# Patient Record
Sex: MALE | Race: BLACK OR AFRICAN AMERICAN | NOT HISPANIC OR LATINO | Employment: FULL TIME | URBAN - METROPOLITAN AREA
[De-identification: names, ages, dates, MRNs, and addresses within clinical notes are randomized per-mention and may not be internally consistent; named-entity substitution may affect disease eponyms.]

---

## 2017-01-12 ENCOUNTER — LAB VISIT (OUTPATIENT)
Dept: LAB | Facility: HOSPITAL | Age: 27
End: 2017-01-12
Attending: INTERNAL MEDICINE
Payer: MEDICAID

## 2017-01-12 DIAGNOSIS — Z87.74 S/P TOF (TETRALOGY OF FALLOT) REPAIR: Chronic | ICD-10-CM

## 2017-01-12 DIAGNOSIS — I50.20 NYHA CLASS 2 HEART FAILURE WITH REDUCED EJECTION FRACTION: ICD-10-CM

## 2017-01-12 LAB
ALBUMIN SERPL BCP-MCNC: 4.4 G/DL
ALBUMIN SERPL BCP-MCNC: 4.4 G/DL
ALP SERPL-CCNC: 80 U/L
ALP SERPL-CCNC: 80 U/L
ALT SERPL W/O P-5'-P-CCNC: 11 U/L
ALT SERPL W/O P-5'-P-CCNC: 11 U/L
ANION GAP SERPL CALC-SCNC: 10 MMOL/L
ANION GAP SERPL CALC-SCNC: 10 MMOL/L
AST SERPL-CCNC: 19 U/L
AST SERPL-CCNC: 19 U/L
BASOPHILS # BLD AUTO: 0.04 K/UL
BASOPHILS NFR BLD: 0.5 %
BILIRUB SERPL-MCNC: 0.7 MG/DL
BILIRUB SERPL-MCNC: 0.7 MG/DL
BNP SERPL-MCNC: 52 PG/ML
BNP SERPL-MCNC: 52 PG/ML
BUN SERPL-MCNC: 13 MG/DL
BUN SERPL-MCNC: 13 MG/DL
CALCIUM SERPL-MCNC: 10.1 MG/DL
CALCIUM SERPL-MCNC: 10.1 MG/DL
CHLORIDE SERPL-SCNC: 106 MMOL/L
CHLORIDE SERPL-SCNC: 106 MMOL/L
CO2 SERPL-SCNC: 26 MMOL/L
CO2 SERPL-SCNC: 26 MMOL/L
CREAT SERPL-MCNC: 1.2 MG/DL
CREAT SERPL-MCNC: 1.2 MG/DL
DIFFERENTIAL METHOD: ABNORMAL
EOSINOPHIL # BLD AUTO: 0.6 K/UL
EOSINOPHIL NFR BLD: 8.3 %
ERYTHROCYTE [DISTWIDTH] IN BLOOD BY AUTOMATED COUNT: 14.2 %
EST. GFR  (AFRICAN AMERICAN): >60 ML/MIN/1.73 M^2
EST. GFR  (AFRICAN AMERICAN): >60 ML/MIN/1.73 M^2
EST. GFR  (NON AFRICAN AMERICAN): >60 ML/MIN/1.73 M^2
EST. GFR  (NON AFRICAN AMERICAN): >60 ML/MIN/1.73 M^2
GLUCOSE SERPL-MCNC: 71 MG/DL
GLUCOSE SERPL-MCNC: 71 MG/DL
HCT VFR BLD AUTO: 47.7 %
HGB BLD-MCNC: 15.9 G/DL
LYMPHOCYTES # BLD AUTO: 1.6 K/UL
LYMPHOCYTES NFR BLD: 20.8 %
MCH RBC QN AUTO: 28.9 PG
MCHC RBC AUTO-ENTMCNC: 33.3 %
MCV RBC AUTO: 87 FL
MONOCYTES # BLD AUTO: 0.5 K/UL
MONOCYTES NFR BLD: 6.1 %
NEUTROPHILS # BLD AUTO: 5 K/UL
NEUTROPHILS NFR BLD: 64.2 %
PLATELET # BLD AUTO: 179 K/UL
PMV BLD AUTO: 11.3 FL
POTASSIUM SERPL-SCNC: 4 MMOL/L
POTASSIUM SERPL-SCNC: 4 MMOL/L
PROT SERPL-MCNC: 8 G/DL
PROT SERPL-MCNC: 8 G/DL
RBC # BLD AUTO: 5.5 M/UL
SODIUM SERPL-SCNC: 142 MMOL/L
SODIUM SERPL-SCNC: 142 MMOL/L
TSH SERPL DL<=0.005 MIU/L-ACNC: 0.9 UIU/ML
URATE SERPL-MCNC: 5.9 MG/DL
WBC # BLD AUTO: 7.75 K/UL

## 2017-01-12 PROCEDURE — 84550 ASSAY OF BLOOD/URIC ACID: CPT

## 2017-01-12 PROCEDURE — 85025 COMPLETE CBC W/AUTO DIFF WBC: CPT

## 2017-01-12 PROCEDURE — 83880 ASSAY OF NATRIURETIC PEPTIDE: CPT

## 2017-01-12 PROCEDURE — 84443 ASSAY THYROID STIM HORMONE: CPT

## 2017-01-12 PROCEDURE — 36415 COLL VENOUS BLD VENIPUNCTURE: CPT

## 2017-01-12 PROCEDURE — 80053 COMPREHEN METABOLIC PANEL: CPT

## 2017-06-22 ENCOUNTER — HOSPITAL ENCOUNTER (OUTPATIENT)
Dept: CARDIOLOGY | Facility: CLINIC | Age: 27
Discharge: HOME | End: 2017-06-22
Payer: MEDICAID

## 2017-06-22 ENCOUNTER — LAB VISIT (OUTPATIENT)
Dept: LAB | Facility: HOSPITAL | Age: 27
End: 2017-06-22
Payer: MEDICAID

## 2017-06-22 ENCOUNTER — OFFICE VISIT (OUTPATIENT)
Dept: CARDIOLOGY | Facility: CLINIC | Age: 27
End: 2017-06-22
Payer: MEDICAID

## 2017-06-22 VITALS
HEART RATE: 60 BPM | DIASTOLIC BLOOD PRESSURE: 125 MMHG | BODY MASS INDEX: 27.92 KG/M2 | OXYGEN SATURATION: 100 % | SYSTOLIC BLOOD PRESSURE: 194 MMHG | HEIGHT: 66 IN | WEIGHT: 173.75 LBS

## 2017-06-22 DIAGNOSIS — Z95.4 S/P TRANSCATHETER REPLACEMENT OF PULMONARY VALVE: ICD-10-CM

## 2017-06-22 DIAGNOSIS — I42.0 DILATED CARDIOMYOPATHY: ICD-10-CM

## 2017-06-22 DIAGNOSIS — Z98.890 S/P RIGHT VENTRICLE TO PULMONARY ARTERY (RV-PA) CONDUIT: ICD-10-CM

## 2017-06-22 DIAGNOSIS — Z87.74 S/P TOF (TETRALOGY OF FALLOT) REPAIR: Chronic | ICD-10-CM

## 2017-06-22 DIAGNOSIS — I35.1 NONRHEUMATIC AORTIC VALVE INSUFFICIENCY: ICD-10-CM

## 2017-06-22 DIAGNOSIS — I51.7 CARDIOMEGALY: ICD-10-CM

## 2017-06-22 DIAGNOSIS — Q21.3 TOF (TETRALOGY OF FALLOT): Primary | ICD-10-CM

## 2017-06-22 DIAGNOSIS — Q21.3 TOF (TETRALOGY OF FALLOT): ICD-10-CM

## 2017-06-22 DIAGNOSIS — I10 ESSENTIAL HYPERTENSION: ICD-10-CM

## 2017-06-22 LAB
ALBUMIN SERPL BCP-MCNC: 4.1 G/DL
ALP SERPL-CCNC: 109 U/L
ALT SERPL W/O P-5'-P-CCNC: 12 U/L
ANION GAP SERPL CALC-SCNC: 7 MMOL/L
AST SERPL-CCNC: 18 U/L
BASOPHILS # BLD AUTO: 0.04 K/UL
BASOPHILS NFR BLD: 0.4 %
BILIRUB SERPL-MCNC: 0.5 MG/DL
BNP SERPL-MCNC: 46 PG/ML
BUN SERPL-MCNC: 9 MG/DL
CALCIUM SERPL-MCNC: 10 MG/DL
CHLORIDE SERPL-SCNC: 105 MMOL/L
CO2 SERPL-SCNC: 29 MMOL/L
CREAT SERPL-MCNC: 1.1 MG/DL
DIFFERENTIAL METHOD: ABNORMAL
EOSINOPHIL # BLD AUTO: 0.8 K/UL
EOSINOPHIL NFR BLD: 8.8 %
ERYTHROCYTE [DISTWIDTH] IN BLOOD BY AUTOMATED COUNT: 14.9 %
EST. GFR  (AFRICAN AMERICAN): >60 ML/MIN/1.73 M^2
EST. GFR  (NON AFRICAN AMERICAN): >60 ML/MIN/1.73 M^2
GLUCOSE SERPL-MCNC: 72 MG/DL
HCT VFR BLD AUTO: 46 %
HGB BLD-MCNC: 15.2 G/DL
LYMPHOCYTES # BLD AUTO: 1.4 K/UL
LYMPHOCYTES NFR BLD: 15.3 %
MCH RBC QN AUTO: 29 PG
MCHC RBC AUTO-ENTMCNC: 33 %
MCV RBC AUTO: 88 FL
MONOCYTES # BLD AUTO: 0.6 K/UL
MONOCYTES NFR BLD: 6.6 %
NEUTROPHILS # BLD AUTO: 6.2 K/UL
NEUTROPHILS NFR BLD: 68.6 %
PLATELET # BLD AUTO: 198 K/UL
PMV BLD AUTO: 11.1 FL
POTASSIUM SERPL-SCNC: 4.4 MMOL/L
PROT SERPL-MCNC: 7.7 G/DL
RBC # BLD AUTO: 5.25 M/UL
SODIUM SERPL-SCNC: 141 MMOL/L
WBC # BLD AUTO: 9.09 K/UL

## 2017-06-22 PROCEDURE — 83880 ASSAY OF NATRIURETIC PEPTIDE: CPT | Mod: TXP

## 2017-06-22 PROCEDURE — 93010 ELECTROCARDIOGRAM REPORT: CPT | Mod: NTX,,, | Performed by: PEDIATRICS

## 2017-06-22 PROCEDURE — 36415 COLL VENOUS BLD VENIPUNCTURE: CPT | Mod: TXP

## 2017-06-22 PROCEDURE — 80053 COMPREHEN METABOLIC PANEL: CPT | Mod: TXP

## 2017-06-22 PROCEDURE — 93325 DOPPLER ECHO COLOR FLOW MAPG: CPT | Mod: PBBFAC,NTX | Performed by: INTERNAL MEDICINE

## 2017-06-22 PROCEDURE — 99215 OFFICE O/P EST HI 40 MIN: CPT | Mod: S$PBB,NTX,, | Performed by: PEDIATRICS

## 2017-06-22 PROCEDURE — 93005 ELECTROCARDIOGRAM TRACING: CPT | Mod: PBBFAC,NTX | Performed by: PEDIATRICS

## 2017-06-22 PROCEDURE — 99999 PR PBB SHADOW E&M-EST. PATIENT-LVL III: CPT | Mod: PBBFAC,TXP,, | Performed by: PEDIATRICS

## 2017-06-22 PROCEDURE — 93320 DOPPLER ECHO COMPLETE: CPT | Mod: 26,S$PBB,NTX, | Performed by: INTERNAL MEDICINE

## 2017-06-22 PROCEDURE — 85025 COMPLETE CBC W/AUTO DIFF WBC: CPT | Mod: TXP

## 2017-06-22 PROCEDURE — 93303 ECHO TRANSTHORACIC: CPT | Mod: 26,S$PBB,NTX, | Performed by: INTERNAL MEDICINE

## 2017-06-22 RX ORDER — AMOXICILLIN 500 MG/1
500 CAPSULE ORAL 3 TIMES DAILY
COMMUNITY
Start: 2017-06-14 | End: 2017-06-23

## 2017-06-22 NOTE — PROGRESS NOTES
"2017    Re:Thomas Horvath  :1990     Sissy Lacy MD  [address] [place] [address] [facility] Note    Thomas Horvath is a 26 y.o. male with the following diagnoses:  1. 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   3. Status post placement of epicardial left ventricular pacing wires.   4. Medical noncompliance, lack of insurance and marijuana abuse  5. 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 Mild pulmonary artery hypertension.   7. Trace aortic insufficiency and mild aortic root enlargement  8. Decreased LV function - possibly exacerbated by high afterload.  Unchanged compared to last echo.   9. Hypertension.   10. likely depression  11. inconsistent compliance       In 2015 he was admitted with worsening dyspnea on exertion (NYHA heart III), chest pain, severe fatigue.  An echocardiogram revealed severe obstruction in his right ventricle to pulmonary artery conduit.  Left ventricular function was also noted.  A new pulmonary valve was placed in the Cath Lab as noted above.  That procedure went extremely well.  He continued to have significant systemic hypertension and headaches after the procedure that required several days to get under control.    Interval history:  I last saw him nearly 2 years ago.  He is "trying to get my life together".  He started back on some of his medications a few weeks ago.  I had to call his pharmacy to confirm that he is now taking the following:  Carvedilol 12.5mg bid  lisinopril 10mg daily  aldactone 12.5mg daily    He has lots of complaints:  1. Continued unchanged dyspnea on exertion.  He can walk long distances without any shortness of breath, but very brief jogging causes extreme shortness of breath.  2. Frequent chest " "pain.  It is predominantly left sided and is worsened with stress.    3. Anxiety and depression.  No suicidal/homicidal thoughts, but lots of worry.  Self medicates with marijuana ("2 blunts/day") and occasional xanax.  Anxiety has gotten in the way of his care.  He admits that some noncompliance with follow up is due to worry.  4. Concerns about future job opportunities.  5. Occasional constipation.  6. Lots of abdominal pain, lower back pain, flank pain.    The review of systems is as noted above. It is otherwise negative for other symptoms related to the general, neurological, psychiatric, endocrine, gastrointestinal, genitourinary, respiratory, dermatologic, musculoskeletal, hematologic, and immunologic systems.    Past Medical History:   Diagnosis Date    Aortic insufficiency     trace    Homograft cardiac valve stenosis     mild    Hypertension     Left ventricular dysfunction     Pulmonary artery hypertension     RV hypertension    Right ventricular dilation     S/P TOF (tetralogy of Fallot) repair 12/18/2014    TOF (tetralogy of Fallot)      Past Surgical History:   Procedure Laterality Date    balloon dilation of pulmonary atrery homograft  2008    CARDIAC VALVE SURGERY      7/23/15 [facility]    epicardial pacing wires      LV    pulmonary valve replacement  July 7, 2006    TETRALOGY OF FALLOT REPAIR  1990     Family History   Problem Relation Age of Onset    Hypertension Mother     No Known Problems Father     No Known Problems Sister     No Known Problems Brother     No Known Problems Maternal Grandmother     No Known Problems Maternal Grandfather     No Known Problems Paternal Grandmother     No Known Problems Paternal Grandfather     No Known Problems Maternal Aunt     No Known Problems Maternal Uncle     No Known Problems Paternal Aunt     No Known Problems Paternal Uncle     Anemia Neg Hx     Arrhythmia Neg Hx     Asthma Neg Hx     Clotting disorder Neg Hx     Fainting Neg Hx " "    Heart attack Neg Hx     Heart disease Neg Hx     Heart failure Neg Hx     Hyperlipidemia Neg Hx     Stroke Neg Hx     Atrial Septal Defect Neg Hx      Social History     Social History    Marital status: Single     Spouse name: N/A    Number of children: N/A    Years of education: 10     Social History Main Topics    Smoking status: Former Smoker     Start date: 6/9/2006     Quit date: 6/9/2015    Smokeless tobacco: Never Used      Comment: marijuana    Alcohol use No    Drug use:      Frequency: 7.0 times per week     Types: Marijuana    Sexual activity: Yes     Partners: Female     Other Topics Concern    None     Social History Narrative    Lives at home with mother at this time.  He is uninsured and not working.     Current Outpatient Prescriptions on File Prior to Visit   Medication Sig Dispense Refill    aspirin 81 MG Chew Take 1 tablet (81 mg total) by mouth once daily.  0    carvedilol (COREG) 25 MG tablet Take 1 tablet (25 mg total) by mouth 2 (two) times daily. 180 tablet 3    lisinopril (PRINIVIL,ZESTRIL) 20 MG tablet Take 1 tablet (20 mg total) by mouth every evening. 90 tablet 5    alprazolam (XANAX) 1 MG tablet TK 1 T PO  TID PRN FOR ANXIETY  0    digoxin (LANOXIN) 125 mcg tablet Take 1 tablet (125 mcg total) by mouth once daily. 90 tablet 3    furosemide (LASIX) 20 MG tablet Take 1 tablet (20 mg total) by mouth daily as needed (weight gain > 3lbs in 24hrs). 1 up to 4 a day as needed only for swelling, wt gain 90 tablet 11    hydrOXYzine pamoate (VISTARIL) 25 MG Cap Take 1 capsule (25 mg total) by mouth 4 (four) times daily. 15 capsule 0    spironolactone (ALDACTONE) 25 MG tablet Take 1 tablet (25 mg total) by mouth once daily. 90 tablet 4     No current facility-administered medications on file prior to visit.      No Known Allergies    BP (!) 194/125 (BP Location: Left arm, Patient Position: Sitting, BP Method: Automatic)   Pulse 60   Ht 5' 6" (1.676 m)   Wt 78.8 kg (173 " lb 11.6 oz)   SpO2 100%   BMI 28.04 kg/m²    Recheck BP in right arm - 186/116.  In general, he is a healthy appearing, nondysmorphic-appearing, black male in no apparent distress. The eyes, nares, and oropharynx are clear.  The neck is supple without thyroid enlargement.  There is very mild jugular venous distention.  The lungs are clear to auscultation bilaterally. There is no wheezing.  There are no rales.  A well-healed median sternotomy incision is noted.  The first heart sound is normal.  A grade 1/6 systolic ejection murmur is best auscultated at the upper left sternal border.  There are no diastolic murmurs or gallops.  The second heart sound is fixed and split.  The abdominal exam is benign without hepatosplenomegaly, tenderness, or distention.  Pulses are normal in all extremities except the right hand where it is mildly decreased.  There is no clubbing, cyanosis, or edema.  No rashes are noted.  He does have a tattoo on the right arm.    I personally reviewed the following tests performed today and my interpretation follows:  Echo: (my interpretation, full report pending)  Mild TR with peak velocity as high as 3 m/s suggesting rv pressure around 45mmHg  Mild to moderately dilated right ventricle with mild to moderate dysfunction  Images consistent with Anne implant in pulmonary homograft with trivial insufficiency and no significant stenosis  Trivial mitral valve insufficiency.  Normal left ventricle structure and size.  Moderately reduced LV function - I estimate an EF around 35-40%, but report is pending.  Trivial aortic valve insufficiency.  No pericardial effusion.    CMP  Sodium   Date Value Ref Range Status   06/22/2017 141 136 - 145 mmol/L Final     Potassium   Date Value Ref Range Status   06/22/2017 4.4 3.5 - 5.1 mmol/L Final     Chloride   Date Value Ref Range Status   06/22/2017 105 95 - 110 mmol/L Final     CO2   Date Value Ref Range Status   06/22/2017 29 23 - 29 mmol/L Final     Glucose    Date Value Ref Range Status   06/22/2017 72 70 - 110 mg/dL Final     BUN, Bld   Date Value Ref Range Status   06/22/2017 9 6 - 20 mg/dL Final     Creatinine   Date Value Ref Range Status   06/22/2017 1.1 0.5 - 1.4 mg/dL Final     Calcium   Date Value Ref Range Status   06/22/2017 10.0 8.7 - 10.5 mg/dL Final     Total Protein   Date Value Ref Range Status   06/22/2017 7.7 6.0 - 8.4 g/dL Final     Albumin   Date Value Ref Range Status   06/22/2017 4.1 3.5 - 5.2 g/dL Final     Total Bilirubin   Date Value Ref Range Status   06/22/2017 0.5 0.1 - 1.0 mg/dL Final     Comment:     For infants and newborns, interpretation of results should be based  on gestational age, weight and in agreement with clinical  observations.  Premature Infant recommended reference ranges:  Up to 24 hours.............<8.0 mg/dL  Up to 48 hours............<12.0 mg/dL  3-5 days..................<15.0 mg/dL  6-29 days.................<15.0 mg/dL       Alkaline Phosphatase   Date Value Ref Range Status   06/22/2017 109 55 - 135 U/L Final     AST   Date Value Ref Range Status   06/22/2017 18 10 - 40 U/L Final     ALT   Date Value Ref Range Status   06/22/2017 12 10 - 44 U/L Final     Anion Gap   Date Value Ref Range Status   06/22/2017 7 (L) 8 - 16 mmol/L Final     eGFR if    Date Value Ref Range Status   06/22/2017 >60.0 >60 mL/min/1.73 m^2 Final     eGFR if non    Date Value Ref Range Status   06/22/2017 >60.0 >60 mL/min/1.73 m^2 Final     Comment:     Calculation used to obtain the estimated glomerular filtration  rate (eGFR) is the CKD-EPI equation. Since race is unknown   in our information system, the eGFR values for   -American and Non--American patients are given   for each creatinine result.       Lab Results   Component Value Date    WBC 9.09 06/22/2017    HGB 15.2 06/22/2017    HCT 46.0 06/22/2017    MCV 88 06/22/2017     06/22/2017     Results for DON PALOMO (MRN [id number])  as of 6/23/2017 13:31   Ref. Range 6/22/2017 14:53   BNP Latest Ref Range: 0 - 99 pg/mL 46     Cath 7/23/15:  IMPRESSION:  1. Repaired tetralogy of Fallot with severe homograft stenosis, peak gradient 40 mmHg.  2. Right and left ventricular dysfunction and low cardiac output, normal pulmonary vascular resistance calculations.  3. Right aortic arch.  4. Right coronary artery from left sinus of Valsalva.  5. RV to PA conduit dilation with Reina 16, 18, 20 and 22 mm balloons.  6. RV to pulmonary artery conduit stent with Palmaz [id number] on 22 x 4 BIB (x3), postdilated with [place] 22 mm balloon (16 atmospheres).  7. Anne valve implantation on 22-Tristanian Ensemble, residual gradient 9 mmHg, trivial insufficiency.    Discussion:  The pulmonary valve placed percutaneously looks great.  His right ventricular outflow tract obstruction is completely resolved, and there is no significant insufficiency.  He has significant right ventricular dysfunction.  This may not recover.  My bigger concern is his left ventricle.  The function is significantly decreased.  I think this is at least in part related to his significant hypertension which is worsened by his medical noncompliance.  We have seen much better ejection fraction is when his blood pressure was better controlled, including an EF of about 45% on the day after his most recent cardiac catheterization while he was in the hospital.  I had a long talk with the patient.  I think depression is a significant issue and likely contributes to a lot of his symptoms and his noncompliance.  My plan is as follows:  1.  He has to take a baby aspirin once a day.  Generic baby aspirin from any pharmacy is adequate.  This is to help prevent thrombus formation on his new pulmonary valve.  2.  Endocarditis prophylaxis before procedures is indicated.  3.  We need to get his blood pressure under better control to help his ventricular function.  Increase back to original doses of medications - I called  these into his pharmacy - lisinopril 20mg daily, carvedilol 25mg bid.    4.  He has an appointment on 6/28 with his PCP.  I will call her:   - recheck BP.  If systolic is greater than 140, would add HCTZ daily   - evaluation of anxiety and depression, consider medications  5.  Follow up with me with repeat echo on 8/9/17.  Hopefully we will see better LV function with better BP control.  6.  He should go to the emergency room immediately with worsening shortness of breath, significant chest pain, syncope, or palpitations.  7.  Recommended he back off on the marijuana use.    Further recommendations regarding activity restrictions will be based on LV recovery.  If his LV function remains significantly decreased, he will likely need disability long term.  He may need an ICD in the future.  He has resisted a transplant evaluation.    Ino Corona MD  Pediatric Cardiology  Adult Congenital Heart Disease  Pediatric Heart Failure and Transplantation  [facility] [address] [place]  [address]  [phone]

## 2017-06-23 RX ORDER — SPIRONOLACTONE 25 MG/1
12.5 TABLET ORAL DAILY
Qty: 30 TABLET | Refills: 6 | Status: ON HOLD
Start: 2017-06-23 | End: 2019-06-28

## 2017-06-23 RX ORDER — CARVEDILOL 25 MG/1
25 TABLET ORAL 2 TIMES DAILY
Qty: 60 TABLET | Refills: 6 | Status: ON HOLD
Start: 2017-06-23 | End: 2019-06-28

## 2017-06-23 RX ORDER — LISINOPRIL 20 MG/1
20 TABLET ORAL NIGHTLY
Qty: 30 TABLET | Refills: 6 | Status: ON HOLD
Start: 2017-06-23 | End: 2019-06-28

## 2017-06-24 LAB
DIASTOLIC DYSFUNCTION: YES
ESTIMATED PA SYSTOLIC PRESSURE: 38.94
RETIRED EF AND QEF - SEE NOTES: 38 (ref 55–65)
TRICUSPID VALVE REGURGITATION: ABNORMAL

## 2018-08-07 ENCOUNTER — DOCUMENTATION ONLY (OUTPATIENT)
Dept: TRANSPLANT | Facility: CLINIC | Age: 28
End: 2018-08-07

## 2018-08-07 NOTE — PROGRESS NOTES
Pt's Preht file being closed d/t lack of f/u on pt's part. All appts scheduled with the clinic were either cancelled or no show. Last note on 11/14/2016 informed Transplant Coordinator that he will call back when he is able to return.

## 2019-02-25 DIAGNOSIS — Z87.74 S/P TOF (TETRALOGY OF FALLOT) REPAIR: Primary | Chronic | ICD-10-CM

## 2019-02-27 ENCOUNTER — TELEPHONE (OUTPATIENT)
Dept: PEDIATRIC CARDIOLOGY | Facility: CLINIC | Age: 29
End: 2019-02-27

## 2019-02-27 NOTE — TELEPHONE ENCOUNTER
Made appointment for Thomas on March 14th, start time 1:30. Will mail out appointment slip today. Thomas verbalized understanding all information provided.   ----- Message from Mariana Orellana sent at 2/27/2019  2:33 PM CST -----  Contact: pt  Pt would like to be called back regarding his appt    Pt can be reached at [phone]

## 2019-06-27 ENCOUNTER — HOSPITAL ENCOUNTER (OUTPATIENT)
Facility: HOSPITAL | Age: 29
Discharge: HOME | End: 2019-06-28
Attending: EMERGENCY MEDICINE | Admitting: INTERNAL MEDICINE
Payer: MEDICAID

## 2019-06-27 DIAGNOSIS — E83.41 HYPERMAGNESEMIA: ICD-10-CM

## 2019-06-27 DIAGNOSIS — Z87.74 S/P TOF (TETRALOGY OF FALLOT) REPAIR: Chronic | ICD-10-CM

## 2019-06-27 DIAGNOSIS — I50.42 CHRONIC COMBINED SYSTOLIC AND DIASTOLIC HEART FAILURE: ICD-10-CM

## 2019-06-27 DIAGNOSIS — E87.5 HYPERKALEMIA: ICD-10-CM

## 2019-06-27 DIAGNOSIS — N17.9 AKI (ACUTE KIDNEY INJURY): Primary | ICD-10-CM

## 2019-06-27 DIAGNOSIS — R07.9 CHEST PAIN: ICD-10-CM

## 2019-06-27 DIAGNOSIS — E83.52 HYPERCALCEMIA: ICD-10-CM

## 2019-06-27 DIAGNOSIS — T67.5XXA HEAT EXHAUSTION, INITIAL ENCOUNTER: ICD-10-CM

## 2019-06-27 DIAGNOSIS — Q21.3 TOF (TETRALOGY OF FALLOT): ICD-10-CM

## 2019-06-27 DIAGNOSIS — E86.9 VOLUME DEPLETION: ICD-10-CM

## 2019-06-27 LAB
ALBUMIN SERPL BCP-MCNC: 5.4 G/DL (ref 3.5–5.2)
ALP SERPL-CCNC: 101 U/L (ref 55–135)
ALT SERPL W/O P-5'-P-CCNC: 27 U/L (ref 10–44)
ANION GAP SERPL CALC-SCNC: 12 MMOL/L (ref 8–16)
ANION GAP SERPL CALC-SCNC: 14 MMOL/L (ref 8–16)
ANION GAP SERPL CALC-SCNC: 16 MMOL/L (ref 8–16)
AST SERPL-CCNC: 38 U/L (ref 10–40)
BASOPHILS # BLD AUTO: 0.02 K/UL (ref 0–0.2)
BASOPHILS NFR BLD: 0.2 % (ref 0–1.9)
BILIRUB SERPL-MCNC: 0.9 MG/DL (ref 0.1–1)
BNP SERPL-MCNC: 21 PG/ML (ref 0–99)
BUN SERPL-MCNC: 32 MG/DL (ref 6–20)
BUN SERPL-MCNC: 34 MG/DL (ref 6–20)
BUN SERPL-MCNC: 35 MG/DL (ref 6–20)
CALCIUM SERPL-MCNC: 10.2 MG/DL (ref 8.7–10.5)
CALCIUM SERPL-MCNC: 10.2 MG/DL (ref 8.7–10.5)
CALCIUM SERPL-MCNC: 12.2 MG/DL (ref 8.7–10.5)
CHLORIDE SERPL-SCNC: 102 MMOL/L (ref 95–110)
CHLORIDE SERPL-SCNC: 104 MMOL/L (ref 95–110)
CHLORIDE SERPL-SCNC: 96 MMOL/L (ref 95–110)
CK SERPL-CCNC: 686 U/L (ref 20–200)
CO2 SERPL-SCNC: 19 MMOL/L (ref 23–29)
CO2 SERPL-SCNC: 22 MMOL/L (ref 23–29)
CO2 SERPL-SCNC: 26 MMOL/L (ref 23–29)
CREAT SERPL-MCNC: 1.4 MG/DL (ref 0.5–1.4)
CREAT SERPL-MCNC: 2 MG/DL (ref 0.5–1.4)
CREAT SERPL-MCNC: 3.2 MG/DL (ref 0.5–1.4)
DIFFERENTIAL METHOD: ABNORMAL
DIGOXIN SERPL-MCNC: 0.4 NG/ML (ref 0.8–2)
EOSINOPHIL # BLD AUTO: 0.2 K/UL (ref 0–0.5)
EOSINOPHIL NFR BLD: 1.7 % (ref 0–8)
ERYTHROCYTE [DISTWIDTH] IN BLOOD BY AUTOMATED COUNT: 13.4 % (ref 11.5–14.5)
EST. GFR  (AFRICAN AMERICAN): 29 ML/MIN/1.73 M^2
EST. GFR  (AFRICAN AMERICAN): 51 ML/MIN/1.73 M^2
EST. GFR  (AFRICAN AMERICAN): >60 ML/MIN/1.73 M^2
EST. GFR  (NON AFRICAN AMERICAN): 25 ML/MIN/1.73 M^2
EST. GFR  (NON AFRICAN AMERICAN): 44 ML/MIN/1.73 M^2
EST. GFR  (NON AFRICAN AMERICAN): >60 ML/MIN/1.73 M^2
GLUCOSE SERPL-MCNC: 68 MG/DL (ref 70–110)
GLUCOSE SERPL-MCNC: 95 MG/DL (ref 70–110)
GLUCOSE SERPL-MCNC: 99 MG/DL (ref 70–110)
HCT VFR BLD AUTO: 56.6 % (ref 40–54)
HGB BLD-MCNC: 18.5 G/DL (ref 14–18)
LACTATE SERPL-SCNC: 1.4 MMOL/L (ref 0.5–2.2)
LYMPHOCYTES # BLD AUTO: 1.2 K/UL (ref 1–4.8)
LYMPHOCYTES NFR BLD: 9.8 % (ref 18–48)
MAGNESIUM SERPL-MCNC: 2.9 MG/DL (ref 1.6–2.6)
MCH RBC QN AUTO: 28.1 PG (ref 27–31)
MCHC RBC AUTO-ENTMCNC: 32.7 G/DL (ref 32–36)
MCV RBC AUTO: 86 FL (ref 82–98)
MONOCYTES # BLD AUTO: 1.1 K/UL (ref 0.3–1)
MONOCYTES NFR BLD: 8.9 % (ref 4–15)
NEUTROPHILS # BLD AUTO: 10 K/UL (ref 1.8–7.7)
NEUTROPHILS NFR BLD: 79.4 % (ref 38–73)
PLATELET # BLD AUTO: 178 K/UL (ref 150–350)
PMV BLD AUTO: 10.6 FL (ref 9.2–12.9)
POCT GLUCOSE: 100 MG/DL (ref 70–110)
POCT GLUCOSE: 114 MG/DL (ref 70–110)
POCT GLUCOSE: 163 MG/DL (ref 70–110)
POTASSIUM SERPL-SCNC: 5 MMOL/L (ref 3.5–5.1)
POTASSIUM SERPL-SCNC: 5.1 MMOL/L (ref 3.5–5.1)
POTASSIUM SERPL-SCNC: 7.1 MMOL/L (ref 3.5–5.1)
PROT SERPL-MCNC: 10 G/DL (ref 6–8.4)
RBC # BLD AUTO: 6.59 M/UL (ref 4.6–6.2)
SODIUM SERPL-SCNC: 136 MMOL/L (ref 136–145)
SODIUM SERPL-SCNC: 136 MMOL/L (ref 136–145)
SODIUM SERPL-SCNC: 139 MMOL/L (ref 136–145)
TROPONIN I SERPL DL<=0.01 NG/ML-MCNC: <0.006 NG/ML (ref 0–0.03)
TROPONIN I SERPL DL<=0.01 NG/ML-MCNC: <0.006 NG/ML (ref 0–0.03)
WBC # BLD AUTO: 12.59 K/UL (ref 3.9–12.7)

## 2019-06-27 PROCEDURE — 25000003 PHARM REV CODE 250: Performed by: EMERGENCY MEDICINE

## 2019-06-27 PROCEDURE — 99291 CRITICAL CARE FIRST HOUR: CPT | Mod: 25

## 2019-06-27 PROCEDURE — 83605 ASSAY OF LACTIC ACID: CPT

## 2019-06-27 PROCEDURE — 94640 AIRWAY INHALATION TREATMENT: CPT

## 2019-06-27 PROCEDURE — 25000242 PHARM REV CODE 250 ALT 637 W/ HCPCS: Performed by: EMERGENCY MEDICINE

## 2019-06-27 PROCEDURE — 96374 THER/PROPH/DIAG INJ IV PUSH: CPT

## 2019-06-27 PROCEDURE — 83735 ASSAY OF MAGNESIUM: CPT

## 2019-06-27 PROCEDURE — 25000003 PHARM REV CODE 250: Performed by: STUDENT IN AN ORGANIZED HEALTH CARE EDUCATION/TRAINING PROGRAM

## 2019-06-27 PROCEDURE — 36415 COLL VENOUS BLD VENIPUNCTURE: CPT

## 2019-06-27 PROCEDURE — G0378 HOSPITAL OBSERVATION PER HR: HCPCS

## 2019-06-27 PROCEDURE — 94644 CONT INHLJ TX 1ST HOUR: CPT

## 2019-06-27 PROCEDURE — 82962 GLUCOSE BLOOD TEST: CPT

## 2019-06-27 PROCEDURE — 82550 ASSAY OF CK (CPK): CPT

## 2019-06-27 PROCEDURE — 80048 BASIC METABOLIC PNL TOTAL CA: CPT

## 2019-06-27 PROCEDURE — 84484 ASSAY OF TROPONIN QUANT: CPT | Mod: 91

## 2019-06-27 PROCEDURE — 80053 COMPREHEN METABOLIC PANEL: CPT

## 2019-06-27 PROCEDURE — 63600175 PHARM REV CODE 636 W HCPCS: Performed by: EMERGENCY MEDICINE

## 2019-06-27 PROCEDURE — 83880 ASSAY OF NATRIURETIC PEPTIDE: CPT

## 2019-06-27 PROCEDURE — 80162 ASSAY OF DIGOXIN TOTAL: CPT

## 2019-06-27 PROCEDURE — 93005 ELECTROCARDIOGRAM TRACING: CPT

## 2019-06-27 PROCEDURE — 85025 COMPLETE CBC W/AUTO DIFF WBC: CPT

## 2019-06-27 PROCEDURE — 96361 HYDRATE IV INFUSION ADD-ON: CPT

## 2019-06-27 PROCEDURE — 84484 ASSAY OF TROPONIN QUANT: CPT

## 2019-06-27 RX ORDER — NAPROXEN SODIUM 220 MG/1
81 TABLET, FILM COATED ORAL DAILY
Status: DISCONTINUED | OUTPATIENT
Start: 2019-06-28 | End: 2019-06-28 | Stop reason: HOSPADM

## 2019-06-27 RX ORDER — SODIUM CHLORIDE 9 MG/ML
1000 INJECTION, SOLUTION INTRAVENOUS
Status: COMPLETED | OUTPATIENT
Start: 2019-06-27 | End: 2019-06-27

## 2019-06-27 RX ORDER — DEXTROSE 50 % IN WATER (D50W) INTRAVENOUS SYRINGE
25
Status: COMPLETED | OUTPATIENT
Start: 2019-06-27 | End: 2019-06-27

## 2019-06-27 RX ORDER — HEPARIN SODIUM 5000 [USP'U]/ML
5000 INJECTION, SOLUTION INTRAVENOUS; SUBCUTANEOUS EVERY 8 HOURS
Status: DISCONTINUED | OUTPATIENT
Start: 2019-06-27 | End: 2019-06-28 | Stop reason: HOSPADM

## 2019-06-27 RX ORDER — ALBUTEROL SULFATE 2.5 MG/.5ML
10 SOLUTION RESPIRATORY (INHALATION)
Status: COMPLETED | OUTPATIENT
Start: 2019-06-27 | End: 2019-06-27

## 2019-06-27 RX ORDER — CARVEDILOL 25 MG/1
25 TABLET ORAL 2 TIMES DAILY
Status: DISCONTINUED | OUTPATIENT
Start: 2019-06-27 | End: 2019-06-28 | Stop reason: HOSPADM

## 2019-06-27 RX ORDER — SODIUM CHLORIDE 0.9 % (FLUSH) 0.9 %
10 SYRINGE (ML) INJECTION
Status: DISCONTINUED | OUTPATIENT
Start: 2019-06-27 | End: 2019-06-28 | Stop reason: HOSPADM

## 2019-06-27 RX ADMIN — DEXTROSE MONOHYDRATE 25 G: 500 INJECTION PARENTERAL at 12:06

## 2019-06-27 RX ADMIN — SODIUM CHLORIDE, SODIUM LACTATE, POTASSIUM CHLORIDE, AND CALCIUM CHLORIDE 1000 ML: .6; .31; .03; .02 INJECTION, SOLUTION INTRAVENOUS at 06:06

## 2019-06-27 RX ADMIN — SODIUM CHLORIDE 1000 ML: 0.9 INJECTION, SOLUTION INTRAVENOUS at 12:06

## 2019-06-27 RX ADMIN — CARVEDILOL 25 MG: 25 TABLET, FILM COATED ORAL at 09:06

## 2019-06-27 RX ADMIN — INSULIN HUMAN 10 UNITS: 100 INJECTION, SOLUTION PARENTERAL at 12:06

## 2019-06-27 RX ADMIN — ALBUTEROL SULFATE 10 MG: 2.5 SOLUTION RESPIRATORY (INHALATION) at 12:06

## 2019-06-27 RX ADMIN — SODIUM CHLORIDE 1000 ML: 0.9 INJECTION, SOLUTION INTRAVENOUS at 11:06

## 2019-06-27 NOTE — H&P
\[facility]\"" Internal Medicine History and Physical - Resident Note    Admitting Team: \[facility]\"" Internal Medicine Team A  Attending Physician: Dr. Kat  Resident: Rob  Interns: Sangeetha    Date of Admit: 6/27/2019    Chief Complaint     Chest Pain (with weakness and cramping, +n/v  pain to chest and sheree arms, Pt had valve replacement surgery.)   for 1 day.    Subjective:      History of Present Illness:  Thomas Horvath is a 28 y.o. male who  has a past medical history of Aortic insufficiency, Homograft cardiac valve stenosis, Hypertension, Left ventricular dysfunction, Pulmonary artery hypertension, Right ventricular dilation, S/P TOF (tetralogy of Fallot) repair (12/18/2014), and TOF (tetralogy of Fallot).. The patient presented to [facility] on 6/27/2019 with a primary complaint of Chest Pain (with weakness and cramping, +n/v  pain to chest and sheree arms, Pt had valve replacement surgery.)    Patient reports that he was feeling fatigued starting last night before he went to bed. Woke up in the middle of the night with some cramping to his R leg. 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 This morning was at work, had just finished delivering water/ice (heavy lifting) when he began to feel weak with muscle cramps, beginning in his legs and arms, also to his abdomen, chest and back. Associated diaphoresis and felt very dry, had emesis x 2 NBNB. He sat down with some colleagues, tried to have some gatorade and water but cramping persisted. Colleague contacted EMS.     Patient received 2L IVF in ED. Found to be hyperkalemic with K 7, given insulin/D50. Labs also very hemoconcentrated, VINOD present. On my interview, feeling better with no associated cramping. Denies chest pain, nausea, diarrhea, fever/chills, sensory changes or weakness, vision changes.     Previously followed with cardiology for genetic heart defect and surgeries; was supposed to see them  in January but started working so was unable. Last appointment per chart review 6/22/2017.  Reports compliance with home medicines: lisinopril, spironolactone, coreg, digoxin, ASA; reports he took medicines this morning.     Past Medical History:  Past Medical History:   Diagnosis Date    Aortic insufficiency     trace    Homograft cardiac valve stenosis     mild    Hypertension     Left ventricular dysfunction     Pulmonary artery hypertension     RV hypertension    Right ventricular dilation     S/P TOF (tetralogy of Fallot) repair 12/18/2014    TOF (tetralogy of Fallot)        Past Surgical History:  Past Surgical History:   Procedure Laterality Date    balloon dilation of pulmonary atrery homograft  2008    CARDIAC VALVE SURGERY      7/23/15 @ [facility] [place]    epicardial pacing wires      LV    pulmonary valve replacement  July 7, 2006    REPLACEMENT-VALVE-PULMONARY N/A 7/23/2015    Performed by J[facility]z Jr., [facility] at [facility] CATH LAB    C WITH [facility]RO [facility] CONGEITAL CARD ABN N/A 7/23/2015    Performed by J[facility] Jr., MD at [facility] CATH LAB    TETRALOGY OF FALLOT REPAIR  1990       Allergies:  Review of patient's allergies indicates:  No Known Allergies    Home Medications:  Prior to Admission medications    Medication Sig Start Date End Date Taking? Authorizing Provider   aspirin 81 MG Chew Take 1 tablet (81 mg total) by mouth once daily. 10/14/16 10/14/17  Kelvin Harmon MD   carvedilol (COREG) 25 MG tablet Take 1 tablet (25 mg total) by mouth 2 (two) times daily. 6/23/17 6/23/18  Ino Corona MD   digoxin (LANOXIN) 125 mcg tablet Take 125 mcg by mouth once daily.    Historical Provider, MD   fluticasone (FLONASE) 50 mcg/actuation nasal spray 1 spray (50 mcg total) by Each Nare route 2 (two) times daily as needed for Rhinitis. 11/4/18   Jamal Rodriguez NP   lisinopril (PRINIVIL,ZESTRIL) 20 MG tablet Take 1 tablet (20 mg total) by mouth every evening. 6/23/17 6/23/18  Ino Corona MD  "  loratadine (CLARITIN) 10 mg tablet Take 1 tablet (10 mg total) by mouth once daily. 18  Jamal Rodriguez NP   spironolactone (ALDACTONE) 25 MG tablet Take 0.5 tablets (12.5 mg total) by mouth once daily. 17  Ino Corona MD       Family History:  Family History   Problem Relation Age of Onset    Hypertension Mother     No Known Problems Father     No Known Problems Sister     No Known Problems Brother     No Known Problems Maternal Grandmother     No Known Problems Maternal Grandfather     No Known Problems Paternal Grandmother     No Known Problems Paternal Grandfather     No Known Problems Maternal Aunt     No Known Problems Maternal Uncle     No Known Problems Paternal Aunt     No Known Problems Paternal Uncle     Anemia Neg Hx     Arrhythmia Neg Hx     Asthma Neg Hx     Clotting disorder Neg Hx     Fainting Neg Hx     Heart attack Neg Hx     Heart disease Neg Hx     Heart failure Neg Hx     Hyperlipidemia Neg Hx     Stroke Neg Hx     Atrial Septal Defect Neg Hx        Social History:  Social History     Tobacco Use    Smoking status: Former Smoker     Start date: 2006     Last attempt to quit: 2015     Years since quittin.0    Smokeless tobacco: Never Used    Tobacco comment: marijuana   Substance Use Topics    Alcohol use: No     Alcohol/week: 0.0 oz    Drug use: Yes     Frequency: 7.0 times per week     Types: Marijuana       Review of Systems:  Pertinent positives and negatives listed in HPI. All other systems are reviewed and are negative.    Health Maintaince :   Primary Care Physician: Dr. Lacy  Immunizations:   TDap unknown.  Influenza is up to date.  Pneumovax is up to date.  Cancer Screening:  None indicated for age.      Objective:   Last 24 Hour Vital Signs:  BP  Min: 86/68  Max: 135/71  Pulse  Av.6  Min: 68  Max: 76  Resp  Av  Min: 18  Max: 29  SpO2  Av.2 %  Min: 99 %  Max: 100 %  Height  Av' 6" (167.6 cm)  " "Min: 5' 6" (167.6 cm)  Max: 5' 6" (167.6 cm)  Weight  Av.5 kg (195 lb)  Min: 88.5 kg (195 lb)  Max: 88.5 kg (195 lb)  Body mass index is 31.47 kg/m².  No intake/output data recorded.    Physical Examination:  General: Alert and awake in no apparent distress, resting comfortably in bed  Head:  Normocephalic and atraumatic  Eyes:  PERRL; EOMi with anicteric sclera and clear conjunctivae  Mouth:  Oropharynx clear and without exudate; moist mucous membranes  Cardio:  Regular rate and rhythm, fixed split mechanical S2 best heard over LUSB, diastolic murmur appreciated   Resp:  CTAB; respirations unlabored; no wheezes, crackles or rhonchi  Abdom: Soft, NTND with normoactive bowel sounds  Extrem: Warm and well-perfused with no clubbing, cyanosis or edema  Skin:  No rashes, lesions, or color changes  Pulses: 2+ and symmetric distally  Neuro:  AAOx3; cooperative and pleasant with no focal deficits    Laboratory:  Most Recent Data:  CBC:   Lab Results   Component Value Date    WBC 12.59 2019    HGB 18.5 (H) 2019    HCT 56.6 (H) 2019     2019    MCV 86 2019    RDW 13.4 2019     BMP:   Lab Results   Component Value Date     2019    K 7.1 (HH) 2019    CL 96 2019    CO2 26 2019    BUN 35 (H) 2019    CREATININE 3.2 (H) 2019    GLU 99 2019    CALCIUM 12.2 (HH) 2019    MG 2.9 (H) 2019    PHOS 3.7 2015     Estimated Creatinine Clearance: 35.5 mL/min (A) (based on SCr of 3.2 mg/dL (H)).  LFTs:   Lab Results   Component Value Date    PROT 10.0 (H) 2019    ALBUMIN 5.4 (H) 2019    BILITOT 0.9 2019    AST 38 2019    ALKPHOS 101 2019    ALT 27 2019     Coags:   Lab Results   Component Value Date    INR 1.2 2015     FLP: No results found for: CHOL, HDL, LDLCALC, TRIG, CHOLHDL  DM:   Lab Results   Component Value Date    CREATININE 3.2 (H) 2019     Thyroid:   Lab Results   Component " Value Date    TSH 0.903 01/12/2017     Anemia: No results found for: IRON, TIBC, FERRITIN, UOWVILHP83, FOLATE  Cardiac:   Lab Results   Component Value Date    TROPONINI <0.006 06/27/2019    BNP 46 06/22/2017     Urinalysis:   Lab Results   Component Value Date    LABURIN NO GROWTH AFTER 48 HOURS. 06/09/2006    COLORU Nancy 04/28/2015    SPECGRAV 1.025 04/28/2015    NITRITE Negative 04/28/2015    KETONESU 3+ (A) 04/28/2015    UROBILINOGEN 4.0-6.0 (A) 04/28/2015       Trended Cardiac Data:  Recent Labs   Lab 06/27/19  1121   TROPONINI <0.006       Microbiology Data:  N/A    Other Laboratory Data:  N/A    Other Results:  EKG (my interpretation): sinus rhythm, tachycardia, bifasicular block, some ST changes does not appear to be dynamic ischemic changes    Radiology:  Imaging Results          X-Ray Chest AP Portable (Final result)  Result time 06/27/19 11:44:52    Final result by Noreen Clifton MD (06/27/19 11:44:52)                 Impression:      Mild increased pulmonary vascularity, new from the prior study      Electronically signed by: Noreen Clifton MD  Date:    06/27/2019  Time:    11:44             Narrative:    EXAMINATION:  XR CHEST AP PORTABLE    CLINICAL HISTORY:  Chest Pain;    TECHNIQUE:  Single frontal view of the chest was performed.    COMPARISON:  07/24/2015    FINDINGS:  There are sternotomy wire.  There is an abandoned lead  left upper thoracic region.  There is a small vascular stent in the upper thoracic region, unchanged.  Mild elevation of the left hemidiaphragm is unchanged from the prior study.  There is mild increased pulmonary vascularity which is new from the prior study.  No focal airspace disease.  No large pleural effusion.  No pneumothorax.                                   Assessment:     Thomas Horvath is a 28 y.o. male with:  Patient Active Problem List    Diagnosis Date Noted    VINOD (acute kidney injury) 06/27/2019    NYHA class 2 heart failure with reduced ejection  fraction 10/14/2016    Noncompliance 10/14/2016    HFrEF (heart failure with reduced ejection fraction) 09/09/2015    Aortic insufficiency 09/09/2015    Right aortic arch 09/09/2015    S/P right ventricle to pulmonary artery (RV-PA) conduit 09/09/2015    Conduit failure in patient with reconstruction for congenital heart disease 09/09/2015    S/P transcatheter replacement of pulmonary valve 09/09/2015    LVH (left ventricular hypertrophy) 09/09/2015    Drug use 07/23/2015    TOF (tetralogy of Fallot) 07/20/2015    Shingles rash 07/03/2015    Dilated cardiomyopathy 06/09/2015    Pulmonic stenosis 06/09/2015    Essential hypertension 04/28/2015    Pulmonary artery hypertension 02/10/2015    Right ventricular dilation 02/10/2015    S/P TOF (tetralogy of Fallot) repair 12/18/2014        Plan:     Weakness 2/2 heat exhaustion  - patient reporting working outside in heat, manual labor, muscle cramps, fatigue and weakness starting last night and progressing to this morning with emesis x 2  - labs c/w hemoconcentrated, initially hypotensive  - good response BP and Sx with IVF in ED  - continue gentle fluid resuscitation, improving per repeat labs, careful with cardiac history    VINOD with hyperkalemia likely 2/2 pre-renal   - Cr on admit 3.2, K 7.1  - no known renal dysfunction at baseline  - likely 2/2 volume depletion as above  - shifted, IVF, ordered digoxin level, hold spironolcatone  - improving after hydration, continue to monitor     Chronic Biventricular Combined Heart Failure  - Patient with congenital and extensive cardiac history and multiple surgeries  - last TTE 6/22/2017 with EF 38%, mild to moderate TR, SARAH with RVH, mildly depressed RV function, LVEF 35-40%, grade III diastolic dysfunction   - does not appear clinically in acute exacerbation on exam, in fact fluid down  - gentle IVF for VINOD as above  - cardiology consulted, appreciate recs: do not rec ACS workup at this time or repeat TTE,  will follow     Hx of TOF with repair, R sided aortic arch, multiple cardiac surgeries  - previously followed with cardiology, has not seen since 6/2017 2/2 missed follow up with work schedule  - cardiology consulted here, appreciate recs as above  - follow up with his cardiologist on discharge      Diet: Cardiac  Ppx: SQH  Dispo: obs, pending resolution of gross lab abnormalities and muscle cramping, on tele     Code Status:     Full    Isis Rivas  [facility] Internal Medicine HO-II  [facility] Internal Medicine Service    [facility] Medicine Hospitalist Pager numbers:   [facility] Hospitalist Medicine Team A (Dedra/Shey): [phone]  [facility] Hospitalist Medicine Team B (Terrie/Ana Maria):  [phone]

## 2019-06-27 NOTE — NURSING
Pt arrived to the unit via stretcher with pt escort. Pt AAOx3, respirations even and unlabored. NAD. Pt oriented to room and unit. Bed in lowest position and locked, siderails upx2, call bell in reach. Telemetry monitor placed. Full assessment to  be documented per EPIC flowsheet.

## 2019-06-27 NOTE — ED PROVIDER NOTES
Encounter Date: 6/27/2019    SCRIBE #1 NOTE: I, Ran Shelley, am scribing for, and in the presence of,  Dr. Lefort. I have scribed the entire note.       History     Chief Complaint   Patient presents with    Chest Pain     with weakness and cramping, +n/v  pain to chest and sheree arms, Pt had valve replacement surgery.     Thomas Horvath is a 28 y.o. male who  has a past medical history of Aortic insufficiency, Homograft cardiac valve stenosis, Hypertension, Left ventricular dysfunction, Pulmonary artery hypertension, Right ventricular dilation, S/P TOF (tetralogy of Fallot) repair (12/18/2014), and TOF (tetralogy of Fallot).    The patient presents to the ED due to chest pain. The patient describes the pain as cramping and states it radiates across his chest to both of his arms. He reports neck pain, back pain, and nausea with two episodes of vomiting. He denies hematemesis, shortness of breath, or abdominal pain. Per EMS, the patient was given Nitro and Aspirin when in route to this facility. The patient notes he had open heart surgery for a valve replacement. The patient reports no other symptoms at this time.    The history is provided by the patient and the EMS personnel.     Review of patient's allergies indicates:  No Known Allergies  Past Medical History:   Diagnosis Date    Aortic insufficiency     trace    Homograft cardiac valve stenosis     mild    Hypertension     Left ventricular dysfunction     Pulmonary artery hypertension     RV hypertension    Right ventricular dilation     S/P TOF (tetralogy of Fallot) repair 12/18/2014    TOF (tetralogy of Fallot)      Past Surgical History:   Procedure Laterality Date    balloon dilation of pulmonary atrery homograft  2008    CARDIAC VALVE SURGERY      7/23/15 @ [facility] [place]    epicardial pacing wires      LV    pulmonary valve replacement  July 7, 2006    REPLACEMENT-VALVE-PULMONARY N/A 7/23/2015    [facility] CATH LAB     [facility] WITH RETRO [facility] CONGEITAL CARD ABN N/A 2015    [facility] at [facility] CATH LAB    TETRALOGY OF FALLOT REPAIR       Family History   Problem Relation Age of Onset    Hypertension Mother     No Known Problems Father     No Known Problems Sister     No Known Problems Brother     No Known Problems Maternal Grandmother     No Known Problems Maternal Grandfather     No Known Problems Paternal Grandmother     No Known Problems Paternal Grandfather     No Known Problems Maternal Aunt     No Known Problems Maternal Uncle     No Known Problems Paternal Aunt     No Known Problems Paternal Uncle     Anemia Neg Hx     Arrhythmia Neg Hx     Asthma Neg Hx     Clotting disorder Neg Hx     Fainting Neg Hx     Heart attack Neg Hx     Heart disease Neg Hx     Heart failure Neg Hx     Hyperlipidemia Neg Hx     Stroke Neg Hx     Atrial Septal Defect Neg Hx      Social History     Tobacco Use    Smoking status: Former Smoker     Start date: 2006     Last attempt to quit: 2015     Years since quittin.0    Smokeless tobacco: Never Used    Tobacco comment: marijuana   Substance Use Topics    Alcohol use: No     Alcohol/week: 0.0 oz    Drug use: Yes     Frequency: 7.0 times per week     Types: Marijuana     Review of Systems   Constitutional: Negative for chills and fever.   HENT: Negative for congestion, ear pain, rhinorrhea and sore throat.    Respiratory: Negative for cough, shortness of breath and wheezing.    Cardiovascular: Positive for chest pain. Negative for palpitations.   Gastrointestinal: Positive for nausea and vomiting. Negative for abdominal pain and diarrhea.   Genitourinary: Negative for dysuria and hematuria.   Musculoskeletal: Positive for back pain and neck pain. Negative for myalgias.   Skin: Negative for rash.   Neurological: Negative for dizziness, weakness, light-headedness and headaches.   Psychiatric/Behavioral: Negative for confusion.        Physical Exam     Initial Vitals [06/27/19 1049]   BP Pulse Resp Temp SpO2   (!) 86/68 76 18 -- 99 %      MAP       --         Physical Exam    Nursing note and vitals reviewed.  Constitutional: He appears well-developed and well-nourished. He is diaphoretic.   HENT:   Head: Normocephalic and atraumatic.   Dry mucous membranes.   Eyes: Conjunctivae and EOM are normal. Pupils are equal, round, and reactive to light.   Neck: Normal range of motion. Neck supple.   Cardiovascular: Normal rate and regular rhythm. Exam reveals gallop and S3.    No murmur heard.  Pulmonary/Chest: Breath sounds normal. He has no wheezes. He has no rhonchi. He has no rales.   Sternotomy scar     Abdominal: Soft. There is no tenderness. There is no rebound and no guarding.   Musculoskeletal: Normal range of motion. He exhibits no edema or tenderness.   Soft muscular compartments.    Neurological: He is alert and oriented to person, place, and time. He has normal strength.   Skin: Capillary refill takes less than 2 seconds.   Skin is cool.          ED Course   Critical Care  Date/Time: 6/27/2019 12:31 PM  Performed by: Guy J. Lefort, MD  Authorized by: Guy J. Lefort, MD   Direct patient critical care time: 5 minutes  Additional history critical care time: 5 minutes  Ordering / reviewing critical care time: 5 minutes  Documentation critical care time: 5 minutes  Consulting other physicians critical care time: 5 minutes  Consult with family critical care time: 5 minutes  Other critical care time: 5 minutes  Total critical care time (exclusive of procedural time) : 35 minutes  Critical care was necessary to treat or prevent imminent or life-threatening deterioration of the following conditions: cardiac failure.  Critical care was time spent personally by me on the following activities: blood draw for specimens, development of treatment plan with patient or surrogate, discussions with primary provider, discussions with consultants,  evaluation of patient's response to treatment, examination of patient, ordering and performing treatments and interventions, ordering and review of laboratory studies, ordering and review of radiographic studies, re-evaluation of patient's condition and review of old charts.        Labs Reviewed   CBC W/ AUTO DIFFERENTIAL - Abnormal; Notable for the following components:       Result Value    RBC 6.59 (*)     Hemoglobin 18.5 (*)     Hematocrit 56.6 (*)     Gran # (ANC) 10.0 (*)     Mono # 1.1 (*)     Gran% 79.4 (*)     Lymph% 9.8 (*)     All other components within normal limits   POCT GLUCOSE - Abnormal; Notable for the following components:    POCT Glucose 114 (*)     All other components within normal limits   TROPONIN I   COMPREHENSIVE METABOLIC PANEL   URINALYSIS, REFLEX TO URINE CULTURE   MAGNESIUM   CK          Imaging Results          X-Ray Chest AP Portable (Final result)  Result time 06/27/19 11:44:52    Final result by Noreen Clifton MD (06/27/19 11:44:52)                 Impression:      Mild increased pulmonary vascularity, new from the prior study      Electronically signed by: Noreen Clifton MD  Date:    06/27/2019  Time:    11:44             Narrative:    EXAMINATION:  XR CHEST AP PORTABLE    CLINICAL HISTORY:  Chest Pain;    TECHNIQUE:  Single frontal view of the chest was performed.    COMPARISON:  07/24/2015    FINDINGS:  There are sternotomy wire.  There is an abandoned lead  left upper thoracic region.  There is a small vascular stent in the upper thoracic region, unchanged.  Mild elevation of the left hemidiaphragm is unchanged from the prior study.  There is mild increased pulmonary vascularity which is new from the prior study.  No focal airspace disease.  No large pleural effusion.  No pneumothorax.                                 Medical Decision Making:   Initial Assessment:   28 y.o male presents to the ED due to chest pain.       Differential Diagnosis:   Myocardial ischemia,  pericarditis, pulmonary embolus, chest wall pain, pleural inflammation and pulmonary infectious causes.    Independently Interpreted Test(s):   I have ordered and independently interpreted X-rays - see prior notes.  I have ordered and independently interpreted EKG Reading(s) - see prior notes  Clinical Tests:   Lab Tests: Ordered and Reviewed  Radiological Study: Ordered and Reviewed  Medical Tests: Ordered and Reviewed  ED Management:  Initial pre-hospital complaint of chest pain appears to be secondary to diffuse musculoskeletal pain brought on by heat exposure and severe dehydration.  Discussed with Cardiology and agree EKGs appear stable to patient's previous likely abnormal due to history of tetralogy of Fallot with surgical repair.  Fluid resuscitation given noted to have electrolyte derangements of potassium calcium and magnesium.  Treated for hyperkalemia in the department.  Significant acute kidney injury present. Place on observation for further management.                      Clinical Impression:       ICD-10-CM ICD-9-CM   1. VINOD (acute kidney injury) N17.9 584.9   2. Chest pain R07.9 786.50   3. Heat exhaustion, initial encounter T67.5XXA 992.5   4. Hyperkalemia E87.5 276.7   5. Hypercalcemia E83.52 275.42   6. Hypermagnesemia E83.41 275.2   7. Volume depletion E86.9 276.50   8. Chronic combined systolic and diastolic heart failure I50.42 428.42   9. S/P TOF (tetralogy of Fallot) repair Z87.74 V13.65   10. TOF (tetralogy of Fallot) Q21.3 745.2         Disposition:   Disposition: Discharged  Condition: Stable    Scribe Attestation I, Dr. Guy Lefort, personally performed the services described in this documentation. All medical record entries made by the scribe were at my direction and in my presence. I have reviewed the chart and agree that the record reflects my personal performance and is accurate and complete. Guy Lefort, MD.  2:19 AM 06/29/2019                      Guy J. Lefort, MD  06/29/19  1497

## 2019-06-27 NOTE — CONSULTS
U Cardiology Consult - Fellow Note    Consultant Attending: Dr. Sheets  Consultant Resident: Gordo    Reason for Consult:     Chest Pain    Subjective:      History of Present Illness:  Thomas Horvath is a 28 y.o.  male who  has a past medical history of Aortic insufficiency, Homograft cardiac valve stenosis, Hypertension, Left ventricular dysfunction, Pulmonary artery hypertension, Right ventricular dilation, S/P TOF (tetralogy of Fallot) repair (12/18/2014), and TOF (tetralogy of Fallot).. The patient presented to the [facility] on 6/27/2019 with a primary complaint of Chest Pain (with weakness and cramping, +n/v  pain to chest and sheree arms, Pt had valve replacement surgery.)    Complex cardiac history in the past with follow up with congential specialist. 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 He states that he woke up with some cramping in his left thigh and went to work. He states that he was then significant diaphoretic and this was followed by cramping that started in his stomach and then went to his chest, arms and neck. The pains are not pressure-like but a cramping. He states the pain is similar throughout his entire body. He has never had such a pain before. The patient then states that he went to occupational health where a heart rate in the 30s was noted along with diaphoresis which then came up to the 50s and EMS was called. On the way to the hospital the heart rate was noted to be in the 70s on ECG. He continues to have cramping on exam.    Past Medical History:  Past Medical History:   Diagnosis Date    Aortic insufficiency     trace    Homograft cardiac valve stenosis     mild    Hypertension     Left ventricular dysfunction     Pulmonary artery hypertension     RV hypertension    Right ventricular dilation     S/P TOF (tetralogy of Fallot) repair 12/18/2014    TOF (tetralogy of Fallot)        Past Surgical History:  Past  Surgical History:   Procedure Laterality Date    balloon dilation of pulmonary atrery homograft  2008    CARDIAC VALVE SURGERY      7/23/15 [place] [facility]    epicardial pacing wires      LV    pulmonary valve replacement  July 7, 2006    REPLACEMENT-VALVE-PULMONARY N/A 7/23/2015    Performed by Jayson Diez Jr., MD at [facility] C[facility] LAB    RHC WITH RETRO C CONGEITAL CARD ABN N/A 7/23/2015    Performed by Jayson Diez Jr., MD at [facility] CATH LAB    TETRALOGY OF FALLOT REPAIR  1990       Allergies:  Review of patient's allergies indicates:  No Known Allergies    Medications:   In-Hospital Scheduled Medications:   sodium chloride 0.9%  1,000 mL Intravenous Once      In-Hospital PRN Medications:     In-Hospital IV Infusion Medications:     Home Medications:  Prior to Admission medications    Medication Sig Start Date End Date Taking? Authorizing Provider   aspirin 81 MG Chew Take 1 tablet (81 mg total) by mouth once daily. 10/14/16 10/14/17  Kelvin Harmon MD   carvedilol (COREG) 25 MG tablet Take 1 tablet (25 mg total) by mouth 2 (two) times daily. 6/23/17 6/23/18  Ino Corona MD   digoxin (LANOXIN) 125 mcg tablet Take 125 mcg by mouth once daily.    Historical Provider, MD   fluticasone (FLONASE) 50 mcg/actuation nasal spray 1 spray (50 mcg total) by Each Nare route 2 (two) times daily as needed for Rhinitis. 11/4/18   Jamal Rodriguez NP   lisinopril (PRINIVIL,ZESTRIL) 20 MG tablet Take 1 tablet (20 mg total) by mouth every evening. 6/23/17 6/23/18  Ino Corona MD   loratadine (CLARITIN) 10 mg tablet Take 1 tablet (10 mg total) by mouth once daily. 11/4/18 11/4/19  Jamal Rodriguez NP   spironolactone (ALDACTONE) 25 MG tablet Take 0.5 tablets (12.5 mg total) by mouth once daily. 6/23/17 6/23/18  Ino Corona MD       Family History:  Family History   Problem Relation Age of Onset    Hypertension Mother     No Known Problems Father     No Known Problems Sister     No Known Problems Brother     No Known  "Problems Maternal Grandmother     No Known Problems Maternal Grandfather     No Known Problems Paternal Grandmother     No Known Problems Paternal Grandfather     No Known Problems Maternal Aunt     No Known Problems Maternal Uncle     No Known Problems Paternal Aunt     No Known Problems Paternal Uncle     Anemia Neg Hx     Arrhythmia Neg Hx     Asthma Neg Hx     Clotting disorder Neg Hx     Fainting Neg Hx     Heart attack Neg Hx     Heart disease Neg Hx     Heart failure Neg Hx     Hyperlipidemia Neg Hx     Stroke Neg Hx     Atrial Septal Defect Neg Hx        Social History:  Social History     Tobacco Use    Smoking status: Former Smoker     Start date: 2006     Last attempt to quit: 2015     Years since quittin.0    Smokeless tobacco: Never Used    Tobacco comment: marijuana   Substance Use Topics    Alcohol use: No     Alcohol/week: 0.0 oz    Drug use: Yes     Frequency: 7.0 times per week     Types: Marijuana       Review of Systems:  Pertinent items are noted in HPI. All other systems are reviewed and are negative.    Currently on File:   Most Recent Immunizations   Administered Date(s) Administered    DTP 1994    HIB 1994    Hepatitis B, Pediatric/Adolescent 1994    Influenza 12/15/2009    Influenza - Trivalent - PF (PED) 2014    Influenza A (H1N1) 2009 Monovalent - IM 12/15/2009    MMR 1994    OPV 1994    Td (ADULT) 2002        Objective:   Last 24 Hour Vital Signs:  BP  Min: 86/68  Max: 86/68  Pulse  Av  Min: 76  Max: 76  Resp  Av  Min: 18  Max: 18  SpO2  Av %  Min: 99 %  Max: 99 %  Height  Av' 6" (167.6 cm)  Min: 5' 6" (167.6 cm)  Max: 5' 6" (167.6 cm)  Weight  Av.5 kg (195 lb)  Min: 88.5 kg (195 lb)  Max: 88.5 kg (195 lb)  No intake/output data recorded.    Physical Examination:  General: Alert, in no acute distress, sitting in bed, mild diaphoresis   Head:NCAT, no abnormalities  Eyes: PERRL, " EOMI, anicteric  Neck: No obvious masses, deformity  Lungs: CTAB, no wheezing, symmetric expansion  Chest wall: no deformity  Heart: RRR,  s1/s2 normal, systolic murmur III/VI best heard at LSB  Abdomen: soft, nt/nd, no obvious masses   Extremities: No obvious trauma, no cyanosis or edema  Pulses: 1+ Pulses in extremities  Neurologic: Cranial Nerves grossly intact, no obvious deficits, AAOx3        Laboratory Results:    Trended Lab Data:  No results for input(s): WBC, HGB, HCT, PLT, MCV, RDW, NA, K, CL, CO2, BUN, LABCREA, GLU, PROT, ALBUMIN, BILITOT, AST, ALKPHOS, ALT in the last 168 hours.    Trended Cardiac Data:  No results for input(s): TROPONINI, CKTOTAL, CKMB, BNP in the last 168 hours.    Other Laboratory Data:  Unavailable at time of note    Other Results:  EKG (my interpretation): sinus with rbbb and significant QRS fractionation    Bedside echo difficult to interpret but appeared like a dilated cardiomyopathy. Difficult to visualize IVC    Radiology:  No results found.       Assessment:     Thomas Horvath is a 28 y.o. male with chest pain and cramping. Cardiology consulted for chest pain.      Recommendations:   Acute  # Rhabdomyolysis- possibly related to significant heat exposure  # Acute Kidney Injury - elevated Cr on admit likely heat exposure  # Hyperkalmia - elevated on admit  # Hypotension - systolic pressures in the 80s, asx, likely hypovolemic, Hgb is significantly elevated suggesting he is concentrated and Cr elevated with VINOD     Chronic  # Tetrology of Fallot - severe homograft stenosis, peak gradient 40 mmHg. Seen Dr. 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  # RV to pulmonary artery conduit stent with  Ambrocio [id number] on 22 x 4 BIB (x3), postdilated with Reina 22 mm balloon (16 atmospheres).  # Anne valve - implantation on 22-Maltese Ensemble, residual gradient 9 mmHg, trivial insufficiency on past assesment    Recommendations  - Treat hyperkalemia aggressively  - Avoid his home lisinopril at this time  - Would not proceed with ACS evaluation or treatment at this time  - Will need hydration for his hypotension  - Would NOT order an echocardiogram at this time as this admission does not appear related to his heart  - Monitor electrolytes closely  - Closely monitor volume status given his cardiomyopathy he may not tolerate too much volume  - Monitor I/Os  - Continue on telemetry  - Rest per primary    Shashi Caro MD  [facility] Fellow  [facility] Service

## 2019-06-27 NOTE — PROGRESS NOTES
VN cued into room to complete admit assessment. Plan of care reviewed with patient. Patient verbalized understanding. Pt informed of fall risk and fall precautions, call light within reach, 2x bed rails, pt wearing slip resistant socks. No complaints if pain at this time. Patient notified to ask staff for assistance and pt verbalized complete understanding. Will continue to monitor as needed.     06/27/19 1503   Type of Frequent Check   Type Patient Rounds   Safety/Activity   Patient Rounds bed in low position;clutter free environment maintained;visualized patient   Safety Promotion/Fall Prevention commode/urinal/bedpan at bedside;Fall Risk reviewed with patient/family;side rails raised x 2;instructed to call staff for mobility   Safety Precautions emergency equipment at bedside   Positioning   Body Position sitting up in bed   Head of Bed (HOB) HOB at 30-45 degrees   Pain/Comfort/Sleep   Preferred Pain Scale number (Numeric Rating Pain Scale)   Comfort/Acceptable Pain Level 7   Pain Rating (0-10): Rest 0   Pain Rating (0-10): Activity 0        Cardiac/Telemetry Details / Alarms   Cardiac/Telemetry Monitor On Yes   Cardiac/Telemetry Audible Yes   Cardiac/Telemetry Alarms Set Yes   Assessments (Pre/Post)   Level of Consciousness (AVPU) alert

## 2019-06-28 VITALS
DIASTOLIC BLOOD PRESSURE: 99 MMHG | TEMPERATURE: 97 F | BODY MASS INDEX: 30.48 KG/M2 | HEIGHT: 67 IN | OXYGEN SATURATION: 98 % | HEART RATE: 76 BPM | WEIGHT: 194.19 LBS | SYSTOLIC BLOOD PRESSURE: 173 MMHG | RESPIRATION RATE: 20 BRPM

## 2019-06-28 LAB
ALBUMIN SERPL BCP-MCNC: 3.4 G/DL (ref 3.5–5.2)
ALP SERPL-CCNC: 75 U/L (ref 55–135)
ALT SERPL W/O P-5'-P-CCNC: 16 U/L (ref 10–44)
ANION GAP SERPL CALC-SCNC: 11 MMOL/L (ref 8–16)
ANION GAP SERPL CALC-SCNC: 7 MMOL/L (ref 8–16)
AST SERPL-CCNC: 31 U/L (ref 10–40)
BASOPHILS # BLD AUTO: 0.02 K/UL (ref 0–0.2)
BASOPHILS NFR BLD: 0.2 % (ref 0–1.9)
BILIRUB SERPL-MCNC: 0.4 MG/DL (ref 0.1–1)
BUN SERPL-MCNC: 26 MG/DL (ref 6–20)
BUN SERPL-MCNC: 30 MG/DL (ref 6–20)
CALCIUM SERPL-MCNC: 9.4 MG/DL (ref 8.7–10.5)
CALCIUM SERPL-MCNC: 9.7 MG/DL (ref 8.7–10.5)
CHLORIDE SERPL-SCNC: 103 MMOL/L (ref 95–110)
CHLORIDE SERPL-SCNC: 106 MMOL/L (ref 95–110)
CHLORIDE UR-SCNC: 80 MMOL/L (ref 25–200)
CO2 SERPL-SCNC: 24 MMOL/L (ref 23–29)
CO2 SERPL-SCNC: 26 MMOL/L (ref 23–29)
CREAT SERPL-MCNC: 1.3 MG/DL (ref 0.5–1.4)
CREAT SERPL-MCNC: 1.4 MG/DL (ref 0.5–1.4)
CREAT UR-MCNC: 154.9 MG/DL (ref 23–375)
DIFFERENTIAL METHOD: ABNORMAL
EOSINOPHIL # BLD AUTO: 0.5 K/UL (ref 0–0.5)
EOSINOPHIL NFR BLD: 5 % (ref 0–8)
ERYTHROCYTE [DISTWIDTH] IN BLOOD BY AUTOMATED COUNT: 13.6 % (ref 11.5–14.5)
EST. GFR  (AFRICAN AMERICAN): >60 ML/MIN/1.73 M^2
EST. GFR  (AFRICAN AMERICAN): >60 ML/MIN/1.73 M^2
EST. GFR  (NON AFRICAN AMERICAN): >60 ML/MIN/1.73 M^2
EST. GFR  (NON AFRICAN AMERICAN): >60 ML/MIN/1.73 M^2
GLUCOSE SERPL-MCNC: 104 MG/DL (ref 70–110)
GLUCOSE SERPL-MCNC: 96 MG/DL (ref 70–110)
HCT VFR BLD AUTO: 41.9 % (ref 40–54)
HGB BLD-MCNC: 13.5 G/DL (ref 14–18)
LYMPHOCYTES # BLD AUTO: 1.3 K/UL (ref 1–4.8)
LYMPHOCYTES NFR BLD: 14.3 % (ref 18–48)
MCH RBC QN AUTO: 27.5 PG (ref 27–31)
MCHC RBC AUTO-ENTMCNC: 32.2 G/DL (ref 32–36)
MCV RBC AUTO: 85 FL (ref 82–98)
MONOCYTES # BLD AUTO: 1.2 K/UL (ref 0.3–1)
MONOCYTES NFR BLD: 12.5 % (ref 4–15)
NEUTROPHILS # BLD AUTO: 6.2 K/UL (ref 1.8–7.7)
NEUTROPHILS NFR BLD: 68 % (ref 38–73)
PLATELET # BLD AUTO: 148 K/UL (ref 150–350)
PMV BLD AUTO: 10.7 FL (ref 9.2–12.9)
POTASSIUM SERPL-SCNC: 4.3 MMOL/L (ref 3.5–5.1)
POTASSIUM SERPL-SCNC: 4.3 MMOL/L (ref 3.5–5.1)
POTASSIUM UR-SCNC: 17 MMOL/L (ref 15–95)
PROT SERPL-MCNC: 6.2 G/DL (ref 6–8.4)
RBC # BLD AUTO: 4.91 M/UL (ref 4.6–6.2)
SODIUM SERPL-SCNC: 138 MMOL/L (ref 136–145)
SODIUM SERPL-SCNC: 139 MMOL/L (ref 136–145)
SODIUM UR-SCNC: 88 MMOL/L (ref 20–250)
UUN UR-MCNC: >1200 MG/DL (ref 140–1050)
WBC # BLD AUTO: 9.19 K/UL (ref 3.9–12.7)

## 2019-06-28 PROCEDURE — 80053 COMPREHEN METABOLIC PANEL: CPT

## 2019-06-28 PROCEDURE — 25000003 PHARM REV CODE 250: Performed by: STUDENT IN AN ORGANIZED HEALTH CARE EDUCATION/TRAINING PROGRAM

## 2019-06-28 PROCEDURE — 36415 COLL VENOUS BLD VENIPUNCTURE: CPT

## 2019-06-28 PROCEDURE — G0378 HOSPITAL OBSERVATION PER HR: HCPCS

## 2019-06-28 PROCEDURE — 94761 N-INVAS EAR/PLS OXIMETRY MLT: CPT

## 2019-06-28 PROCEDURE — 84540 ASSAY OF URINE/UREA-N: CPT

## 2019-06-28 PROCEDURE — 82436 ASSAY OF URINE CHLORIDE: CPT

## 2019-06-28 PROCEDURE — 84300 ASSAY OF URINE SODIUM: CPT

## 2019-06-28 PROCEDURE — 82570 ASSAY OF URINE CREATININE: CPT

## 2019-06-28 PROCEDURE — 84133 ASSAY OF URINE POTASSIUM: CPT

## 2019-06-28 PROCEDURE — 85025 COMPLETE CBC W/AUTO DIFF WBC: CPT

## 2019-06-28 RX ORDER — DIGOXIN 125 MCG
125 TABLET ORAL DAILY
Qty: 30 TABLET | Refills: 11 | Status: SHIPPED | OUTPATIENT
Start: 2019-06-28 | End: 2021-05-20 | Stop reason: SDUPTHER

## 2019-06-28 RX ORDER — NAPROXEN SODIUM 220 MG/1
81 TABLET, FILM COATED ORAL DAILY
Qty: 30 TABLET | Refills: 11 | Status: SHIPPED | OUTPATIENT
Start: 2019-06-28 | End: 2023-03-23 | Stop reason: SDUPTHER

## 2019-06-28 RX ORDER — LISINOPRIL 20 MG/1
20 TABLET ORAL NIGHTLY
Qty: 30 TABLET | Refills: 11 | Status: SHIPPED | OUTPATIENT
Start: 2019-06-28 | End: 2021-05-20

## 2019-06-28 RX ORDER — SPIRONOLACTONE 25 MG/1
12.5 TABLET ORAL DAILY
Qty: 30 TABLET | Refills: 6 | Status: SHIPPED | OUTPATIENT
Start: 2019-06-28 | End: 2021-05-20

## 2019-06-28 RX ORDER — CARVEDILOL 25 MG/1
25 TABLET ORAL 2 TIMES DAILY
Qty: 60 TABLET | Refills: 6 | Status: SHIPPED | OUTPATIENT
Start: 2019-06-28 | End: 2021-05-20 | Stop reason: SDUPTHER

## 2019-06-28 RX ADMIN — ASPIRIN 81 MG 81 MG: 81 TABLET ORAL at 09:06

## 2019-06-28 RX ADMIN — CARVEDILOL 25 MG: 25 TABLET, FILM COATED ORAL at 09:06

## 2019-06-28 RX ADMIN — SODIUM CHLORIDE, SODIUM LACTATE, POTASSIUM CHLORIDE, AND CALCIUM CHLORIDE 1000 ML: .6; .31; .03; .02 INJECTION, SOLUTION INTRAVENOUS at 01:06

## 2019-06-28 NOTE — PLAN OF CARE
Future Appointments   Date Time Provider Department Center   7/10/2019  2:30 PM Ino Corona MD [facility]         Discharge rounds on patient. Discussed followup appointments, blue discharge folder, discharge nurse will go over home medications and reasons for medications and final discharge instructions. All patient/caregiver questions answered. Patient verbalized understanding.       06/28/19 1230   Final Note   Assessment Type Final Discharge Note   Anticipated Discharge Disposition Home   Hospital Follow Up  Appt(s) scheduled? Yes   Discharge plans and expectations educations in teach back method with documentation complete? Yes   Right Care Referral Info   Post Acute Recommendation No Care     Geovanna Mckay, RN, CCM, CMSRN  RN Transition Navigator  [phone]

## 2019-06-28 NOTE — PLAN OF CARE
VN note: VN completed AVS and attachments and notified bedside nurseGayle. Waiting for family to arrive to admin discharge education. Will cont to be available and intervene prn.

## 2019-06-28 NOTE — PROGRESS NOTES
"U Internal Medicine Resident HO-II Progress Note    Subjective:      Thomas Horvath is a 28 y.o. male who is being followed by the [facility] service at [facility] for VINOD, hyperkalemia.     This morning feels back to baseline. No muscle cramps, no chest pain. Good appetite and eating breakfast.      Objective:   Last 24 Hour Vital Signs:  BP  Min: 86/68  Max: 145/86  Temp  Av.6 °F (36.4 °C)  Min: 96.7 °F (35.9 °C)  Max: 98.8 °F (37.1 °C)  Pulse  Av.1  Min: 68  Max: 88  Resp  Av.7  Min: 18  Max: 29  SpO2  Av.1 %  Min: 98 %  Max: 100 %  Height  Av' 6.5" (168.9 cm)  Min: 5' 6" (167.6 cm)  Max: 5' 7" (170.2 cm)  Weight  Av.3 kg (194 lb 9.6 oz)  Min: 88.1 kg (194 lb 3.2 oz)  Max: 88.5 kg (195 lb)  I/O last 3 completed shifts:  In: 1000 [IV Piggyback:1000]  Out: 600 [Urine:600]    Physical Examination:  General:          Alert and awake in no apparent distress, eating breakfast  Head:               Normocephalic and atraumatic  Eyes:               EOMi with anicteric sclera and clear conjunctivae  Mouth:             Oropharynx clear and without exudate; moist mucous membranes  Cardio:             Regular rate and rhythm, fixed split mechanical S2 best heard over LUSB, diastolic murmur appreciated   Resp:               CTAB; respirations unlabored; no wheezes, crackles or rhonchi  Abdom:            Soft, NTND with normoactive bowel sounds  Extrem:            Warm and well-perfused with no clubbing, cyanosis or edema  Skin:                No rashes, lesions, or color changes  Pulses:            2+ and symmetric distally  Neuro:             AAOx3; cooperative and pleasant with no focal deficits    Laboratory:  Laboratory Data Reviewed: yes  Pertinent Findings:  Cr 3.2 -> 1.3  K 7.1 -> 4.3      Microbiology Data Reviewed: yes  Pertinent Findings:  N/A    Other Results:  EKG (my interpretation): repeat studies unchaged.    Radiology Data Reviewed: yes  Pertinent " Findings:  No interval studies since 6/27    Current Medications:     Infusions:       Scheduled:   aspirin  81 mg Oral Daily    carvedilol  25 mg Oral BID    heparin (porcine)  5,000 Units Subcutaneous Q8H    sodium polystyrene  30 g Oral Once        PRN:  sodium chloride 0.9%    Antibiotics and Day Number of Therapy:  None    Lines and Day Number of Therapy:  PIV    Assessment:     Thomas Horvath is a 28 y.o.male with  Patient Active Problem List    Diagnosis Date Noted    VINOD (acute kidney injury) 06/27/2019    Hyperkalemia 06/27/2019    Volume depletion 06/27/2019    Heat exhaustion 06/27/2019    Chronic combined systolic and diastolic heart failure 06/27/2019    NYHA class 2 heart failure with reduced ejection fraction 10/14/2016    Noncompliance 10/14/2016    HFrEF (heart failure with reduced ejection fraction) 09/09/2015    Aortic insufficiency 09/09/2015    Right aortic arch 09/09/2015    S/P right ventricle to pulmonary artery (RV-PA) conduit 09/09/2015    Conduit failure in patient with reconstruction for congenital heart disease 09/09/2015    S/P transcatheter replacement of pulmonary valve 09/09/2015    LVH (left ventricular hypertrophy) 09/09/2015    Drug use 07/23/2015    TOF (tetralogy of Fallot) 07/20/2015    Shingles rash 07/03/2015    Dilated cardiomyopathy 06/09/2015    Pulmonic stenosis 06/09/2015    Essential hypertension 04/28/2015    Pulmonary artery hypertension 02/10/2015    Right ventricular dilation 02/10/2015    S/P TOF (tetralogy of Fallot) repair 12/18/2014        Plan:     Weakness 2/2 heat exhaustion  - patient reporting working outside in heat, manual labor, muscle cramps, fatigue and weakness starting last night and progressing to this morning with emesis x 2  - labs c/w hemoconcentrated, initially hypotensive  - good response BP and Sx with IVF in ED  - continue gentle fluid resuscitation,symptoms improved     VINOD with hyperkalemia likely 2/2 pre-renal,  improved  - Cr on admit 3.2, K 7.1  - no known renal dysfunction at baseline  - likely 2/2 volume depletion as above  - shifted, IVF, ordered digoxin level, hold spironolcatone  - improved VINOD and resolved hyperkalemia     Chronic Biventricular Combined Heart Failure  - Patient with congenital and extensive cardiac history and multiple surgeries  - last TTE 6/22/2017 with EF 38%, mild to moderate TR, SARAH with RVH, mildly depressed RV function, LVEF 35-40%, grade III diastolic dysfunction   - does not appear clinically in acute exacerbation on exam, in fact fluid down  - gentle IVF for VINOD as above  - cardiology consulted, appreciate recs: do not rec ACS workup at this time or repeat TTE, follow up with home cardiology   - dig level 0.04     Hx of TOF with repair, R sided aortic arch, multiple cardiac surgeries  - previously followed with cardiology, has not seen since 6/2017 2/2 missed follow up with work schedule  - cardiology consulted here, appreciate recs as above  - follow up with his cardiologist on discharge        Diet: Cardiac  Ppx: SQH  Dispo: likely home today no needs    Isis Rivas  U Internal Medicine HO-II  U Internal Medicine Service Team A    [facility] Medicine Hospitalist Pager numbers:   [facility] Hospitalist Medicine Team A (Dedra/Shey): [phone]  [facility] Hospitalist Medicine Team B (Terrie/Ana Maria):  [phone]

## 2019-06-28 NOTE — PLAN OF CARE
Problem: Adult Inpatient Plan of Care  Goal: Plan of Care Review  Outcome: Ongoing (interventions implemented as appropriate)  18 gauge IV to left A/C removed without difficulty- no redness or swelling noted to site at time of removal. 18 gauge to right A/C removed without difficulty- no redness or swelling noted to site at time of removal. Tele monitor removed, cleaned, and returned to tele bunker. D/C instructions and medications reviewed with patient per Latanya PECK- verbalizes understanding. FIDEL at time of D/C.

## 2019-06-28 NOTE — PLAN OF CARE
AVS and educational attachments shared with patient via [employer]. Discussed thoroughly. Patient verbalized clear understanding using teach back method. Notified bedside nurse of completion of education. At present no distress noted. Patient to be discharged via w/c with escort service and family with all of patient's belonings. Will cont to be available to patient and family and intervene prn.

## 2019-06-28 NOTE — DISCHARGE INSTRUCTIONS
Dehydration (Adult)  Dehydration occurs when your body loses too much fluid. This may be the result of prolonged vomiting or diarrhea, excessive sweating, or a high fever. It may also happen if you dont drink enough fluid when youre sick or out in the heat. Misuse of diuretics (water pills) can also be a cause.  Symptoms include thirst and decreased urine output. You may also feel dizzy, weak, fatigued, or very drowsy. The diet described below is usually enough to treat dehydration. In some cases, you may need medicine.  Home care  · Drink at least 12 8-ounce glasses of fluid every day to resolve the dehydration. Fluid may include water; orange juice; lemonade; apple, grape, or cranberry juice; clear fruit drinks; electrolyte replacement and sports drinks; and teas and coffee without caffeine. If you have been diagnosed with a kidney disease, ask your doctor how much and what types of fluids you should drink to prevent dehydration. If you have kidney disease, fluid can build up in the body. This can be dangerous to your health.  · If you have a fever, muscle aches, or a headache as a result of a cold or flu, you may take acetaminophen or ibuprofen, unless another medicine was prescribed. If you have chronic liver or kidney disease, or have ever had a stomach ulcer or gastrointestinal bleeding, talk with your health care provider before using these medicines. Don't take aspirin if you are younger than 18 and have a fever. Aspirin raises the chance for severe liver injury.  Follow-up care  Follow up with your health care provider, or as advised.  When to seek medical advice  Call your health care provider right away if any of these occur:  · Continued vomiting  · Frequent diarrhea (more than 5 times a day); blood (red or black color) or mucus in diarrhea  · Blood in vomit or stool  · Swollen abdomen or increasing abdominal pain  · Weakness, dizziness, or fainting  · Unusual drowsiness or confusion  · Reduced urine  output or extreme thirst  · Fever of 100.4°F (34°C) or higher  Date Last Reviewed: 5/31/2015  © 0611-1194 [employer]. [address] [place] [address], [place] [address]. All rights reserved. This information is not intended as a substitute for professional medical care. Always follow your healthcare professional's instructions.      Kidney Injury, Acute, Discharge Instructions for (English) View Edit Remove  Heat Stress: Warning Signs (English) View Edit Remove  Heat Exhaustion (English) View Edit Remove  Heat-related Illness in Your Child, Understanding and Preventing (English) View Edit Remove

## 2019-06-28 NOTE — DISCHARGE SUMMARY
[facility] Internal Medicine Discharge Summary    Primary Team: [facility] Internal Medicine Team A  Attending Physician: Neris Kat MD  Resident: Rob  Intern: Sangeetha    Date of Admit: 6/27/2019  Date of Discharge: 6/28/2019    Discharge to: Home  Condition: Stable    Discharge Diagnoses     Patient Active Problem List   Diagnosis    S/P TOF (tetralogy of Fallot) repair    Pulmonary artery hypertension    Right ventricular dilation    Essential hypertension    Dilated cardiomyopathy    Pulmonic stenosis    Shingles rash    TOF (tetralogy of Fallot)    Drug use    HFrEF (heart failure with reduced ejection fraction)    Aortic insufficiency    Right aortic arch    S/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 male who  has a past medical history of Aortic insufficiency, Homograft cardiac valve stenosis, Hypertension, Left ventricular dysfunction, Pulmonary artery hypertension, Right ventricular dilation, S/P TOF (tetralogy of Fallot) repair (12/18/2014), and TOF (tetralogy of Fallot).  The patient presented to [facility] on 6/27/2019 with a primary complaint of Chest Pain (with weakness and cramping, +n/v  pain to chest and sheree arms, Pt had valve replacement surgery.)    Patient reports muscle cramping and fatigue starting last night and progressive after heavy lifting at work in heat today. Has been working outside daily lately. Found to  be hyperkalemic with VINOD.    For the full HPI please refer to the History & Physical from this admission.    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, manual labor, muscle cramps, fatigue and weakness starting night prior to admission, associated emesis x 2  - labs c/w hemoconcentrated, initially hypotensive  - good response BP and Sx with IVF in ED  - continue gentle fluid resuscitation,symptoms improved     VINOD with hyperkalemia likely 2/2 pre-renal, improved  - Cr on admit 3.2, K 7.1  - no known renal dysfunction at baseline  - likely 2/2 volume depletion as above  - shifted, IVF, digoxin level normal/low, ACEi and spironolactone held   - improved VINOD and resolved hyperkalemia  - resumed home meds on discharge     Chronic Biventricular Combined Heart Failure  - Patient with congenital and extensive cardiac history and multiple surgeries  - last TTE 6/22/2017 with EF 38%, mild to moderate TR, SARAH with RVH, mildly depressed RV function, LVEF 35-40%, grade III diastolic dysfunction   - did not appear clinically in acute exacerbation on exam, in fact fluid down  - gentle IVF for VINOD as above  - cardiology consulted, appreciate recs: did not rec ACS workup at this time or repeat TTE, follow up with home cardiology   - resumed home medicines on discharge     Hx of TOF with repair, R sided aortic arch, multiple cardiac surgeries  - previously followed with cardiology, has not seen since 6/2017 2/2 missed follow up with work schedule  - cardiology consulted here, appreciate recs as above  - follow up with his cardiologist, Dr. Corona on discharge    Discharge Medications        Medication List      CONTINUE taking these medications    aspirin 81 MG Chew  Take 1 tablet (81 mg total) by mouth once daily.     carvedilol 25 MG tablet  Commonly known as:  COREG  Take 1 tablet (25 mg total) by mouth 2 (two) times daily.     digoxin 125 mcg  tablet  Commonly known as:  LANOXIN  Take 1 tablet (125 mcg total) by mouth once daily.     lisinopril 20 MG tablet  Commonly known as:  PRINIVIL,ZESTRIL  Take 1 tablet (20 mg total) by mouth every evening.     spironolactone 25 MG tablet  Commonly known as:  ALDACTONE  Take 0.5 tablets (12.5 mg total) by mouth once daily.        STOP taking these medications    fluticasone propionate 50 mcg/actuation nasal spray  Commonly known as:  FLONASE     loratadine 10 mg tablet  Commonly known as:  CLARITIN           Where to Get Your Medications      These medications were sent to [facility]  [address], [place] [address]    Hours:  Mon-Fri, 8a-5:30p Phone:  [phone]   · carvedilol 25 MG tablet  · digoxin 125 mcg tablet  · lisinopril 20 MG tablet  · spironolactone 25 MG tablet     You can get these medications from any pharmacy    Bring a paper prescription for each of these medications  · aspirin 81 MG Chew         Discharge Information:   Diet:  Cardiac    Physical Activity:  As tolerated    Instructions:  1. Take all medications as prescribed  2. Keep all follow-up appointments  3. Return to the hospital or call your primary care physicians if any worsening symptoms such as temp > 100.4, muscle cramping, chest pain, dizziness/weakness, or any other concerning symptoms occur.    Follow-Up Appointments:  Follow up with your PCP and Dr. Corona (congenital heart).    Isis Rivas  [facility] Internal Medicine, [facility]

## 2019-06-29 ENCOUNTER — NURSE TRIAGE (OUTPATIENT)
Dept: ADMINISTRATIVE | Facility: CLINIC | Age: 29
End: 2019-06-29

## 2019-06-29 NOTE — TELEPHONE ENCOUNTER
Pt wants a medical release so he can return to work, I spoke to Mony Beasley RN on 4B, the floor he was released from and she took his information down and states it may not be until tomorrow that she can have it ready, I called pt and let him know

## 2019-07-09 DIAGNOSIS — Z87.74 S/P TOF (TETRALOGY OF FALLOT) REPAIR: Chronic | ICD-10-CM

## 2019-07-09 DIAGNOSIS — I27.21 PULMONARY ARTERY HYPERTENSION: Primary | ICD-10-CM

## 2019-07-09 DIAGNOSIS — I42.0 DILATED CARDIOMYOPATHY: ICD-10-CM

## 2021-02-19 DIAGNOSIS — Z98.890 S/P RIGHT VENTRICLE TO PULMONARY ARTERY (RV-PA) CONDUIT: ICD-10-CM

## 2021-02-19 DIAGNOSIS — Z87.74 S/P TOF (TETRALOGY OF FALLOT) REPAIR: Primary | ICD-10-CM

## 2021-02-19 DIAGNOSIS — I35.1 NONRHEUMATIC AORTIC VALVE INSUFFICIENCY: ICD-10-CM

## 2021-02-19 DIAGNOSIS — Q21.3 TOF (TETRALOGY OF FALLOT): ICD-10-CM

## 2021-02-19 DIAGNOSIS — Q25.47 RIGHT AORTIC ARCH: ICD-10-CM

## 2021-04-08 ENCOUNTER — OFFICE VISIT (OUTPATIENT)
Dept: CARDIOLOGY | Facility: CLINIC | Age: 31
End: 2021-04-08
Payer: MEDICAID

## 2021-04-08 ENCOUNTER — HOSPITAL ENCOUNTER (OUTPATIENT)
Dept: CARDIOLOGY | Facility: CLINIC | Age: 31
Discharge: HOME | End: 2021-04-08
Payer: MEDICAID

## 2021-04-08 ENCOUNTER — HOSPITAL ENCOUNTER (OUTPATIENT)
Dept: CARDIOLOGY | Facility: HOSPITAL | Age: 31
Discharge: HOME | End: 2021-04-08
Attending: INTERNAL MEDICINE
Payer: MEDICAID

## 2021-04-08 VITALS
DIASTOLIC BLOOD PRESSURE: 112 MMHG | HEIGHT: 67 IN | WEIGHT: 194 LBS | SYSTOLIC BLOOD PRESSURE: 164 MMHG | HEART RATE: 69 BPM | HEART RATE: 78 BPM | BODY MASS INDEX: 30.45 KG/M2 | BODY MASS INDEX: 32.18 KG/M2 | OXYGEN SATURATION: 97 % | HEIGHT: 67 IN | WEIGHT: 205 LBS

## 2021-04-08 DIAGNOSIS — Q21.3 TETRALOGY OF FALLOT: Primary | ICD-10-CM

## 2021-04-08 DIAGNOSIS — Q25.47 RIGHT AORTIC ARCH: ICD-10-CM

## 2021-04-08 DIAGNOSIS — Z98.890 S/P RIGHT VENTRICLE TO PULMONARY ARTERY (RV-PA) CONDUIT: ICD-10-CM

## 2021-04-08 DIAGNOSIS — Z87.74 S/P TOF (TETRALOGY OF FALLOT) REPAIR: ICD-10-CM

## 2021-04-08 DIAGNOSIS — Z87.74 S/P TOF (TETRALOGY OF FALLOT) REPAIR: Chronic | ICD-10-CM

## 2021-04-08 DIAGNOSIS — Q21.3 TOF (TETRALOGY OF FALLOT): ICD-10-CM

## 2021-04-08 DIAGNOSIS — Z95.4 S/P TRANSCATHETER REPLACEMENT OF PULMONARY VALVE: ICD-10-CM

## 2021-04-08 DIAGNOSIS — I10 ESSENTIAL HYPERTENSION: ICD-10-CM

## 2021-04-08 DIAGNOSIS — I50.20 HFREF (HEART FAILURE WITH REDUCED EJECTION FRACTION): ICD-10-CM

## 2021-04-08 DIAGNOSIS — I42.0 DILATED CARDIOMYOPATHY: ICD-10-CM

## 2021-04-08 DIAGNOSIS — I35.1 NONRHEUMATIC AORTIC VALVE INSUFFICIENCY: ICD-10-CM

## 2021-04-08 LAB
ASCENDING AORTA: 3.93 CM
AV INDEX (PROSTH): 0.86
AV MEAN GRADIENT: 1 MMHG
AV PEAK GRADIENT: 2 MMHG
AV VALVE AREA: 4.91 CM2
AV VELOCITY RATIO: 0.68
BSA FOR ECHO PROCEDURE: 2.04 M2
CV ECHO LV RWT: 0.46 CM
DOP CALC AO PEAK VEL: 0.76 M/S
DOP CALC AO VTI: 13.2 CM
DOP CALC LVOT AREA: 5.7 CM2
DOP CALC LVOT DIAMETER: 2.7 CM
DOP CALC LVOT PEAK VEL: 0.52 M/S
DOP CALC LVOT STROKE VOLUME: 64.78 CM3
DOP CALC RVOT PEAK VEL: 1.17 M/S
DOP CALC RVOT VTI: 26.67 CM
DOP CALCLVOT PEAK VEL VTI: 11.32 CM
E WAVE DECELERATION TIME: 177.56 MSEC
E/A RATIO: 1.19
E/E' RATIO: 14.73 M/S
ECHO LV POSTERIOR WALL: 1.19 CM (ref 0.6–1.1)
EJECTION FRACTION: 30 %
FRACTIONAL SHORTENING: 26 % (ref 28–44)
INTERVENTRICULAR SEPTUM: 1.24 CM (ref 0.6–1.1)
LA MAJOR: 5.37 CM
LA MINOR: 5.26 CM
LA WIDTH: 3.23 CM
LEFT ATRIUM SIZE: 3.24 CM
LEFT ATRIUM VOLUME INDEX: 23.6 ML/M2
LEFT ATRIUM VOLUME: 47.27 CM3
LEFT INTERNAL DIMENSION IN SYSTOLE: 3.83 CM (ref 2.1–4)
LEFT VENTRICLE DIASTOLIC VOLUME INDEX: 63.89 ML/M2
LEFT VENTRICLE DIASTOLIC VOLUME: 127.78 ML
LEFT VENTRICLE MASS INDEX: 125 G/M2
LEFT VENTRICLE SYSTOLIC VOLUME INDEX: 31.5 ML/M2
LEFT VENTRICLE SYSTOLIC VOLUME: 63.07 ML
LEFT VENTRICULAR INTERNAL DIMENSION IN DIASTOLE: 5.17 CM (ref 3.5–6)
LEFT VENTRICULAR MASS: 250.86 G
LV LATERAL E/E' RATIO: 13.5 M/S
LV SEPTAL E/E' RATIO: 16.2 M/S
MV PEAK A VEL: 0.68 M/S
MV PEAK E VEL: 0.81 M/S
MV STENOSIS PRESSURE HALF TIME: 51.49 MS
MV VALVE AREA P 1/2 METHOD: 4.27 CM2
PISA TR MAX VEL: 2.66 M/S
PV MEAN GRADIENT: 3 MMHG
PV PEAK VELOCITY: 1.61 CM/S
RA MAJOR: 6.65 CM
RA WIDTH: 4.74 CM
RIGHT VENTRICULAR END-DIASTOLIC DIMENSION: 4.43 CM
RV TISSUE DOPPLER FREE WALL SYSTOLIC VELOCITY 1 (APICAL 4 CHAMBER VIEW): 6.41 CM/S
SINUS: 3.97 CM
STJ: 3.57 CM
TDI LATERAL: 0.06 M/S
TDI SEPTAL: 0.05 M/S
TDI: 0.06 M/S
TR MAX PG: 28 MMHG
TRICUSPID ANNULAR PLANE SYSTOLIC EXCURSION: 1.44 CM

## 2021-04-08 PROCEDURE — 93325 DOPPLER ECHO COLOR FLOW MAPG: CPT | Mod: 26,,, | Performed by: PEDIATRICS

## 2021-04-08 PROCEDURE — 99999 PR PBB SHADOW E&M-EST. PATIENT-LVL III: CPT | Mod: PBBFAC,,, | Performed by: PEDIATRICS

## 2021-04-08 PROCEDURE — 93010 ELECTROCARDIOGRAM REPORT: CPT | Mod: S$PBB,,, | Performed by: PEDIATRICS

## 2021-04-08 PROCEDURE — 99204 PR OFFICE/OUTPT VISIT, NEW, LEVL IV, 45-59 MIN: ICD-10-PCS | Mod: 25,S$PBB,, | Performed by: PEDIATRICS

## 2021-04-08 PROCEDURE — 93320 DOPPLER ECHO COMPLETE: CPT | Mod: 26,,, | Performed by: PEDIATRICS

## 2021-04-08 PROCEDURE — 93320 ECHO (CUPID ONLY): ICD-10-PCS | Mod: 26,,, | Performed by: PEDIATRICS

## 2021-04-08 PROCEDURE — 99204 OFFICE O/P NEW MOD 45 MIN: CPT | Mod: 25,S$PBB,, | Performed by: PEDIATRICS

## 2021-04-08 PROCEDURE — 93005 ELECTROCARDIOGRAM TRACING: CPT | Mod: PBBFAC | Performed by: PEDIATRICS

## 2021-04-08 PROCEDURE — 93303 ECHO TRANSTHORACIC: CPT | Mod: 26,,, | Performed by: PEDIATRICS

## 2021-04-08 PROCEDURE — 93303 ECHO (CUPID ONLY): ICD-10-PCS | Mod: 26,,, | Performed by: PEDIATRICS

## 2021-04-08 PROCEDURE — 93325 ECHO (CUPID ONLY): ICD-10-PCS | Mod: 26,,, | Performed by: PEDIATRICS

## 2021-04-08 PROCEDURE — 93320 DOPPLER ECHO COMPLETE: CPT

## 2021-04-08 PROCEDURE — 93010 EKG 12-LEAD: ICD-10-PCS | Mod: S$PBB,,, | Performed by: PEDIATRICS

## 2021-04-08 PROCEDURE — 99999 PR PBB SHADOW E&M-EST. PATIENT-LVL III: ICD-10-PCS | Mod: PBBFAC,,, | Performed by: PEDIATRICS

## 2021-04-08 PROCEDURE — 99213 OFFICE O/P EST LOW 20 MIN: CPT | Mod: PBBFAC,25 | Performed by: PEDIATRICS

## 2021-04-08 RX ORDER — HYDRALAZINE HYDROCHLORIDE 25 MG/1
25 TABLET, FILM COATED ORAL 2 TIMES DAILY
COMMUNITY
Start: 2021-03-22 | End: 2021-04-08 | Stop reason: SDUPTHER

## 2021-04-08 RX ORDER — HYDRALAZINE HYDROCHLORIDE 25 MG/1
25 TABLET, FILM COATED ORAL 2 TIMES DAILY
Qty: 60 TABLET | Refills: 11 | Status: SHIPPED | OUTPATIENT
Start: 2021-04-08 | End: 2022-02-10 | Stop reason: SDUPTHER

## 2021-04-22 ENCOUNTER — OFFICE VISIT (OUTPATIENT)
Dept: CARDIOLOGY | Facility: CLINIC | Age: 31
End: 2021-04-22
Payer: MEDICAID

## 2021-04-22 VITALS
HEART RATE: 74 BPM | OXYGEN SATURATION: 99 % | SYSTOLIC BLOOD PRESSURE: 164 MMHG | WEIGHT: 204.13 LBS | BODY MASS INDEX: 32.04 KG/M2 | DIASTOLIC BLOOD PRESSURE: 109 MMHG | HEIGHT: 67 IN

## 2021-04-22 DIAGNOSIS — Z98.890 S/P RIGHT VENTRICLE TO PULMONARY ARTERY (RV-PA) CONDUIT: ICD-10-CM

## 2021-04-22 DIAGNOSIS — I10 ESSENTIAL HYPERTENSION: ICD-10-CM

## 2021-04-22 DIAGNOSIS — I50.20 HFREF (HEART FAILURE WITH REDUCED EJECTION FRACTION): ICD-10-CM

## 2021-04-22 DIAGNOSIS — Z95.4 S/P TRANSCATHETER REPLACEMENT OF PULMONARY VALVE: ICD-10-CM

## 2021-04-22 DIAGNOSIS — Z87.74 S/P TOF (TETRALOGY OF FALLOT) REPAIR: Primary | Chronic | ICD-10-CM

## 2021-04-22 DIAGNOSIS — I37.0 PULMONARY VALVE STENOSIS, UNSPECIFIED ETIOLOGY: ICD-10-CM

## 2021-04-22 PROCEDURE — 99213 OFFICE O/P EST LOW 20 MIN: CPT | Mod: 25,S$PBB,, | Performed by: PEDIATRICS

## 2021-04-22 PROCEDURE — 99213 PR OFFICE/OUTPT VISIT, EST, LEVL III, 20-29 MIN: ICD-10-PCS | Mod: 25,S$PBB,, | Performed by: PEDIATRICS

## 2021-04-22 PROCEDURE — 99999 PR PBB SHADOW E&M-EST. PATIENT-LVL IV: ICD-10-PCS | Mod: PBBFAC,,, | Performed by: PEDIATRICS

## 2021-04-22 PROCEDURE — 99999 PR PBB SHADOW E&M-EST. PATIENT-LVL IV: CPT | Mod: PBBFAC,,, | Performed by: PEDIATRICS

## 2021-04-22 PROCEDURE — 99214 OFFICE O/P EST MOD 30 MIN: CPT | Mod: PBBFAC | Performed by: PEDIATRICS

## 2021-04-22 RX ORDER — ROSUVASTATIN CALCIUM 5 MG/1
5 TABLET, COATED ORAL DAILY
COMMUNITY
Start: 2021-03-22 | End: 2023-03-23 | Stop reason: SDUPTHER

## 2021-04-22 RX ORDER — AMLODIPINE BESYLATE 10 MG/1
10 TABLET ORAL DAILY
Qty: 30 TABLET | Refills: 11 | Status: SHIPPED | OUTPATIENT
Start: 2021-04-22 | End: 2022-05-11 | Stop reason: SDUPTHER

## 2021-04-22 RX ORDER — AMLODIPINE BESYLATE 10 MG/1
10 TABLET ORAL DAILY
COMMUNITY
Start: 2021-03-22 | End: 2021-04-22

## 2021-04-28 ENCOUNTER — PATIENT MESSAGE (OUTPATIENT)
Dept: RESEARCH | Facility: HOSPITAL | Age: 31
End: 2021-04-28

## 2021-05-20 ENCOUNTER — LAB VISIT (OUTPATIENT)
Dept: LAB | Facility: HOSPITAL | Age: 31
End: 2021-05-20
Payer: MEDICAID

## 2021-05-20 ENCOUNTER — OFFICE VISIT (OUTPATIENT)
Dept: CARDIOLOGY | Facility: CLINIC | Age: 31
End: 2021-05-20
Payer: MEDICAID

## 2021-05-20 VITALS
BODY MASS INDEX: 31.94 KG/M2 | HEIGHT: 67 IN | HEART RATE: 74 BPM | WEIGHT: 203.5 LBS | DIASTOLIC BLOOD PRESSURE: 100 MMHG | OXYGEN SATURATION: 98 % | SYSTOLIC BLOOD PRESSURE: 165 MMHG

## 2021-05-20 DIAGNOSIS — I50.20 HFREF (HEART FAILURE WITH REDUCED EJECTION FRACTION): ICD-10-CM

## 2021-05-20 DIAGNOSIS — Z87.74 S/P TOF (TETRALOGY OF FALLOT) REPAIR: Chronic | ICD-10-CM

## 2021-05-20 DIAGNOSIS — I10 ESSENTIAL HYPERTENSION: ICD-10-CM

## 2021-05-20 DIAGNOSIS — I10 ESSENTIAL HYPERTENSION: Primary | ICD-10-CM

## 2021-05-20 DIAGNOSIS — Z95.4 S/P TRANSCATHETER REPLACEMENT OF PULMONARY VALVE: ICD-10-CM

## 2021-05-20 LAB
ANION GAP SERPL CALC-SCNC: 7 MMOL/L (ref 8–16)
BNP SERPL-MCNC: 48 PG/ML (ref 0–99)
BUN SERPL-MCNC: 11 MG/DL (ref 6–20)
CALCIUM SERPL-MCNC: 10.1 MG/DL (ref 8.7–10.5)
CHLORIDE SERPL-SCNC: 103 MMOL/L (ref 95–110)
CO2 SERPL-SCNC: 29 MMOL/L (ref 23–29)
CREAT SERPL-MCNC: 1 MG/DL (ref 0.5–1.4)
EST. GFR  (AFRICAN AMERICAN): >60 ML/MIN/1.73 M^2
EST. GFR  (NON AFRICAN AMERICAN): >60 ML/MIN/1.73 M^2
GLUCOSE SERPL-MCNC: 80 MG/DL (ref 70–110)
MAGNESIUM SERPL-MCNC: 1.9 MG/DL (ref 1.6–2.6)
POTASSIUM SERPL-SCNC: 4.4 MMOL/L (ref 3.5–5.1)
SODIUM SERPL-SCNC: 139 MMOL/L (ref 136–145)

## 2021-05-20 PROCEDURE — 99999 PR PBB SHADOW E&M-EST. PATIENT-LVL IV: ICD-10-PCS | Mod: PBBFAC,,, | Performed by: PEDIATRICS

## 2021-05-20 PROCEDURE — 99214 OFFICE O/P EST MOD 30 MIN: CPT | Mod: S$PBB,,, | Performed by: PEDIATRICS

## 2021-05-20 PROCEDURE — 83880 ASSAY OF NATRIURETIC PEPTIDE: CPT | Performed by: PEDIATRICS

## 2021-05-20 PROCEDURE — 36415 COLL VENOUS BLD VENIPUNCTURE: CPT | Performed by: PEDIATRICS

## 2021-05-20 PROCEDURE — 83735 ASSAY OF MAGNESIUM: CPT | Performed by: PEDIATRICS

## 2021-05-20 PROCEDURE — 99999 PR PBB SHADOW E&M-EST. PATIENT-LVL IV: CPT | Mod: PBBFAC,,, | Performed by: PEDIATRICS

## 2021-05-20 PROCEDURE — 99214 OFFICE O/P EST MOD 30 MIN: CPT | Mod: PBBFAC | Performed by: PEDIATRICS

## 2021-05-20 PROCEDURE — 99214 PR OFFICE/OUTPT VISIT, EST, LEVL IV, 30-39 MIN: ICD-10-PCS | Mod: S$PBB,,, | Performed by: PEDIATRICS

## 2021-05-20 PROCEDURE — 80048 BASIC METABOLIC PNL TOTAL CA: CPT | Performed by: PEDIATRICS

## 2021-05-20 RX ORDER — SACUBITRIL AND VALSARTAN 49; 51 MG/1; MG/1
1 TABLET, FILM COATED ORAL 2 TIMES DAILY
Qty: 60 TABLET | Refills: 11 | Status: SHIPPED | OUTPATIENT
Start: 2021-05-20 | End: 2021-06-07

## 2021-05-20 RX ORDER — CARVEDILOL 25 MG/1
25 TABLET ORAL 2 TIMES DAILY
Qty: 60 TABLET | Refills: 6 | Status: SHIPPED | OUTPATIENT
Start: 2021-05-20 | End: 2022-05-11 | Stop reason: SDUPTHER

## 2021-05-20 RX ORDER — DIGOXIN 125 MCG
125 TABLET ORAL DAILY
Qty: 30 TABLET | Refills: 11 | Status: SHIPPED | OUTPATIENT
Start: 2021-05-20 | End: 2022-05-11 | Stop reason: SDUPTHER

## 2021-06-07 ENCOUNTER — OFFICE VISIT (OUTPATIENT)
Dept: PEDIATRIC CARDIOLOGY | Facility: CLINIC | Age: 31
End: 2021-06-07
Payer: MEDICAID

## 2021-06-07 VITALS
OXYGEN SATURATION: 99 % | HEIGHT: 66 IN | SYSTOLIC BLOOD PRESSURE: 159 MMHG | DIASTOLIC BLOOD PRESSURE: 100 MMHG | WEIGHT: 208.25 LBS | BODY MASS INDEX: 33.47 KG/M2 | HEART RATE: 81 BPM

## 2021-06-07 DIAGNOSIS — Q24.9 ADULT CONGENITAL HEART DISEASE: ICD-10-CM

## 2021-06-07 DIAGNOSIS — I10 ESSENTIAL HYPERTENSION: ICD-10-CM

## 2021-06-07 DIAGNOSIS — I50.20 HFREF (HEART FAILURE WITH REDUCED EJECTION FRACTION): ICD-10-CM

## 2021-06-07 DIAGNOSIS — Z95.4 S/P TRANSCATHETER REPLACEMENT OF PULMONARY VALVE: Primary | ICD-10-CM

## 2021-06-07 PROCEDURE — 99213 PR OFFICE/OUTPT VISIT, EST, LEVL III, 20-29 MIN: ICD-10-PCS | Mod: S$PBB,,, | Performed by: PEDIATRICS

## 2021-06-07 PROCEDURE — 99213 OFFICE O/P EST LOW 20 MIN: CPT | Mod: S$PBB,,, | Performed by: PEDIATRICS

## 2021-06-07 PROCEDURE — 99999 PR PBB SHADOW E&M-EST. PATIENT-LVL III: CPT | Mod: PBBFAC,,, | Performed by: PEDIATRICS

## 2021-06-07 PROCEDURE — 99999 PR PBB SHADOW E&M-EST. PATIENT-LVL III: ICD-10-PCS | Mod: PBBFAC,,, | Performed by: PEDIATRICS

## 2021-06-07 PROCEDURE — 99213 OFFICE O/P EST LOW 20 MIN: CPT | Mod: PBBFAC | Performed by: PEDIATRICS

## 2021-07-08 ENCOUNTER — OFFICE VISIT (OUTPATIENT)
Dept: CARDIOLOGY | Facility: CLINIC | Age: 31
End: 2021-07-08
Payer: MEDICAID

## 2021-07-08 VITALS
DIASTOLIC BLOOD PRESSURE: 89 MMHG | HEIGHT: 66 IN | WEIGHT: 214.06 LBS | BODY MASS INDEX: 34.4 KG/M2 | OXYGEN SATURATION: 97 % | HEART RATE: 85 BPM | SYSTOLIC BLOOD PRESSURE: 143 MMHG

## 2021-07-08 DIAGNOSIS — Q24.9 ADULT CONGENITAL HEART DISEASE: Primary | ICD-10-CM

## 2021-07-08 DIAGNOSIS — I50.20 NYHA CLASS 2 HEART FAILURE WITH REDUCED EJECTION FRACTION: ICD-10-CM

## 2021-07-08 DIAGNOSIS — Z87.74 S/P TOF (TETRALOGY OF FALLOT) REPAIR: Chronic | ICD-10-CM

## 2021-07-08 DIAGNOSIS — I10 ESSENTIAL HYPERTENSION: ICD-10-CM

## 2021-07-08 PROCEDURE — 99999 PR PBB SHADOW E&M-EST. PATIENT-LVL III: CPT | Mod: PBBFAC,,, | Performed by: PEDIATRICS

## 2021-07-08 PROCEDURE — 99213 OFFICE O/P EST LOW 20 MIN: CPT | Mod: PBBFAC | Performed by: PEDIATRICS

## 2021-07-08 PROCEDURE — 99999 PR PBB SHADOW E&M-EST. PATIENT-LVL III: ICD-10-PCS | Mod: PBBFAC,,, | Performed by: PEDIATRICS

## 2021-07-08 PROCEDURE — 99212 OFFICE O/P EST SF 10 MIN: CPT | Mod: S$PBB,,, | Performed by: PEDIATRICS

## 2021-07-08 PROCEDURE — 99212 PR OFFICE/OUTPT VISIT, EST, LEVL II, 10-19 MIN: ICD-10-PCS | Mod: S$PBB,,, | Performed by: PEDIATRICS

## 2021-07-31 ENCOUNTER — PATIENT MESSAGE (OUTPATIENT)
Dept: CARDIOLOGY | Facility: CLINIC | Age: 31
End: 2021-07-31

## 2021-09-01 ENCOUNTER — PATIENT MESSAGE (OUTPATIENT)
Dept: PEDIATRIC CARDIOLOGY | Facility: CLINIC | Age: 31
End: 2021-09-01

## 2021-09-01 DIAGNOSIS — I42.0 DILATED CARDIOMYOPATHY: ICD-10-CM

## 2021-09-01 DIAGNOSIS — I10 ESSENTIAL HYPERTENSION: ICD-10-CM

## 2021-09-01 DIAGNOSIS — I51.7 RIGHT VENTRICULAR DILATION: ICD-10-CM

## 2021-09-01 DIAGNOSIS — Z87.74 S/P TOF (TETRALOGY OF FALLOT) REPAIR: Primary | ICD-10-CM

## 2021-10-13 ENCOUNTER — TELEPHONE (OUTPATIENT)
Dept: RESEARCH | Facility: HOSPITAL | Age: 31
End: 2021-10-13

## 2021-10-14 ENCOUNTER — OFFICE VISIT (OUTPATIENT)
Dept: CARDIOLOGY | Facility: CLINIC | Age: 31
End: 2021-10-14
Payer: MEDICAID

## 2021-10-14 ENCOUNTER — HOSPITAL ENCOUNTER (OUTPATIENT)
Dept: CARDIOLOGY | Facility: CLINIC | Age: 31
Discharge: HOME | End: 2021-10-14
Payer: MEDICAID

## 2021-10-14 ENCOUNTER — RESEARCH ENCOUNTER (OUTPATIENT)
Dept: RESEARCH | Facility: HOSPITAL | Age: 31
End: 2021-10-14

## 2021-10-14 ENCOUNTER — HOSPITAL ENCOUNTER (OUTPATIENT)
Dept: CARDIOLOGY | Facility: HOSPITAL | Age: 31
Discharge: HOME | End: 2021-10-14
Attending: PEDIATRICS
Payer: MEDICAID

## 2021-10-14 VITALS
DIASTOLIC BLOOD PRESSURE: 104 MMHG | HEART RATE: 83 BPM | SYSTOLIC BLOOD PRESSURE: 166 MMHG | HEIGHT: 66 IN | OXYGEN SATURATION: 100 % | BODY MASS INDEX: 34.39 KG/M2 | WEIGHT: 218.06 LBS | WEIGHT: 214 LBS | HEART RATE: 68 BPM | BODY MASS INDEX: 35.04 KG/M2 | HEIGHT: 66 IN

## 2021-10-14 DIAGNOSIS — Z87.74 S/P TOF (TETRALOGY OF FALLOT) REPAIR: ICD-10-CM

## 2021-10-14 DIAGNOSIS — I10 ESSENTIAL HYPERTENSION: ICD-10-CM

## 2021-10-14 DIAGNOSIS — Q25.47 RIGHT AORTIC ARCH: ICD-10-CM

## 2021-10-14 DIAGNOSIS — I42.0 DILATED CARDIOMYOPATHY: ICD-10-CM

## 2021-10-14 DIAGNOSIS — I51.7 RIGHT VENTRICULAR DILATION: ICD-10-CM

## 2021-10-14 DIAGNOSIS — Z87.74 S/P TOF (TETRALOGY OF FALLOT) REPAIR: Primary | Chronic | ICD-10-CM

## 2021-10-14 DIAGNOSIS — I50.20 HFREF (HEART FAILURE WITH REDUCED EJECTION FRACTION): ICD-10-CM

## 2021-10-14 DIAGNOSIS — Q24.9 ADULT CONGENITAL HEART DISEASE: ICD-10-CM

## 2021-10-14 DIAGNOSIS — I35.1 NONRHEUMATIC AORTIC VALVE INSUFFICIENCY: ICD-10-CM

## 2021-10-14 LAB
ASCENDING AORTA: 3.67 CM
AV INDEX (PROSTH): 0.72
AV MEAN GRADIENT: 2 MMHG
AV PEAK GRADIENT: 4 MMHG
AV VALVE AREA: 4.18 CM2
AV VELOCITY RATIO: 0.64
BSA FOR ECHO PROCEDURE: 2.13 M2
CV ECHO LV RWT: 0.43 CM
DOP CALC AO PEAK VEL: 0.98 M/S
DOP CALC AO VTI: 17.27 CM
DOP CALC LVOT AREA: 5.8 CM2
DOP CALC LVOT DIAMETER: 2.71 CM
DOP CALC LVOT PEAK VEL: 0.63 M/S
DOP CALC LVOT STROKE VOLUME: 72.12 CM3
DOP CALC RVOT PEAK VEL: 0.98 M/S
DOP CALC RVOT VTI: 21.8 CM
DOP CALCLVOT PEAK VEL VTI: 12.51 CM
E WAVE DECELERATION TIME: 115.8 MSEC
E/A RATIO: 1.39
E/E' RATIO: 14.33 M/S
ECHO LV POSTERIOR WALL: 1.05 CM (ref 0.6–1.1)
FRACTIONAL SHORTENING: 14 % (ref 28–44)
INTERVENTRICULAR SEPTUM: 1.05 CM (ref 0.6–1.1)
LA MAJOR: 5.58 CM
LA WIDTH: 3.02 CM
LEFT ATRIUM SIZE: 3.23 CM
LEFT INTERNAL DIMENSION IN SYSTOLE: 4.24 CM (ref 2.1–4)
LEFT VENTRICLE DIASTOLIC VOLUME INDEX: 55.84 ML/M2
LEFT VENTRICLE DIASTOLIC VOLUME: 115.03 ML
LEFT VENTRICLE MASS INDEX: 93 G/M2
LEFT VENTRICLE SYSTOLIC VOLUME INDEX: 39.1 ML/M2
LEFT VENTRICLE SYSTOLIC VOLUME: 80.48 ML
LEFT VENTRICULAR INTERNAL DIMENSION IN DIASTOLE: 4.94 CM (ref 3.5–6)
LEFT VENTRICULAR MASS: 190.6 G
LV LATERAL E/E' RATIO: 12.29 M/S
LV SEPTAL E/E' RATIO: 17.2 M/S
MV PEAK A VEL: 0.62 M/S
MV PEAK E VEL: 0.86 M/S
MV STENOSIS PRESSURE HALF TIME: 33.58 MS
MV VALVE AREA P 1/2 METHOD: 6.55 CM2
PISA TR MAX VEL: 2.87 M/S
PV MEAN GRADIENT: 3 MMHG
PV PEAK VELOCITY: 2.01 CM/S
RA MAJOR: 6.15 CM
RA WIDTH: 4.29 CM
RIGHT VENTRICULAR END-DIASTOLIC DIMENSION: 4.8 CM
RV TISSUE DOPPLER FREE WALL SYSTOLIC VELOCITY 1 (APICAL 4 CHAMBER VIEW): 8.99 CM/S
SINUS: 4.03 CM
STJ: 3.23 CM
TDI LATERAL: 0.07 M/S
TDI SEPTAL: 0.05 M/S
TDI: 0.06 M/S
TR MAX PG: 33 MMHG
TRICUSPID ANNULAR PLANE SYSTOLIC EXCURSION: 1.66 CM

## 2021-10-14 PROCEDURE — 99213 OFFICE O/P EST LOW 20 MIN: CPT | Mod: S$PBB,,, | Performed by: PEDIATRICS

## 2021-10-14 PROCEDURE — 99999 PR PBB SHADOW E&M-EST. PATIENT-LVL III: ICD-10-PCS | Mod: PBBFAC,,, | Performed by: PEDIATRICS

## 2021-10-14 PROCEDURE — 93325 DOPPLER ECHO COLOR FLOW MAPG: CPT | Mod: 26,,, | Performed by: PEDIATRICS

## 2021-10-14 PROCEDURE — 93303 ECHO (CUPID ONLY): ICD-10-PCS | Mod: 26,,, | Performed by: PEDIATRICS

## 2021-10-14 PROCEDURE — 99213 OFFICE O/P EST LOW 20 MIN: CPT | Mod: PBBFAC,25 | Performed by: PEDIATRICS

## 2021-10-14 PROCEDURE — 93303 ECHO TRANSTHORACIC: CPT

## 2021-10-14 PROCEDURE — 93320 ECHO (CUPID ONLY): ICD-10-PCS | Mod: 26,,, | Performed by: PEDIATRICS

## 2021-10-14 PROCEDURE — 93010 ELECTROCARDIOGRAM REPORT: CPT | Mod: S$PBB,,, | Performed by: INTERNAL MEDICINE

## 2021-10-14 PROCEDURE — 93320 DOPPLER ECHO COMPLETE: CPT | Mod: 26,,, | Performed by: PEDIATRICS

## 2021-10-14 PROCEDURE — 99213 PR OFFICE/OUTPT VISIT, EST, LEVL III, 20-29 MIN: ICD-10-PCS | Mod: S$PBB,,, | Performed by: PEDIATRICS

## 2021-10-14 PROCEDURE — 93325 ECHO (CUPID ONLY): ICD-10-PCS | Mod: 26,,, | Performed by: PEDIATRICS

## 2021-10-14 PROCEDURE — 93010 EKG 12-LEAD: ICD-10-PCS | Mod: S$PBB,,, | Performed by: INTERNAL MEDICINE

## 2021-10-14 PROCEDURE — 99999 PR PBB SHADOW E&M-EST. PATIENT-LVL III: CPT | Mod: PBBFAC,,, | Performed by: PEDIATRICS

## 2021-10-14 PROCEDURE — 93005 ELECTROCARDIOGRAM TRACING: CPT | Mod: PBBFAC | Performed by: INTERNAL MEDICINE

## 2021-10-14 PROCEDURE — 93303 ECHO TRANSTHORACIC: CPT | Mod: 26,,, | Performed by: PEDIATRICS

## 2022-01-12 ENCOUNTER — PATIENT MESSAGE (OUTPATIENT)
Dept: CARDIOLOGY | Facility: CLINIC | Age: 32
End: 2022-01-12
Payer: MEDICAID

## 2022-02-10 ENCOUNTER — OFFICE VISIT (OUTPATIENT)
Dept: CARDIOLOGY | Facility: CLINIC | Age: 32
End: 2022-02-10
Payer: MEDICAID

## 2022-02-10 VITALS
WEIGHT: 209.19 LBS | OXYGEN SATURATION: 98 % | DIASTOLIC BLOOD PRESSURE: 105 MMHG | BODY MASS INDEX: 33.62 KG/M2 | HEIGHT: 66 IN | HEART RATE: 84 BPM | SYSTOLIC BLOOD PRESSURE: 160 MMHG

## 2022-02-10 DIAGNOSIS — Z98.890 S/P RIGHT VENTRICLE TO PULMONARY ARTERY (RV-PA) CONDUIT: ICD-10-CM

## 2022-02-10 DIAGNOSIS — I42.0 DILATED CARDIOMYOPATHY: ICD-10-CM

## 2022-02-10 DIAGNOSIS — Q21.3 TETRALOGY OF FALLOT: Primary | ICD-10-CM

## 2022-02-10 DIAGNOSIS — Z87.74 S/P TOF (TETRALOGY OF FALLOT) REPAIR: Chronic | ICD-10-CM

## 2022-02-10 DIAGNOSIS — Z95.4 S/P TRANSCATHETER REPLACEMENT OF PULMONARY VALVE: ICD-10-CM

## 2022-02-10 DIAGNOSIS — I10 PRIMARY HYPERTENSION: ICD-10-CM

## 2022-02-10 PROCEDURE — 3077F PR MOST RECENT SYSTOLIC BLOOD PRESSURE >= 140 MM HG: ICD-10-PCS | Mod: CPTII,,, | Performed by: PEDIATRICS

## 2022-02-10 PROCEDURE — 99999 PR PBB SHADOW E&M-EST. PATIENT-LVL IV: ICD-10-PCS | Mod: PBBFAC,,, | Performed by: PEDIATRICS

## 2022-02-10 PROCEDURE — 3080F PR MOST RECENT DIASTOLIC BLOOD PRESSURE >= 90 MM HG: ICD-10-PCS | Mod: CPTII,,, | Performed by: PEDIATRICS

## 2022-02-10 PROCEDURE — 99213 OFFICE O/P EST LOW 20 MIN: CPT | Mod: S$PBB,,, | Performed by: PEDIATRICS

## 2022-02-10 PROCEDURE — 3077F SYST BP >= 140 MM HG: CPT | Mod: CPTII,,, | Performed by: PEDIATRICS

## 2022-02-10 PROCEDURE — 99214 OFFICE O/P EST MOD 30 MIN: CPT | Mod: PBBFAC | Performed by: PEDIATRICS

## 2022-02-10 PROCEDURE — 3008F PR BODY MASS INDEX (BMI) DOCUMENTED: ICD-10-PCS | Mod: CPTII,,, | Performed by: PEDIATRICS

## 2022-02-10 PROCEDURE — 3008F BODY MASS INDEX DOCD: CPT | Mod: CPTII,,, | Performed by: PEDIATRICS

## 2022-02-10 PROCEDURE — 99213 PR OFFICE/OUTPT VISIT, EST, LEVL III, 20-29 MIN: ICD-10-PCS | Mod: S$PBB,,, | Performed by: PEDIATRICS

## 2022-02-10 PROCEDURE — 99999 PR PBB SHADOW E&M-EST. PATIENT-LVL IV: CPT | Mod: PBBFAC,,, | Performed by: PEDIATRICS

## 2022-02-10 PROCEDURE — 1159F PR MEDICATION LIST DOCUMENTED IN MEDICAL RECORD: ICD-10-PCS | Mod: CPTII,,, | Performed by: PEDIATRICS

## 2022-02-10 PROCEDURE — 3080F DIAST BP >= 90 MM HG: CPT | Mod: CPTII,,, | Performed by: PEDIATRICS

## 2022-02-10 PROCEDURE — 1159F MED LIST DOCD IN RCRD: CPT | Mod: CPTII,,, | Performed by: PEDIATRICS

## 2022-02-10 RX ORDER — IBUPROFEN 800 MG/1
800 TABLET ORAL EVERY 6 HOURS PRN
COMMUNITY
Start: 2022-01-21

## 2022-02-10 RX ORDER — HYDROCODONE BITARTRATE AND ACETAMINOPHEN 5; 325 MG/1; MG/1
1 TABLET ORAL EVERY 6 HOURS PRN
COMMUNITY
Start: 2022-01-12 | End: 2023-03-23

## 2022-02-10 RX ORDER — HYDRALAZINE HYDROCHLORIDE 50 MG/1
50 TABLET, FILM COATED ORAL 3 TIMES DAILY
Qty: 90 TABLET | Refills: 11 | Status: SHIPPED | OUTPATIENT
Start: 2022-02-10 | End: 2022-09-05 | Stop reason: SDUPTHER

## 2022-02-10 NOTE — PROGRESS NOTES
02/10/2022    Re:Thomas Horvath  :1990     Sissy Lacy MD  [address]  [facility] [place] [address]    [facility] Note    Thomas Horvath is a 31 y.o. male with the following diagnoses:  1. 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   - epicardial left ventricular pacing wires in place.   3. Severe homograft obstruction - now s/p RVOT stents and Anne valve 7/23/15 with excellent result  4. Mild pulmonary artery hypertension noted in the past.   5. Trace aortic insufficiency and mild aortic root enlargement  6. Long history of decreased LV function - possibly exacerbated by high afterload.   7. Hypertension - poor compliance in the past with medications, improved.  Still with significant hypertension.  Strong family history of hypertension.       Recommendations:  1.  Continue current medications, increase hydralazine to 50mg TID  2.  Continue off aldactone and lisinopril  3.  Follow-up 3 months with echo, ekg, labs (BMP, BNP)  4.  SBE prophylaxis is definitely indicated.  Regular dental care strongly recommended.  5.  Any worsening shortness of breath, significant palpitations, syncope, chest pain, neurologic changes, or worsening headache or other concerning symptoms should prompt emergency room visit.  6.  Once again stressed need for good compliance with medications.    Discussion:    1.  From a congenital heart standpoint he looks great.  The pulmonary valve is working extremely well, and his right ventricle looks well.  2.  He reports better compliance with his medication although I still have my doubts.  His blood pressure is still significantly elevated.  I went up on the dose and timing interval of his hydralazine.  I do have concerns about a 3 times a day medication given difficulties with compliance, but we will give it a shot.  3.  I will see him  again with an echo in 3 months.  If despite better compliance his LV function remains decreased, I will refer him to the heart failure service.  We would also consider whether an ICD was indicated.    Interval history:  Overall, he has done well since I last saw him in clinic.  No chest pain or palpitations.  No syncope or near-syncope.  No edema.  No worsening shortness of breath.  When I saw him last, he admitted to poor compliance with his medication.  He tells me he has been taking the medications as directed recently.    Recent past medical history:    I saw him in clinic on April 8, 2021 after not seeing him for 4 years.  He had been in shelter.  In my clinic, he was significantly hypertensive.  While he was in shelter, it looks like he had significant hypertension.  In addition to his normal lisinopril and carvedilol, he was placed on hydralazine and amlodipine.  He was also on a statin.  When he got out of shelter 2 months prior to that visit with, he went back to only taking his original medications including the carvedilol and the lisinopril.  Had not been taking the amlodipine or the hydralazine.  Despite restarting his hydralazine and his amlodipine, he continued to be quite hypertensive.  I discussed his case with Dr. Arrington, who recommend transitioning to Entresto given the decreased left ventricular function.    The review of systems is as noted above. It is otherwise negative for other symptoms related to the general, neurological, psychiatric, endocrine, gastrointestinal, genitourinary, respiratory, dermatologic, musculoskeletal, hematologic, and immunologic systems.    Past Medical History:   Diagnosis Date    Aortic insufficiency     trace    Homograft cardiac valve stenosis     mild    Hypertension     Left ventricular dysfunction     Pulmonary artery hypertension     RV hypertension    Right ventricular dilation     S/P TOF (tetralogy of Fallot) repair 12/18/2014    TOF (tetralogy of  Fallot)      Past Surgical History:   Procedure Laterality Date    balloon dilation of pulmonary atrery homograft      [place] [facility] [place]    epicardial pacing wires      LV    pulmonary valve replacement  2006    TETRALOGY OF FALLOT REPAIR       Family History   Problem Relation Age of Onset    Hypertension Mother     No Known Problems Father     No Known Problems Sister     No Known Problems Brother     No Known Problems Maternal Grandmother     No Known Problems Maternal Grandfather     No Known Problems Paternal Grandmother     No Known Problems Paternal Grandfather     No Known Problems Maternal Aunt     No Known Problems Maternal Uncle     No Known Problems Paternal Aunt     No Known Problems Paternal Uncle     Anemia Neg Hx     Arrhythmia Neg Hx     Asthma Neg Hx     Clotting disorder Neg Hx     Fainting Neg Hx     Heart attack Neg Hx     Heart disease Neg Hx     Heart failure Neg Hx     Hyperlipidemia Neg Hx     Stroke Neg Hx     Atrial Septal Defect Neg Hx      Social History     Socioeconomic History    Marital status: Single    Years of education: 10   Tobacco Use    Smoking status: Former Smoker     Start date: 2006     Quit date: 2015     Years since quittin.6    Smokeless tobacco: Never Used    Tobacco comment: marijuana   Substance and Sexual Activity    Alcohol use: No     Alcohol/week: 0.0 standard drinks    Drug use: Yes     Frequency: 7.0 times per week     Types: Marijuana    Sexual activity: Yes     Partners: Female   Social History Narrative    Lives at home with mother at this time.  He is uninsured and not working.     Current Outpatient Medications on File Prior to Visit   Medication Sig Dispense Refill    amLODIPine (NORVASC) 10 MG tablet Take 1 tablet (10 mg total) by mouth once daily. 30 tablet 11    aspirin 81 MG Chew Take 1 tablet (81 mg total) by mouth once daily. 30 tablet 11    carvediloL (COREG)  "25 MG tablet Take 1 tablet (25 mg total) by mouth 2 (two) times daily. 60 tablet 6    digoxin (LANOXIN) 125 mcg tablet Take 1 tablet (125 mcg total) by mouth once daily. 30 tablet 11    hydrALAZINE (APRESOLINE) 25 MG tablet Take 1 tablet (25 mg total) by mouth 2 (two) times daily. 60 tablet 11    HYDROcodone-acetaminophen (NORCO) 5-325 mg per tablet Take 1 tablet by mouth every 6 (six) hours as needed.      ibuprofen (ADVIL,MOTRIN) 800 MG tablet Take 800 mg by mouth every 6 (six) hours as needed.      rosuvastatin (CRESTOR) 5 MG tablet Take 5 mg by mouth once daily.      sacubitriL-valsartan (ENTRESTO)  mg per tablet Take 1 tablet by mouth 2 (two) times daily. 60 tablet 11     No current facility-administered medications on file prior to visit.       No Known Allergies    BP (!) 160/105 (BP Location: Left arm, Patient Position: Sitting, BP Method: Large (Automatic))   Pulse 84   Ht 5' 6" (1.676 m)   Wt 94.9 kg (209 lb 3.5 oz)   SpO2 98%   BMI 33.77 kg/m²      In general, he is an obese but otherwise healthy appearing, nondysmorphic-appearing, black male in no apparent distress. The eyes, nares, and oropharynx are clear.  The neck is supple without thyroid enlargement.  There is very mild jugular venous distention.  The lungs are clear to auscultation bilaterally. There is no wheezing.  There are no rales.  A well-healed median sternotomy incision is noted.  The first heart sound is normal.  A grade 1/6 systolic ejection murmur is best auscultated at the upper left sternal border.  There are no diastolic murmurs or gallops.  The second heart sound is fixed and split.  The abdominal exam is benign without hepatosplenomegaly, tenderness, or distention.  Pulses are normal in all extremities except the right hand where it is mildly decreased.  There is no clubbing, cyanosis, or edema.  No rashes are noted.  He does have a tattoo on the right arm.    I personally reviewed the following tests performed " today and my interpretation follows:    No tests in clinic today.    Results for orders placed during the hospital encounter of 10/14/21    Echo    IMPRESSION:  Overall no significant changes from previous study-  Qualitative impression of moderately dilated right atrium  Dilated tricuspid annulus with poor apposition of tricuspid leaflets and mild to moderate insufficiency  Mild right ventricle hypertrophy with moderate dilation.  Qualitative impression mildly reduced systolic function of the inlet and apical segments of the right ventricle with large outflow tract patch.  Right ventricle systolic pressure estimate >33 mmHg plus right atrial pressure based on well-developed TR Doppler profile.  Echodensity consistent with patch repair of ventricular septal defect and malalignment of the ventricular septum with no residual shunt demonstrated.  Images consistent with Anne implant in pulmonary homograft with peak velocity <2.3 m/sec and mean <13 mm Hg.  No significant pulmonary insufficiency.  Right pulmonary artery normal diameter with laminar flow to first order branching.  Proximal left pulmonary artery appears normal in size.  Left atrium appears normal in size but compressed by RA dilation.  Trivial mitral valve insufficiency.  There is at least mild concentric left ventricular hypertrophy.  Paradoxical septal motion decreased movement of the LV free wall, SF = 23% and EF estimated 30 -35% from apical views (unchanged from previous study).  Grade I left ventricular diastolic dysfunction.  Trivial aortic valve insufficiency.  Aortic dimensions:  Sinuses of Valsalva = 40 mm.  ST junction             = 32 mm.  Ascending aorta     = 36 mm.  Normal aortic arch with no evidence of coarctation.  No pericardial effusion.    Lab Results   Component Value Date    WBC 7.13 10/14/2021    HGB 15.6 10/14/2021    HCT 49.7 10/14/2021    MCV 88 10/14/2021     10/14/2021     CMP  Sodium   Date Value Ref Range Status    10/14/2021 141 136 - 145 mmol/L Final     Potassium   Date Value Ref Range Status   10/14/2021 4.0 3.5 - 5.1 mmol/L Final     Chloride   Date Value Ref Range Status   10/14/2021 104 95 - 110 mmol/L Final     CO2   Date Value Ref Range Status   10/14/2021 29 23 - 29 mmol/L Final     Glucose   Date Value Ref Range Status   10/14/2021 58 (L) 70 - 110 mg/dL Final     BUN   Date Value Ref Range Status   10/14/2021 13 6 - 20 mg/dL Final     Creatinine   Date Value Ref Range Status   10/14/2021 1.3 0.5 - 1.4 mg/dL Final     Calcium   Date Value Ref Range Status   10/14/2021 10.0 8.7 - 10.5 mg/dL Final     Total Protein   Date Value Ref Range Status   10/14/2021 7.7 6.0 - 8.4 g/dL Final     Albumin   Date Value Ref Range Status   10/14/2021 4.1 3.5 - 5.2 g/dL Final     Total Bilirubin   Date Value Ref Range Status   10/14/2021 0.6 0.1 - 1.0 mg/dL Final     Comment:     For infants and newborns, interpretation of results should be based  on gestational age, weight and in agreement with clinical  observations.    Premature Infant recommended reference ranges:  Up to 24 hours.............<8.0 mg/dL  Up to 48 hours............<12.0 mg/dL  3-5 days..................<15.0 mg/dL  6-29 days.................<15.0 mg/dL       Alkaline Phosphatase   Date Value Ref Range Status   10/14/2021 87 55 - 135 U/L Final     AST   Date Value Ref Range Status   10/14/2021 19 10 - 40 U/L Final     ALT   Date Value Ref Range Status   10/14/2021 14 10 - 44 U/L Final     Anion Gap   Date Value Ref Range Status   10/14/2021 8 8 - 16 mmol/L Final     eGFR if    Date Value Ref Range Status   10/14/2021 >60.0 >60 mL/min/1.73 m^2 Final     eGFR if non    Date Value Ref Range Status   10/14/2021 >60.0 >60 mL/min/1.73 m^2 Final     Comment:     Calculation used to obtain the estimated glomerular filtration  rate (eGFR) is the CKD-EPI equation.        Results for DON PALOMO (MRN [id number]) as of 10/14/2021 17:42   Ref.  Range 4/8/2021 16:19 5/20/2021 11:28 10/14/2021 13:53 10/14/2021 14:34 10/14/2021 16:13   BNP Latest Ref Range: 0 - 99 pg/mL 57 48   111 (H)     Cath 7/23/15:  IMPRESSION:  1. Repaired tetralogy of Fallot with severe homograft stenosis, peak gradient 40 mmHg.  2. Right and left ventricular dysfunction and low cardiac output, normal pulmonary vascular resistance calculations.  3. Right aortic arch.  4. Right coronary artery from left sinus of Valsalva.  5. RV to PA conduit dilation with Juliaetta 16, 18, 20 and 22 mm balloons.  6. RV to pulmonary artery conduit stent with Palmaz [id number] on 22 x 4 BIB (x3), postdilated with Juliaetta 22 mm balloon (16 atmospheres).  7. Anne valve implantation on 22-Mosotho Ensemble, residual gradient 9 mmHg, trivial insufficiency.    Sincerely,        Ino Corona MD  Pediatric Cardiology  Adult Congenital Heart Disease  Pediatric Heart Failure and Transplantation  [facility] [address] [place]  [address]  [phone]

## 2022-02-21 ENCOUNTER — PATIENT MESSAGE (OUTPATIENT)
Dept: CARDIOLOGY | Facility: CLINIC | Age: 32
End: 2022-02-21
Payer: MEDICAID

## 2022-03-24 ENCOUNTER — PATIENT MESSAGE (OUTPATIENT)
Dept: PEDIATRIC CARDIOLOGY | Facility: CLINIC | Age: 32
End: 2022-03-24
Payer: MEDICAID

## 2022-07-07 ENCOUNTER — HOSPITAL ENCOUNTER (OUTPATIENT)
Dept: CARDIOLOGY | Facility: CLINIC | Age: 32
Discharge: HOME | End: 2022-07-07
Payer: MEDICAID

## 2022-07-07 ENCOUNTER — OFFICE VISIT (OUTPATIENT)
Dept: CARDIOLOGY | Facility: CLINIC | Age: 32
End: 2022-07-07
Payer: MEDICAID

## 2022-07-07 ENCOUNTER — HOSPITAL ENCOUNTER (OUTPATIENT)
Dept: CARDIOLOGY | Facility: HOSPITAL | Age: 32
Discharge: HOME | End: 2022-07-07
Attending: PEDIATRICS
Payer: MEDICAID

## 2022-07-07 VITALS
HEART RATE: 77 BPM | BODY MASS INDEX: 31.22 KG/M2 | HEIGHT: 66 IN | OXYGEN SATURATION: 97 % | DIASTOLIC BLOOD PRESSURE: 84 MMHG | WEIGHT: 194.25 LBS | SYSTOLIC BLOOD PRESSURE: 140 MMHG

## 2022-07-07 VITALS — BODY MASS INDEX: 33.59 KG/M2 | HEIGHT: 66 IN | WEIGHT: 209 LBS

## 2022-07-07 DIAGNOSIS — Q24.9 ADULT CONGENITAL HEART DISEASE: Primary | ICD-10-CM

## 2022-07-07 DIAGNOSIS — Q21.3 TETRALOGY OF FALLOT: ICD-10-CM

## 2022-07-07 DIAGNOSIS — I50.42 CHRONIC COMBINED SYSTOLIC AND DIASTOLIC HEART FAILURE: ICD-10-CM

## 2022-07-07 DIAGNOSIS — I10 PRIMARY HYPERTENSION: ICD-10-CM

## 2022-07-07 DIAGNOSIS — I42.0 DILATED CARDIOMYOPATHY: ICD-10-CM

## 2022-07-07 DIAGNOSIS — Z87.74 S/P TOF (TETRALOGY OF FALLOT) REPAIR: Chronic | ICD-10-CM

## 2022-07-07 DIAGNOSIS — I35.1 NONRHEUMATIC AORTIC VALVE INSUFFICIENCY: ICD-10-CM

## 2022-07-07 DIAGNOSIS — I10 ESSENTIAL HYPERTENSION: ICD-10-CM

## 2022-07-07 DIAGNOSIS — Z95.4 S/P TRANSCATHETER REPLACEMENT OF PULMONARY VALVE: ICD-10-CM

## 2022-07-07 DIAGNOSIS — I50.20 HFREF (HEART FAILURE WITH REDUCED EJECTION FRACTION): ICD-10-CM

## 2022-07-07 DIAGNOSIS — Z98.890 S/P RIGHT VENTRICLE TO PULMONARY ARTERY (RV-PA) CONDUIT: ICD-10-CM

## 2022-07-07 PROBLEM — E87.5 HYPERKALEMIA: Status: RESOLVED | Noted: 2019-06-27 | Resolved: 2022-07-07

## 2022-07-07 PROBLEM — E86.9 VOLUME DEPLETION: Status: RESOLVED | Noted: 2019-06-27 | Resolved: 2022-07-07

## 2022-07-07 PROBLEM — N17.9 AKI (ACUTE KIDNEY INJURY): Status: RESOLVED | Noted: 2019-06-27 | Resolved: 2022-07-07

## 2022-07-07 LAB
ASCENDING AORTA: 3.46 CM
AV INDEX (PROSTH): 0.78
AV MEAN GRADIENT: 2 MMHG
AV PEAK GRADIENT: 3 MMHG
AV VALVE AREA: 3.03 CM2
AV VELOCITY RATIO: 0.62
BSA FOR ECHO PROCEDURE: 2.1 M2
CV ECHO LV RWT: 0.53 CM
DOP CALC AO PEAK VEL: 0.81 M/S
DOP CALC AO VTI: 12.57 CM
DOP CALC LVOT AREA: 3.9 CM2
DOP CALC LVOT DIAMETER: 2.22 CM
DOP CALC LVOT PEAK VEL: 0.5 M/S
DOP CALC LVOT STROKE VOLUME: 38.15 CM3
DOP CALC RVOT PEAK VEL: 1.05 M/S
DOP CALC RVOT VTI: 24.78 CM
DOP CALCLVOT PEAK VEL VTI: 9.86 CM
E WAVE DECELERATION TIME: 186.74 MSEC
E/A RATIO: 1.48
E/E' RATIO: 9.83 M/S
ECHO LV POSTERIOR WALL: 1.27 CM (ref 0.6–1.1)
EJECTION FRACTION: 35 %
FRACTIONAL SHORTENING: 33 % (ref 28–44)
INTERVENTRICULAR SEPTUM: 1.13 CM (ref 0.6–1.1)
LA MAJOR: 5.16 CM
LA MINOR: 4.79 CM
LA WIDTH: 3.6 CM
LEFT ATRIUM SIZE: 3.22 CM
LEFT ATRIUM VOLUME INDEX MOD: 24.8 ML/M2
LEFT ATRIUM VOLUME INDEX: 24 ML/M2
LEFT ATRIUM VOLUME MOD: 50.64 CM3
LEFT ATRIUM VOLUME: 48.95 CM3
LEFT INTERNAL DIMENSION IN SYSTOLE: 3.24 CM (ref 2.1–4)
LEFT VENTRICLE DIASTOLIC VOLUME INDEX: 53.53 ML/M2
LEFT VENTRICLE DIASTOLIC VOLUME: 109.2 ML
LEFT VENTRICLE MASS INDEX: 108 G/M2
LEFT VENTRICLE SYSTOLIC VOLUME INDEX: 20.6 ML/M2
LEFT VENTRICLE SYSTOLIC VOLUME: 42.1 ML
LEFT VENTRICULAR INTERNAL DIMENSION IN DIASTOLE: 4.83 CM (ref 3.5–6)
LEFT VENTRICULAR MASS: 221.29 G
LV LATERAL E/E' RATIO: 8.43 M/S
LV SEPTAL E/E' RATIO: 11.8 M/S
MV PEAK A VEL: 0.4 M/S
MV PEAK E VEL: 0.59 M/S
MV STENOSIS PRESSURE HALF TIME: 54.15 MS
MV VALVE AREA P 1/2 METHOD: 4.06 CM2
PISA TR MAX VEL: 3.02 M/S
PV MEAN GRADIENT: 2.91 MMHG
PV PEAK VELOCITY: 1.58 CM/S
RA MAJOR: 6.15 CM
RA WIDTH: 4.62 CM
RIGHT VENTRICULAR END-DIASTOLIC DIMENSION: 4.3 CM
RV TISSUE DOPPLER FREE WALL SYSTOLIC VELOCITY 1 (APICAL 4 CHAMBER VIEW): 5.88 CM/S
SINUS: 3.75 CM
STJ: 3.4 CM
TDI LATERAL: 0.07 M/S
TDI SEPTAL: 0.05 M/S
TDI: 0.06 M/S
TR MAX PG: 36 MMHG
TRICUSPID ANNULAR PLANE SYSTOLIC EXCURSION: 1.12 CM

## 2022-07-07 PROCEDURE — 93010 EKG 12-LEAD: ICD-10-PCS | Mod: S$PBB,,, | Performed by: INTERNAL MEDICINE

## 2022-07-07 PROCEDURE — 3008F BODY MASS INDEX DOCD: CPT | Mod: CPTII,,, | Performed by: PEDIATRICS

## 2022-07-07 PROCEDURE — 93303 ECHO (CUPID ONLY): ICD-10-PCS | Mod: 26,,, | Performed by: PEDIATRICS

## 2022-07-07 PROCEDURE — 3077F PR MOST RECENT SYSTOLIC BLOOD PRESSURE >= 140 MM HG: ICD-10-PCS | Mod: CPTII,,, | Performed by: PEDIATRICS

## 2022-07-07 PROCEDURE — 99999 PR PBB SHADOW E&M-EST. PATIENT-LVL IV: CPT | Mod: PBBFAC,,, | Performed by: PEDIATRICS

## 2022-07-07 PROCEDURE — 3079F DIAST BP 80-89 MM HG: CPT | Mod: CPTII,,, | Performed by: PEDIATRICS

## 2022-07-07 PROCEDURE — 93303 ECHO TRANSTHORACIC: CPT

## 2022-07-07 PROCEDURE — 99999 PR PBB SHADOW E&M-EST. PATIENT-LVL IV: ICD-10-PCS | Mod: PBBFAC,,, | Performed by: PEDIATRICS

## 2022-07-07 PROCEDURE — 3008F PR BODY MASS INDEX (BMI) DOCUMENTED: ICD-10-PCS | Mod: CPTII,,, | Performed by: PEDIATRICS

## 2022-07-07 PROCEDURE — 3077F SYST BP >= 140 MM HG: CPT | Mod: CPTII,,, | Performed by: PEDIATRICS

## 2022-07-07 PROCEDURE — 99214 OFFICE O/P EST MOD 30 MIN: CPT | Mod: PBBFAC,25 | Performed by: PEDIATRICS

## 2022-07-07 PROCEDURE — 3079F PR MOST RECENT DIASTOLIC BLOOD PRESSURE 80-89 MM HG: ICD-10-PCS | Mod: CPTII,,, | Performed by: PEDIATRICS

## 2022-07-07 PROCEDURE — 99214 OFFICE O/P EST MOD 30 MIN: CPT | Mod: 25,S$PBB,, | Performed by: PEDIATRICS

## 2022-07-07 PROCEDURE — 93325 DOPPLER ECHO COLOR FLOW MAPG: CPT | Mod: 26,,, | Performed by: PEDIATRICS

## 2022-07-07 PROCEDURE — 93320 DOPPLER ECHO COMPLETE: CPT | Mod: 26,,, | Performed by: PEDIATRICS

## 2022-07-07 PROCEDURE — 93005 ELECTROCARDIOGRAM TRACING: CPT | Mod: PBBFAC | Performed by: INTERNAL MEDICINE

## 2022-07-07 PROCEDURE — 93303 ECHO TRANSTHORACIC: CPT | Mod: 26,,, | Performed by: PEDIATRICS

## 2022-07-07 PROCEDURE — 1159F MED LIST DOCD IN RCRD: CPT | Mod: CPTII,,, | Performed by: PEDIATRICS

## 2022-07-07 PROCEDURE — 93325 ECHO (CUPID ONLY): ICD-10-PCS | Mod: 26,,, | Performed by: PEDIATRICS

## 2022-07-07 PROCEDURE — 93320 ECHO (CUPID ONLY): ICD-10-PCS | Mod: 26,,, | Performed by: PEDIATRICS

## 2022-07-07 PROCEDURE — 1159F PR MEDICATION LIST DOCUMENTED IN MEDICAL RECORD: ICD-10-PCS | Mod: CPTII,,, | Performed by: PEDIATRICS

## 2022-07-07 PROCEDURE — 93010 ELECTROCARDIOGRAM REPORT: CPT | Mod: S$PBB,,, | Performed by: INTERNAL MEDICINE

## 2022-07-07 PROCEDURE — 99214 PR OFFICE/OUTPT VISIT, EST, LEVL IV, 30-39 MIN: ICD-10-PCS | Mod: 25,S$PBB,, | Performed by: PEDIATRICS

## 2022-07-07 NOTE — PROGRESS NOTES
2022    Re:Thomas Horvath  :1990     Sissy Lacy MD  [address] [facility] [place] [address]    [facility]    Thomas Horvath is a 31 y.o. male with the following diagnoses:  1. 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   - epicardial left ventricular pacing wires in place.   3. 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 Mild pulmonary artery hypertension noted in the past.   5. Trace aortic insufficiency and mild aortic root enlargement  6. Long history of decreased LV function - possibly exacerbated by high afterload.   - mildly improved with ejection fraction around 40%  7. Hypertension - poor compliance in the past with medications, improved  Strong family history of hypertension.       Recommendations:  1.  Continue current medications  2.  Continue off aldactone and lisinopril  3.  Follow-up 6 months with echo, ekg, labs (CBC, CMP, BNP, Holter)  4.  SBE prophylaxis is definitely indicated.  Regular dental care strongly recommended.  5.  Any worsening shortness of breath, significant palpitations, syncope, chest pain, neurologic changes, or worsening headache or other concerning symptoms should prompt emergency room visit.  6.  Once again stressed need for good compliance with medications.    Discussion:    1.  From a congenital heart standpoint he looks great.  The pulmonary valve is working extremely well, and his right ventricle looks well.  2.  His blood pressure isn't perfect, but it is significantly improved compared to last year.  His ventricular function has improved to small amount, and his left ventricle does not look as thick.  He is on a lot of medications.  When I see him in 6 months, we will reassess function.  If his blood pressure is  elevated at that time, I will likely go up on his hydralazine dose.  3.  I will see him again with an echo in 6 months.  If despite better compliance his LV function remains significantly decreased, I will refer him to the heart failure service.  We would also consider whether an ICD was indicated.    Interval history:  Overall, he has done well since I last saw him in clinic.  No chest pain or palpitations.  No syncope or near-syncope.  No edema.  No worsening shortness of breath.  He reports good compliance with his medication.  He works a very physical job, at outside in construction.  He has no problems keeping up with his peers.  Occasional right-sided side pain with exercise.    Recent past medical history:    I saw him in clinic on April 8, 2021 after not seeing him for 4 years.  He had been in half-way.  In my clinic, he was significantly hypertensive.  While he was in half-way, it looks like he had significant hypertension.  In addition to his normal lisinopril and carvedilol, he was placed on hydralazine and amlodipine.  He was also on a statin.  When he got out of half-way 2 months prior to that visit with, he went back to only taking his original medications including the carvedilol and the lisinopril.  Had not been taking the amlodipine or the hydralazine.  Despite restarting his hydralazine and his amlodipine, he continued to be quite hypertensive.  I discussed his case with Dr. Arrington, who recommend transitioning to Entresto given the decreased left ventricular function.    The review of systems is as noted above. It is otherwise negative for other symptoms related to the general, neurological, psychiatric, endocrine, gastrointestinal, genitourinary, respiratory, dermatologic, musculoskeletal, hematologic, and immunologic systems.    Past Medical History:   Diagnosis Date    Aortic insufficiency     trace    Homograft cardiac valve stenosis     mild    Hypertension     Left ventricular dysfunction      Pulmonary artery hypertension     RV hypertension    Right ventricular dilation     S/P TOF (tetralogy of Fallot) repair 2014    TOF (tetralogy of Fallot)      Past Surgical History:   Procedure Laterality Date    balloon dilation of pulmonary atrery homograft      CARDIAC VALVE SURGERY      7/23/15 @ [facility]    epicardial pacing wires      LV    pulmonary valve replacement  2006    TETRALOGY OF FALLOT REPAIR       Family History   Problem Relation Age of Onset    Hypertension Mother     No Known Problems Father     No Known Problems Sister     No Known Problems Brother     No Known Problems Maternal Grandmother     No Known Problems Maternal Grandfather     No Known Problems Paternal Grandmother     No Known Problems Paternal Grandfather     No Known Problems Maternal Aunt     No Known Problems Maternal Uncle     No Known Problems Paternal Aunt     No Known Problems Paternal Uncle     Anemia Neg Hx     Arrhythmia Neg Hx     Asthma Neg Hx     Clotting disorder Neg Hx     Fainting Neg Hx     Heart attack Neg Hx     Heart disease Neg Hx     Heart failure Neg Hx     Hyperlipidemia Neg Hx     Stroke Neg Hx     Atrial Septal Defect Neg Hx      Social History     Socioeconomic History    Marital status: Single    Years of education: 10   Tobacco Use    Smoking status: Former Smoker     Start date: 2006     Quit date: 2015     Years since quittin.0    Smokeless tobacco: Never Used    Tobacco comment: marijuana   Substance and Sexual Activity    Alcohol use: No     Alcohol/week: 0.0 standard drinks    Drug use: Yes     Frequency: 7.0 times per week     Types: Marijuana    Sexual activity: Yes     Partners: Female   Social History Narrative    Lives at home with mother at this time.  He is uninsured and not working.     Current Outpatient Medications on File Prior to Visit   Medication Sig Dispense Refill    amLODIPine (NORVASC) 10 MG tablet Take 1  "tablet (10 mg total) by mouth once daily. 30 tablet 0    aspirin 81 MG Chew Take 1 tablet (81 mg total) by mouth once daily. 30 tablet 11    carvediloL (COREG) 25 MG tablet Take 1 tablet (25 mg total) by mouth 2 (two) times daily. 60 tablet 0    digoxin (LANOXIN) 125 mcg tablet Take 1 tablet (125 mcg total) by mouth once daily. 30 tablet 0    hydrALAZINE (APRESOLINE) 50 MG tablet Take 1 tablet (50 mg total) by mouth 3 (three) times daily. 90 tablet 11    ibuprofen (ADVIL,MOTRIN) 800 MG tablet Take 800 mg by mouth every 6 (six) hours as needed.      rosuvastatin (CRESTOR) 5 MG tablet Take 5 mg by mouth once daily.      sacubitriL-valsartan (ENTRESTO)  mg per tablet Take 1 tablet by mouth 2 (two) times daily. 60 tablet 11    HYDROcodone-acetaminophen (NORCO) 5-325 mg per tablet Take 1 tablet by mouth every 6 (six) hours as needed.       No current facility-administered medications on file prior to visit.       No Known Allergies    BP (!) 152/85 (BP Location: Left arm, Patient Position: Sitting, BP Method: Large (Automatic))   Pulse 77   Ht 5' 6" (1.676 m)   Wt 88.1 kg (194 lb 3.6 oz)   SpO2 97%   BMI 31.35 kg/m²    Vitals:    07/07/22 1105 07/07/22 1106 07/07/22 1216   BP: (!) 145/85 (!) 152/85 (!) 140/84   BP Location: Right arm Left arm Left arm   Patient Position: Sitting Sitting    BP Method: Large (Automatic) Large (Automatic)    Pulse: 77 77    SpO2: 97%     Weight: 88.1 kg (194 lb 3.6 oz)     Height: 5' 6" (1.676 m)         In general, he is an obese but otherwise healthy appearing, nondysmorphic-appearing, black male in no apparent distress. The eyes, nares, and oropharynx are clear.  The neck is supple without thyroid enlargement.  There is very mild jugular venous distention.  The lungs are clear to auscultation bilaterally. There is no wheezing.  There are no rales.  A well-healed median sternotomy incision is noted.  The first heart sound is normal.  A grade 1/6 systolic ejection murmur " is best auscultated at the upper left sternal border.  There are no diastolic murmurs or gallops.  The second heart sound is fixed and split.  The abdominal exam is benign without hepatosplenomegaly, tenderness, or distention.  Pulses are normal in all extremities except the right hand where it is mildly decreased.  There is no clubbing, cyanosis, or edema.  No rashes are noted.  He does have a tattoo on the right arm.    I personally reviewed the following tests performed today and my interpretation follows:  EKG today with normal sinus rhythm with right atrial enlargement and possible biatrial enlargement along with a nonspecific interventricular conduction delay and LVH    Results for orders placed in visit on 02/10/22    Echo Saline Bubble? No    Interpretation Summary  CONGENITAL CARDIAC HISTORY:  Tetralogy of Fallot s/p repair  23-mm pulmonary homograft in pulmonary position ? 7/7/2006.  Left ventricular pacing wires.  Recurrent severe right ventricular hypertension: Multiple balloon dilations of pulmonary homograft (last 2010)  Anne valve 7/2015    Basic Anatomical Connections:  Abdominal situs is solitus. Atrial situs is normal. Atrioventricular concordance. Grossly normal tricuspid and mitral valve structure. . RV dilation and RV hypertrophy. Ventriculoarterial concordance. Aortic valve is normal and pulmonic valve is abnormal. Ventricular loop: D-loop. Cardiac position is levocardia. Left aortic arch branching. No ventricular septal defect present.        IMPRESSION:  Qualitative improvement in tricuspid insufficiency and minimal improvement in LV systolic function compared previous report-  Qualitative impression of mildly dilated right atrium  No obvious signs of previously reported dilation of the tricuspid annulus.  There is trivial to mild tricuspid insufficiency.  Mild right ventricle hypertrophy with moderate dilation.  Qualitative impression mildly reduced systolic function of the inlet and  apical segments of the right ventricle with large outflow tract patch.  Inadequate Doppler profile of tricuspid insufficiency to estimate right ventricular systolic pressure.  Echodensity consistent with patch repair of ventricular septal defect and malalignment of the ventricular septum with no residual shunt demonstrated.  Images consistent with Anne implant in pulmonary homograft with peak velocity <1.8 m/sec and mean <8 mm Hg.  No significant pulmonary insufficiency.  Normal size confluent proximal pulmonary branches  Left atrium appears normal in size but compressed by RA dilation.  Trivial mitral valve insufficiency.  Qualitative impression of mild concentric left ventricular hypertrophy.  Mildly paradoxical septal motion with decreased movement of the LV free wall, SF = 19% and EF estimated 35 -40% from apical views.  Grade I left ventricular diastolic dysfunction.  Trivial aortic valve insufficiency.  Aortic dimensions:  Sinuses of Valsalva = 40 mm.  ST junction             = 34 mm.  Ascending aorta     = 35 mm.  Normal aortic arch with no evidence of coarctation.  No pericardial effusion.     Latest Reference Range & Units 10/14/21 16:13 07/07/22 10:04   BNP 0 - 99 pg/mL 111 (H) 138 (H)   (H): Data is abnormally high    BMP  Lab Results   Component Value Date     07/07/2022    K 4.2 07/07/2022     07/07/2022    CO2 27 07/07/2022    BUN 11 07/07/2022    CREATININE 1.0 07/07/2022    CALCIUM 10.4 07/07/2022    ANIONGAP 9 07/07/2022    ESTGFRAFRICA >60.0 07/07/2022    EGFRNONAA >60.0 07/07/2022       Cath 7/23/15:  IMPRESSION:  1. Repaired tetralogy of Fallot with severe homograft stenosis, peak gradient 40 mmHg.  2. Right and left ventricular dysfunction and low cardiac output, normal pulmonary vascular resistance calculations.  3. Right aortic arch.  4. Right coronary artery from left sinus of Valsalva.  5. RV to PA conduit dilation with Reina 16, 18, 20 and 22 mm balloons.  6. RV to pulmonary  artery conduit stent with Palmaz 3110 on 22 x 4 BIB (x3), postdilated with Reina 22 mm balloon (16 atmospheres).  7. Anne valve implantation on 22-[place] Ensemble, residual gradient 9 mmHg, trivial insufficiency.    Sincerely,        Ino Corona MD  Pediatric Cardiology  Adult Congenital Heart Disease  Pediatric Heart Failure and Transplantation  [facility] [address] [place]  [address]  [phone]

## 2023-01-18 ENCOUNTER — PATIENT MESSAGE (OUTPATIENT)
Dept: PEDIATRIC CARDIOLOGY | Facility: CLINIC | Age: 33
End: 2023-01-18
Payer: MEDICAID

## 2023-01-18 DIAGNOSIS — I51.7 RIGHT VENTRICULAR DILATION: ICD-10-CM

## 2023-01-18 DIAGNOSIS — I37.0 PULMONARY VALVE STENOSIS, UNSPECIFIED ETIOLOGY: ICD-10-CM

## 2023-01-18 DIAGNOSIS — Q25.47 RIGHT AORTIC ARCH: ICD-10-CM

## 2023-01-18 DIAGNOSIS — I42.0 DILATED CARDIOMYOPATHY: ICD-10-CM

## 2023-01-18 DIAGNOSIS — Z95.4 S/P TRANSCATHETER REPLACEMENT OF PULMONARY VALVE: ICD-10-CM

## 2023-01-18 DIAGNOSIS — Z87.74 S/P TOF (TETRALOGY OF FALLOT) REPAIR: Primary | Chronic | ICD-10-CM

## 2023-03-23 ENCOUNTER — HOSPITAL ENCOUNTER (OUTPATIENT)
Dept: CARDIOLOGY | Facility: CLINIC | Age: 33
Discharge: HOME | End: 2023-03-23
Attending: PEDIATRICS
Payer: MEDICAID

## 2023-03-23 ENCOUNTER — HOSPITAL ENCOUNTER (OUTPATIENT)
Dept: CARDIOLOGY | Facility: HOSPITAL | Age: 33
Discharge: HOME | End: 2023-03-23
Attending: PEDIATRICS
Payer: MEDICAID

## 2023-03-23 ENCOUNTER — OFFICE VISIT (OUTPATIENT)
Dept: CARDIOLOGY | Facility: CLINIC | Age: 33
End: 2023-03-23
Attending: PEDIATRICS
Payer: MEDICAID

## 2023-03-23 VITALS
DIASTOLIC BLOOD PRESSURE: 82 MMHG | SYSTOLIC BLOOD PRESSURE: 145 MMHG | OXYGEN SATURATION: 97 % | BODY MASS INDEX: 31.53 KG/M2 | WEIGHT: 196.19 LBS | HEART RATE: 76 BPM | HEIGHT: 66 IN

## 2023-03-23 VITALS — HEIGHT: 66 IN | BODY MASS INDEX: 31.18 KG/M2 | WEIGHT: 194 LBS

## 2023-03-23 DIAGNOSIS — I51.7 RIGHT VENTRICULAR DILATION: ICD-10-CM

## 2023-03-23 DIAGNOSIS — Z98.890 S/P RIGHT VENTRICLE TO PULMONARY ARTERY (RV-PA) CONDUIT: ICD-10-CM

## 2023-03-23 DIAGNOSIS — I42.0 DILATED CARDIOMYOPATHY: ICD-10-CM

## 2023-03-23 DIAGNOSIS — Z87.74 S/P TOF (TETRALOGY OF FALLOT) REPAIR: Primary | Chronic | ICD-10-CM

## 2023-03-23 DIAGNOSIS — Z95.4 S/P TRANSCATHETER REPLACEMENT OF PULMONARY VALVE: ICD-10-CM

## 2023-03-23 DIAGNOSIS — Q25.47 RIGHT AORTIC ARCH: ICD-10-CM

## 2023-03-23 DIAGNOSIS — Z87.74 S/P TOF (TETRALOGY OF FALLOT) REPAIR: ICD-10-CM

## 2023-03-23 DIAGNOSIS — I37.0 PULMONARY VALVE STENOSIS, UNSPECIFIED ETIOLOGY: ICD-10-CM

## 2023-03-23 DIAGNOSIS — I10 ESSENTIAL HYPERTENSION: ICD-10-CM

## 2023-03-23 DIAGNOSIS — I50.20 HFREF (HEART FAILURE WITH REDUCED EJECTION FRACTION): ICD-10-CM

## 2023-03-23 DIAGNOSIS — Z87.74 S/P TOF (TETRALOGY OF FALLOT) REPAIR: Chronic | ICD-10-CM

## 2023-03-23 LAB
ASCENDING AORTA: 3.21 CM
AV INDEX (PROSTH): 0.33
AV MEAN GRADIENT: 4 MMHG
AV PEAK GRADIENT: 7 MMHG
AV VALVE AREA: 2.18 CM2
AV VELOCITY RATIO: 0.39
BSA FOR ECHO PROCEDURE: 2.02 M2
CV ECHO LV RWT: 0.37 CM
DOP CALC AO PEAK VEL: 1.29 M/S
DOP CALC AO VTI: 25.39 CM
DOP CALC LVOT AREA: 6.6 CM2
DOP CALC LVOT DIAMETER: 2.9 CM
DOP CALC LVOT PEAK VEL: 0.5 M/S
DOP CALC LVOT STROKE VOLUME: 55.46 CM3
DOP CALC RVOT PEAK VEL: 0.77 M/S
DOP CALC RVOT VTI: 16.71 CM
DOP CALCLVOT PEAK VEL VTI: 8.4 CM
E WAVE DECELERATION TIME: 136.11 MSEC
E/A RATIO: 1.4
E/E' RATIO: 12.83 M/S
ECHO LV POSTERIOR WALL: 0.97 CM (ref 0.6–1.1)
EJECTION FRACTION: 40 %
FRACTIONAL SHORTENING: 29 % (ref 28–44)
INTERVENTRICULAR SEPTUM: 1.11 CM (ref 0.6–1.1)
LA MAJOR: 6.23 CM
LA MINOR: 5.56 CM
LA WIDTH: 3.88 CM
LEFT ATRIUM SIZE: 4.69 CM
LEFT ATRIUM VOLUME INDEX: 45.9 ML/M2
LEFT ATRIUM VOLUME: 90.89 CM3
LEFT INTERNAL DIMENSION IN SYSTOLE: 3.74 CM (ref 2.1–4)
LEFT VENTRICLE DIASTOLIC VOLUME INDEX: 66.6 ML/M2
LEFT VENTRICLE DIASTOLIC VOLUME: 131.87 ML
LEFT VENTRICLE MASS INDEX: 105 G/M2
LEFT VENTRICLE SYSTOLIC VOLUME INDEX: 30.1 ML/M2
LEFT VENTRICLE SYSTOLIC VOLUME: 59.67 ML
LEFT VENTRICULAR INTERNAL DIMENSION IN DIASTOLE: 5.24 CM (ref 3.5–6)
LEFT VENTRICULAR MASS: 207.22 G
LV LATERAL E/E' RATIO: 11 M/S
LV SEPTAL E/E' RATIO: 15.4 M/S
MV A" WAVE DURATION": 11.99 MSEC
MV PEAK A VEL: 0.55 M/S
MV PEAK E VEL: 0.77 M/S
MV STENOSIS PRESSURE HALF TIME: 39.47 MS
MV VALVE AREA P 1/2 METHOD: 5.57 CM2
PISA TR MAX VEL: 2.65 M/S
PULM VEIN S/D RATIO: 0.51
PV MEAN GRADIENT: 11.77 MMHG
PV PEAK D VEL: 0.65 M/S
PV PEAK S VEL: 0.33 M/S
PV PEAK VELOCITY: 2.19 CM/S
RA MAJOR: 6.75 CM
RA WIDTH: 4.23 CM
RIGHT VENTRICULAR END-DIASTOLIC DIMENSION: 3.81 CM
RV TISSUE DOPPLER FREE WALL SYSTOLIC VELOCITY 1 (APICAL 4 CHAMBER VIEW): 8.08 CM/S
SINUS: 3.91 CM
STJ: 3.05 CM
TDI LATERAL: 0.07 M/S
TDI SEPTAL: 0.05 M/S
TDI: 0.06 M/S
TR MAX PG: 28 MMHG
TRICUSPID ANNULAR PLANE SYSTOLIC EXCURSION: 1.43 CM

## 2023-03-23 PROCEDURE — 99999 PR PBB SHADOW E&M-EST. PATIENT-LVL IV: ICD-10-PCS | Mod: PBBFAC,,, | Performed by: PEDIATRICS

## 2023-03-23 PROCEDURE — 93303 ECHO TRANSTHORACIC: CPT | Mod: 26,,, | Performed by: PEDIATRICS

## 2023-03-23 PROCEDURE — 3077F SYST BP >= 140 MM HG: CPT | Mod: CPTII,,, | Performed by: PEDIATRICS

## 2023-03-23 PROCEDURE — 93010 EKG 12-LEAD: ICD-10-PCS | Mod: S$PBB,,, | Performed by: INTERNAL MEDICINE

## 2023-03-23 PROCEDURE — 93320 ECHO (CUPID ONLY): ICD-10-PCS | Mod: 26,,, | Performed by: PEDIATRICS

## 2023-03-23 PROCEDURE — 93325 ECHO (CUPID ONLY): ICD-10-PCS | Mod: 26,,, | Performed by: PEDIATRICS

## 2023-03-23 PROCEDURE — 93320 DOPPLER ECHO COMPLETE: CPT | Mod: 26,,, | Performed by: PEDIATRICS

## 2023-03-23 PROCEDURE — 93325 DOPPLER ECHO COLOR FLOW MAPG: CPT

## 2023-03-23 PROCEDURE — 3079F PR MOST RECENT DIASTOLIC BLOOD PRESSURE 80-89 MM HG: ICD-10-PCS | Mod: CPTII,,, | Performed by: PEDIATRICS

## 2023-03-23 PROCEDURE — 3077F PR MOST RECENT SYSTOLIC BLOOD PRESSURE >= 140 MM HG: ICD-10-PCS | Mod: CPTII,,, | Performed by: PEDIATRICS

## 2023-03-23 PROCEDURE — 1159F PR MEDICATION LIST DOCUMENTED IN MEDICAL RECORD: ICD-10-PCS | Mod: CPTII,,, | Performed by: PEDIATRICS

## 2023-03-23 PROCEDURE — 99214 OFFICE O/P EST MOD 30 MIN: CPT | Mod: S$PBB,,, | Performed by: PEDIATRICS

## 2023-03-23 PROCEDURE — 93325 DOPPLER ECHO COLOR FLOW MAPG: CPT | Mod: 26,,, | Performed by: PEDIATRICS

## 2023-03-23 PROCEDURE — 93005 ELECTROCARDIOGRAM TRACING: CPT | Mod: PBBFAC | Performed by: INTERNAL MEDICINE

## 2023-03-23 PROCEDURE — 99214 PR OFFICE/OUTPT VISIT, EST, LEVL IV, 30-39 MIN: ICD-10-PCS | Mod: S$PBB,,, | Performed by: PEDIATRICS

## 2023-03-23 PROCEDURE — 1159F MED LIST DOCD IN RCRD: CPT | Mod: CPTII,,, | Performed by: PEDIATRICS

## 2023-03-23 PROCEDURE — 99214 OFFICE O/P EST MOD 30 MIN: CPT | Mod: PBBFAC,25 | Performed by: PEDIATRICS

## 2023-03-23 PROCEDURE — 3008F BODY MASS INDEX DOCD: CPT | Mod: CPTII,,, | Performed by: PEDIATRICS

## 2023-03-23 PROCEDURE — 3079F DIAST BP 80-89 MM HG: CPT | Mod: CPTII,,, | Performed by: PEDIATRICS

## 2023-03-23 PROCEDURE — 4010F ACE/ARB THERAPY RXD/TAKEN: CPT | Mod: CPTII,,, | Performed by: PEDIATRICS

## 2023-03-23 PROCEDURE — 3008F PR BODY MASS INDEX (BMI) DOCUMENTED: ICD-10-PCS | Mod: CPTII,,, | Performed by: PEDIATRICS

## 2023-03-23 PROCEDURE — 93010 ELECTROCARDIOGRAM REPORT: CPT | Mod: S$PBB,,, | Performed by: INTERNAL MEDICINE

## 2023-03-23 PROCEDURE — 99999 PR PBB SHADOW E&M-EST. PATIENT-LVL IV: CPT | Mod: PBBFAC,,, | Performed by: PEDIATRICS

## 2023-03-23 PROCEDURE — 93303 ECHO (CUPID ONLY): ICD-10-PCS | Mod: 26,,, | Performed by: PEDIATRICS

## 2023-03-23 PROCEDURE — 4010F PR ACE/ARB THEARPY RXD/TAKEN: ICD-10-PCS | Mod: CPTII,,, | Performed by: PEDIATRICS

## 2023-03-23 NOTE — PROGRESS NOTES
2023    Re:Thomas Horvath  :1990     Sissy Lacy MD  [address]  [facility] [place] [address]    [facility]    Thomas Horvath is a 32 y.o. male with the following diagnoses:  1. 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   - epicardial left ventricular pacing wires in place.   3. 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 Mild pulmonary artery hypertension noted in the past.   5. Trace aortic insufficiency and mild aortic root enlargement  6. Long history of decreased LV function - possibly exacerbated by high afterload.   - mildly improved with ejection fraction around 40-45%  7. Hypertension - poor compliance in the past with medications, improved  Strong family history of hypertension.       Recommendations:  1.  Continue current medications  2.  recheck blood work today and consider adding back spironolactone if potassium is OK  3.  Follow-up 1 year with echo, ekg.  Likely see with BP check, holter and repeat labs in 6 months.  4.  SBE prophylaxis is definitely indicated.  Regular dental care strongly recommended.  5.  Any worsening shortness of breath, significant palpitations, syncope, chest pain, neurologic changes, or worsening headache or other concerning symptoms should prompt emergency room visit.  6.  Once again stressed need for good compliance with medications.    Discussion:    1.  From a congenital heart standpoint he looks great.  The pulmonary valve is working well.  2.  His blood pressure isn't perfect, but it is significantly improved compared to last year.  His ventricular function has improved to small amount, and his left ventricle does not look as thick.  He is on a lot of medications.  When I see him in 6 months, we will reassess  function.  If his blood pressure is elevated at that time, I will likely go up on his hydralazine dose.  3.  I will see him again with an echo in 6 months.  If despite better compliance his LV function remains significantly decreased, I will refer him to the heart failure service.  We would also consider whether an ICD was indicated.    Interval history:  Overall, he has done well since I last saw him in clinic.  No chest pain or palpitations.  No syncope or near-syncope.  No edema.  No worsening shortness of breath.  He reports good compliance with his medication.  He works a very physical job, outside in construction.  He has no problems keeping up with his peers.  After an especially difficult work day, he feels very tired.    Recent past medical history:    I saw him in clinic on April 8, 2021 after not seeing him for 4 years.  He had been in assisted.  In my clinic, he was significantly hypertensive.  While he was in assisted, it looks like he had significant hypertension.  In addition to his normal lisinopril and carvedilol, he was placed on hydralazine and amlodipine.  He was also on a statin.  When he got out of assisted 2 months prior to that visit with, he went back to only taking his original medications including the carvedilol and the lisinopril.  Had not been taking the amlodipine or the hydralazine.  Despite restarting his hydralazine and his amlodipine, he continued to be quite hypertensive.  I discussed his case with Dr. Arrington, who recommend transitioning to Entresto given the decreased left ventricular function.  When I started the Entresto, I purposely did not restart his spironolactone due to concerns about affecting potassium levels.  He was supposed to be on that medication, but he had been noncompliant with it.  No noted side effects from spironolactone.    The review of systems is as noted above. It is otherwise negative for other symptoms related to the general, neurological, psychiatric,  endocrine, gastrointestinal, genitourinary, respiratory, dermatologic, musculoskeletal, hematologic, and immunologic systems.    Past Medical History:   Diagnosis Date    Aortic insufficiency     trace    Homograft cardiac valve stenosis     mild    Hypertension     Left ventricular dysfunction     Pulmonary artery hypertension     RV hypertension    Right ventricular dilation     S/P TOF (tetralogy of Fallot) repair 2014    TOF (tetralogy of Fallot)      Past Surgical History:   Procedure Laterality Date    balloon dilation of pulmonary atrery homograft      CARDIAC VALVE SURGERY      7/23/15 @ [facility] [place]    epicardial pacing wires      LV    pulmonary valve replacement  2006    TETRALOGY OF FALLOT REPAIR       Family History   Problem Relation Age of Onset    Hypertension Mother     No Known Problems Father     No Known Problems Sister     No Known Problems Brother     No Known Problems Maternal Grandmother     No Known Problems Maternal Grandfather     No Known Problems Paternal Grandmother     No Known Problems Paternal Grandfather     No Known Problems Maternal Aunt     No Known Problems Maternal Uncle     No Known Problems Paternal Aunt     No Known Problems Paternal Uncle     Anemia Neg Hx     Arrhythmia Neg Hx     Asthma Neg Hx     Clotting disorder Neg Hx     Fainting Neg Hx     Heart attack Neg Hx     Heart disease Neg Hx     Heart failure Neg Hx     Hyperlipidemia Neg Hx     Stroke Neg Hx     Atrial Septal Defect Neg Hx      Social History     Socioeconomic History    Marital status: Single    Years of education: 10   Tobacco Use    Smoking status: Former     Types: Cigarettes     Start date: 2006     Quit date: 2015     Years since quittin.7    Smokeless tobacco: Never    Tobacco comments:     marijuana   Substance and Sexual Activity    Alcohol use: No     Alcohol/week: 0.0 standard drinks    Drug use: Yes     Frequency: 7.0 times per week     Types: Marijuana    Sexual  "activity: Yes     Partners: Female   Social History Narrative    Lives at home with mother at this time.  He is uninsured and not working.     Current Outpatient Medications on File Prior to Visit   Medication Sig Dispense Refill    amLODIPine (NORVASC) 10 MG tablet Take 1 tablet (10 mg total) by mouth once daily. 30 tablet 0    aspirin 81 MG Chew Take 1 tablet (81 mg total) by mouth once daily. 30 tablet 11    carvediloL (COREG) 25 MG tablet Take 1 tablet (25 mg total) by mouth 2 (two) times daily. 60 tablet 0    digoxin (LANOXIN) 125 mcg tablet Take 1 tablet (125 mcg total) by mouth once daily. 30 tablet 0    hydrALAZINE (APRESOLINE) 50 MG tablet Take 1 tablet (50 mg total) by mouth 3 (three) times daily. 90 tablet 11    ibuprofen (ADVIL,MOTRIN) 800 MG tablet Take 800 mg by mouth every 6 (six) hours as needed.      rosuvastatin (CRESTOR) 5 MG tablet Take 5 mg by mouth once daily.      sacubitriL-valsartan (ENTRESTO)  mg per tablet Take 1 tablet by mouth 2 (two) times daily. 60 tablet 11    HYDROcodone-acetaminophen (NORCO) 5-325 mg per tablet Take 1 tablet by mouth every 6 (six) hours as needed.       No current facility-administered medications on file prior to visit.       No Known Allergies    BP (!) 145/82 (BP Location: Left arm, Patient Position: Sitting, BP Method: Large (Automatic))   Pulse 76   Ht 5' 6" (1.676 m)   Wt 89 kg (196 lb 3.4 oz)   SpO2 97%   BMI 31.67 kg/m²    Vitals:    03/23/23 1322 03/23/23 1324   BP: (!) 140/86 (!) 145/82   BP Location: Right arm Left arm   Patient Position: Sitting Sitting   BP Method: Large (Automatic) Large (Automatic)   Pulse: 76 76   SpO2: 97%    Weight: 89 kg (196 lb 3.4 oz)    Height: 5' 6" (1.676 m)        In general, he is an obese but otherwise healthy appearing, nondysmorphic-appearing, black male in no apparent distress. The eyes, nares, and oropharynx are clear.  The neck is supple without thyroid enlargement.  There is very mild jugular venous " distention.  The lungs are clear to auscultation bilaterally. There is no wheezing.  There are no rales.  A well-healed median sternotomy incision is noted.  The first heart sound is normal.  A grade 2/6 systolic ejection murmur is best auscultated at the upper left sternal border.  There are no diastolic murmurs or gallops.  The second heart sound is fixed and split.  The abdominal exam is benign without hepatosplenomegaly, tenderness, or distention.  Pulses are normal in all extremities except the right hand where it is mildly decreased.  There is no clubbing, cyanosis, or edema.  No rashes are noted.  He does have a tattoo on the right arm.    I personally reviewed the following tests performed today and my interpretation follows:  EKG today with normal sinus rhythm with left atrial enlargement and and a nonspecific interventricular conduction delay.    I personally reviewed the echo performed today.  Official results pending.  My interpretation:  Results for orders placed in visit on 02/10/22  Echo Saline Bubble? No  IMPRESSION:  Qualitative improvement in tricuspid insufficiency and minimal improvement in LV systolic function compared previous report-  Qualitative impression of mildly dilated right atrium  No obvious signs of previously reported dilation of the tricuspid annulus.  There is trivial to mild tricuspid insufficiency.  Mild right ventricle hypertrophy with moderate dilation.  Qualitative impression mildly reduced systolic function of the inlet and apical segments of the right ventricle with large outflow tract patch.  Inadequate Doppler profile of tricuspid insufficiency to estimate right ventricular systolic pressure.  Echodensity consistent with patch repair of ventricular septal defect and malalignment of the ventricular septum with no residual shunt demonstrated.  Images consistent with Anne implant in pulmonary homograft with peak velocity <2 m/sec   No significant pulmonary  insufficiency.  Normal size confluent proximal pulmonary branches  Left atrium appears normal in size but compressed by RA dilation.  Trivial mitral valve insufficiency.  Qualitative impression of mild concentric left ventricular hypertrophy.  Mildly paradoxical septal motion with decreased movement of the LV free wall, SF = 29% and EF estimated 40-45% from apical views?  Grade I left ventricular diastolic dysfunction.  Trivial aortic valve insufficiency.  No pericardial effusion.         Latest Reference Range & Units 10/14/21 16:13 07/07/22 10:04   BNP 0 - 99 pg/mL 111 (H) 138 (H)   (H): Data is abnormally high    BMP  Lab Results   Component Value Date     07/07/2022    K 4.2 07/07/2022     07/07/2022    CO2 27 07/07/2022    BUN 11 07/07/2022    CREATININE 1.0 07/07/2022    CALCIUM 10.4 07/07/2022    ANIONGAP 9 07/07/2022    ESTGFRAFRICA >60.0 07/07/2022    EGFRNONAA >60.0 07/07/2022       Cath 7/23/15:  IMPRESSION:  1. Repaired tetralogy of Fallot with severe homograft stenosis, peak gradient 40 mmHg.  2. Right and left ventricular dysfunction and low cardiac output, normal pulmonary vascular resistance calculations.  3. Right aortic arch.  4. Right coronary artery from left sinus of Valsalva.  5. RV to PA conduit dilation with Reina 16, 18, 20 and 22 mm balloons.  6. RV to pulmonary artery conduit stent with Palmaz 3110 on 22 x 4 BIB (x3), postdilated with [place] 22 mm balloon (16 atmospheres).  7. Anne valve implantation on 22-[place] Ensemble, residual gradient 9 mmHg, trivial insufficiency.    Sincerely,        Ino Corona MD  Pediatric Cardiology  Adult Congenital Heart Disease  Pediatric Heart Failure and Transplantation  [facility]  [address] [place]  [address]  [phone]

## 2023-03-29 ENCOUNTER — PATIENT MESSAGE (OUTPATIENT)
Dept: CARDIOLOGY | Facility: CLINIC | Age: 33
End: 2023-03-29
Payer: MEDICAID

## 2023-03-29 ENCOUNTER — TELEPHONE (OUTPATIENT)
Dept: PEDIATRIC CARDIOLOGY | Facility: CLINIC | Age: 33
End: 2023-03-29
Payer: MEDICAID

## 2023-03-29 RX ORDER — SPIRONOLACTONE 25 MG/1
25 TABLET ORAL DAILY
Qty: 30 TABLET | Refills: 11 | Status: SHIPPED | OUTPATIENT
Start: 2023-03-29 | End: 2023-04-19 | Stop reason: SDUPTHER

## 2023-03-29 NOTE — TELEPHONE ENCOUNTER
I left a message on his phone.  I will also send the following portal message:    Overall, your blood work looks good.  Your potassium level is fine.  We are going to restart the spironolactone medication.  You used to be on this.  We had stopped it because I wanted to make sure the Entresto that we started for your blood pressure did not bump your potassium too high.  Your potassium is absolutely fine, and it is safe to start the spironolactone.  This medication can help your blood pressure.  It can also help your heart function.    I am going to send it to your pharmacy.  You take it once a day.  It does not matter if you take it in the morning or at night.  I want to see you back in clinic in about 3 months and recheck your blood pressure and a little bit of blood work.  I will have the nurse set that up.

## 2023-04-20 DIAGNOSIS — I10 ESSENTIAL HYPERTENSION: Primary | ICD-10-CM

## 2023-05-18 ENCOUNTER — PATIENT MESSAGE (OUTPATIENT)
Dept: CARDIOLOGY | Facility: CLINIC | Age: 33
End: 2023-05-18
Payer: MEDICAID

## 2023-07-06 ENCOUNTER — PATIENT MESSAGE (OUTPATIENT)
Dept: CARDIOLOGY | Facility: CLINIC | Age: 33
End: 2023-07-06

## 2023-07-06 ENCOUNTER — OFFICE VISIT (OUTPATIENT)
Dept: CARDIOLOGY | Facility: CLINIC | Age: 33
End: 2023-07-06
Payer: MEDICAID

## 2023-07-06 ENCOUNTER — LAB VISIT (OUTPATIENT)
Dept: LAB | Facility: HOSPITAL | Age: 33
End: 2023-07-06
Payer: MEDICAID

## 2023-07-06 VITALS
HEIGHT: 66 IN | BODY MASS INDEX: 28.95 KG/M2 | SYSTOLIC BLOOD PRESSURE: 152 MMHG | HEART RATE: 72 BPM | WEIGHT: 180.13 LBS | DIASTOLIC BLOOD PRESSURE: 95 MMHG | OXYGEN SATURATION: 99 %

## 2023-07-06 DIAGNOSIS — Z87.74 S/P TOF (TETRALOGY OF FALLOT) REPAIR: Chronic | ICD-10-CM

## 2023-07-06 DIAGNOSIS — I42.0 DILATED CARDIOMYOPATHY: ICD-10-CM

## 2023-07-06 DIAGNOSIS — I10 ESSENTIAL HYPERTENSION: ICD-10-CM

## 2023-07-06 DIAGNOSIS — I50.20 HFREF (HEART FAILURE WITH REDUCED EJECTION FRACTION): Primary | ICD-10-CM

## 2023-07-06 DIAGNOSIS — Z98.890 S/P RIGHT VENTRICLE TO PULMONARY ARTERY (RV-PA) CONDUIT: ICD-10-CM

## 2023-07-06 DIAGNOSIS — Z95.4 S/P TRANSCATHETER REPLACEMENT OF PULMONARY VALVE: ICD-10-CM

## 2023-07-06 DIAGNOSIS — Q24.9 ADULT CONGENITAL HEART DISEASE: ICD-10-CM

## 2023-07-06 DIAGNOSIS — I35.1 NONRHEUMATIC AORTIC VALVE INSUFFICIENCY: ICD-10-CM

## 2023-07-06 LAB
ANION GAP SERPL CALC-SCNC: 8 MMOL/L (ref 8–16)
BUN SERPL-MCNC: 12 MG/DL (ref 6–20)
CALCIUM SERPL-MCNC: 10 MG/DL (ref 8.7–10.5)
CHLORIDE SERPL-SCNC: 105 MMOL/L (ref 95–110)
CO2 SERPL-SCNC: 28 MMOL/L (ref 23–29)
CREAT SERPL-MCNC: 1.1 MG/DL (ref 0.5–1.4)
EST. GFR  (NO RACE VARIABLE): >60 ML/MIN/1.73 M^2
GLUCOSE SERPL-MCNC: 98 MG/DL (ref 70–110)
POTASSIUM SERPL-SCNC: 4.7 MMOL/L (ref 3.5–5.1)
SODIUM SERPL-SCNC: 141 MMOL/L (ref 136–145)

## 2023-07-06 PROCEDURE — 99212 OFFICE O/P EST SF 10 MIN: CPT | Mod: PBBFAC | Performed by: PEDIATRICS

## 2023-07-06 PROCEDURE — 99213 OFFICE O/P EST LOW 20 MIN: CPT | Mod: S$PBB,,, | Performed by: PEDIATRICS

## 2023-07-06 PROCEDURE — 99999 PR PBB SHADOW E&M-EST. PATIENT-LVL II: CPT | Mod: PBBFAC,,, | Performed by: PEDIATRICS

## 2023-07-06 PROCEDURE — 4010F ACE/ARB THERAPY RXD/TAKEN: CPT | Mod: CPTII,,, | Performed by: PEDIATRICS

## 2023-07-06 PROCEDURE — 3080F PR MOST RECENT DIASTOLIC BLOOD PRESSURE >= 90 MM HG: ICD-10-PCS | Mod: CPTII,,, | Performed by: PEDIATRICS

## 2023-07-06 PROCEDURE — 3077F SYST BP >= 140 MM HG: CPT | Mod: CPTII,,, | Performed by: PEDIATRICS

## 2023-07-06 PROCEDURE — 3080F DIAST BP >= 90 MM HG: CPT | Mod: CPTII,,, | Performed by: PEDIATRICS

## 2023-07-06 PROCEDURE — 80048 BASIC METABOLIC PNL TOTAL CA: CPT | Performed by: PEDIATRICS

## 2023-07-06 PROCEDURE — 99999 PR PBB SHADOW E&M-EST. PATIENT-LVL II: ICD-10-PCS | Mod: PBBFAC,,, | Performed by: PEDIATRICS

## 2023-07-06 PROCEDURE — 3008F BODY MASS INDEX DOCD: CPT | Mod: CPTII,,, | Performed by: PEDIATRICS

## 2023-07-06 PROCEDURE — 99213 PR OFFICE/OUTPT VISIT, EST, LEVL III, 20-29 MIN: ICD-10-PCS | Mod: S$PBB,,, | Performed by: PEDIATRICS

## 2023-07-06 PROCEDURE — 36415 COLL VENOUS BLD VENIPUNCTURE: CPT | Performed by: PEDIATRICS

## 2023-07-06 PROCEDURE — 4010F PR ACE/ARB THEARPY RXD/TAKEN: ICD-10-PCS | Mod: CPTII,,, | Performed by: PEDIATRICS

## 2023-07-06 PROCEDURE — 3008F PR BODY MASS INDEX (BMI) DOCUMENTED: ICD-10-PCS | Mod: CPTII,,, | Performed by: PEDIATRICS

## 2023-07-06 PROCEDURE — 3077F PR MOST RECENT SYSTOLIC BLOOD PRESSURE >= 140 MM HG: ICD-10-PCS | Mod: CPTII,,, | Performed by: PEDIATRICS

## 2023-07-06 RX ORDER — CARVEDILOL 25 MG/1
25 TABLET ORAL 2 TIMES DAILY
Qty: 60 TABLET | Refills: 11 | Status: SHIPPED | OUTPATIENT
Start: 2023-07-06

## 2023-07-06 RX ORDER — HYDRALAZINE HYDROCHLORIDE 50 MG/1
50 TABLET, FILM COATED ORAL EVERY 12 HOURS
Qty: 90 TABLET | Refills: 11 | Status: SHIPPED | OUTPATIENT
Start: 2023-07-06

## 2023-07-06 NOTE — PROGRESS NOTES
2023    Re:Thomas Horvath  :1990     Sissy Lacy MD  [address]  [facility] [place] [address]    [facility] Note    Thomas Horvath is a 32 y.o. male with the following diagnoses:  1. 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   - epicardial left ventricular pacing wires in place.   3. 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 Mild pulmonary artery hypertension noted in the past.   5. Trace aortic insufficiency and mild aortic root enlargement  6. Long history of decreased LV function - possibly exacerbated by high afterload.   - mildly improved with ejection fraction around 40-45%  7. Hypertension - poor compliance in the past with medications, still an issue  Strong family history of hypertension.       Recommendations:  1.  Continue current medications, but will make the hydralazine BID to allow better compliance.  Also stressed importance of taking meds as prescribed instead of just daily.  2.  recheck BMP  3.  Follow-up 6 months with echo, ekg.    4.  SBE prophylaxis is definitely indicated.  Regular dental care strongly recommended.  5.  Any worsening shortness of breath, significant palpitations, syncope, chest pain, neurologic changes, or worsening headache or other concerning symptoms should prompt emergency room visit.    Discussion:    1.  From a congenital heart standpoint he looks great.  The pulmonary valve is working well.  2.  His blood pressure isn't perfect, but it is significantly improved compared to last year, and there is still a lot of room to improve compliance.  It seems impossible to suspect he will take hydralazine TID - BID meds is the most I can hope for.    3.  I will see him again with an echo in 6 months.       Interval history:  Overall, he has done well since I last saw him in clinic.  No chest pain or palpitations.  No syncope or near-syncope.  No edema.  No worsening shortness of breath.  He reports good compliance with his medication.  He works a very physical job, outside in construction.  He has no problems keeping up with his peers.      He is ONLY TAKING HIS MEDS ONCE A DAY!  We discussed this at length.    Recent past medical history:    I saw him in clinic on April 8, 2021 after not seeing him for 4 years.  He had been in group home.  In my clinic, he was significantly hypertensive.  While he was in group home, it looks like he had significant hypertension.  In addition to his normal lisinopril and carvedilol, he was placed on hydralazine and amlodipine.  He was also on a statin.  When he got out of group home 2 months prior to that visit with, he went back to only taking his original medications including the carvedilol and the lisinopril.  Had not been taking the amlodipine or the hydralazine.  Despite restarting his hydralazine and his amlodipine, he continued to be quite hypertensive.  I discussed his case with Dr. Arrington, who recommend transitioning to Entresto given the decreased left ventricular function.  When I started the Entresto, I purposely did not restart his spironolactone due to concerns about affecting potassium levels.  He was supposed to be on that medication, but he had been noncompliant with it.  No noted side effects from spironolactone.    The review of systems is as noted above. It is otherwise negative for other symptoms related to the general, neurological, psychiatric, endocrine, gastrointestinal, genitourinary, respiratory, dermatologic, musculoskeletal, hematologic, and immunologic systems.    Past Medical History:   Diagnosis Date    Aortic insufficiency     trace    Homograft cardiac valve stenosis     mild    Hypertension     Left ventricular dysfunction     Pulmonary artery  hypertension     RV hypertension    Right ventricular dilation     S/P TOF (tetralogy of Fallot) repair 2014    TOF (tetralogy of Fallot)      Past Surgical History:   Procedure Laterality Date    balloon dilation of pulmonary atrery homograft      CARDIAC VALVE SURGERY      7/23/15 @ [facility]    epicardial pacing wires      LV    pulmonary valve replacement  2006    TETRALOGY OF FALLOT REPAIR       Family History   Problem Relation Age of Onset    Hypertension Mother     No Known Problems Father     No Known Problems Sister     No Known Problems Brother     No Known Problems Maternal Grandmother     No Known Problems Maternal Grandfather     No Known Problems Paternal Grandmother     No Known Problems Paternal Grandfather     No Known Problems Maternal Aunt     No Known Problems Maternal Uncle     No Known Problems Paternal Aunt     No Known Problems Paternal Uncle     Anemia Neg Hx     Arrhythmia Neg Hx     Asthma Neg Hx     Clotting disorder Neg Hx     Fainting Neg Hx     Heart attack Neg Hx     Heart disease Neg Hx     Heart failure Neg Hx     Hyperlipidemia Neg Hx     Stroke Neg Hx     Atrial Septal Defect Neg Hx      Social History     Socioeconomic History    Marital status: Single    Years of education: 10   Tobacco Use    Smoking status: Former     Types: Cigarettes     Start date: 2006     Quit date: 2015     Years since quittin.0    Smokeless tobacco: Never    Tobacco comments:     marijuana   Substance and Sexual Activity    Alcohol use: No     Alcohol/week: 0.0 standard drinks    Drug use: Yes     Frequency: 7.0 times per week     Types: Marijuana    Sexual activity: Yes     Partners: Female   Social History Narrative    Lives at home with mother at this time.  He is uninsured and not working.     Current Outpatient Medications on File Prior to Visit   Medication Sig Dispense Refill    amLODIPine (NORVASC) 10 MG tablet Take 1 tablet (10 mg total) by mouth once daily. 30  "tablet 11    aspirin 81 MG Chew Take 1 tablet (81 mg total) by mouth once daily. 30 tablet 11    carvediloL (COREG) 25 MG tablet Take 1 tablet (25 mg total) by mouth 2 (two) times daily. (Patient taking differently: Take 25 mg by mouth 2 (two) times daily. Patient only taking once a day) 60 tablet 11    digoxin (LANOXIN) 125 mcg tablet Take 1 tablet (125 mcg total) by mouth once daily. 30 tablet 11    hydrALAZINE (APRESOLINE) 50 MG tablet Take 1 tablet (50 mg total) by mouth 3 (three) times daily. (Patient taking differently: Take 50 mg by mouth 3 (three) times daily. Patient taking once a day) 90 tablet 11    ibuprofen (ADVIL,MOTRIN) 800 MG tablet Take 800 mg by mouth every 6 (six) hours as needed.      rosuvastatin (CRESTOR) 5 MG tablet Take 1 tablet (5 mg total) by mouth once daily. 90 tablet 3    sacubitriL-valsartan (ENTRESTO)  mg per tablet Take 1 tablet by mouth 2 (two) times daily. 60 tablet 11    spironolactone (ALDACTONE) 25 MG tablet Take 1 tablet (25 mg total) by mouth once daily. 30 tablet 11     No current facility-administered medications on file prior to visit.       No Known Allergies    BP (!) 152/95 (BP Location: Left arm)   Pulse 72   Ht 5' 6" (1.676 m)   Wt 81.7 kg (180 lb 1.9 oz)   SpO2 99%   BMI 29.07 kg/m²    Vitals:    07/06/23 0912 07/06/23 0916   BP: (!) 164/97 (!) 152/95   BP Location: Right arm Left arm   Pulse: 72    SpO2: 99%    Weight: 81.7 kg (180 lb 1.9 oz)    Height: 5' 6" (1.676 m)        In general, he is noticeably thinner but otherwise healthy appearing, nondysmorphic-appearing, black male in no apparent distress. The eyes, nares, and oropharynx are clear.  The neck is supple without thyroid enlargement.  There is very mild jugular venous distention.  The lungs are clear to auscultation bilaterally. There is no wheezing.  There are no rales.  A well-healed median sternotomy incision is noted.  The first heart sound is normal.  A grade 1/6 systolic ejection murmur is " best auscultated at the upper left sternal border.  There are no diastolic murmurs or gallops.  The second heart sound is fixed and split.  The abdominal exam is benign without hepatosplenomegaly, tenderness, or distention.  Pulses are normal in all extremities except the right hand where it is mildly decreased.  There is no clubbing, cyanosis, or edema.  No rashes are noted.  He does have a tattoo on the right arm.    I personally reviewed the following tests performed today and my interpretation follows:  EKG today with normal sinus rhythm with left atrial enlargement and and a nonspecific interventricular conduction delay.    Echo 3/23/23  Echo Saline Bubble? No  IMPRESSION:  Qualitative improvement in tricuspid insufficiency and minimal improvement in LV systolic function compared previous report-  Qualitative impression of mildly dilated right atrium  No obvious signs of previously reported dilation of the tricuspid annulus.  There is trivial to mild tricuspid insufficiency.  Mild right ventricle hypertrophy with moderate dilation.  Qualitative impression mildly reduced systolic function of the inlet and apical segments of the right ventricle with large outflow tract patch.  Inadequate Doppler profile of tricuspid insufficiency to estimate right ventricular systolic pressure.  Echodensity consistent with patch repair of ventricular septal defect and malalignment of the ventricular septum with no residual shunt demonstrated.  Images consistent with Anne implant in pulmonary homograft with peak velocity <2 m/sec   No significant pulmonary insufficiency.  Normal size confluent proximal pulmonary branches  Left atrium appears normal in size but compressed by RA dilation.  Trivial mitral valve insufficiency.  Qualitative impression of mild concentric left ventricular hypertrophy.  Mildly paradoxical septal motion with decreased movement of the LV free wall, SF = 29% and EF estimated 40-45% from apical  views?  Grade I left ventricular diastolic dysfunction.  Trivial aortic valve insufficiency.  No pericardial effusion.         Latest Reference Range & Units 10/14/21 16:13 07/07/22 10:04   BNP 0 - 99 pg/mL 111 (H) 138 (H)   (H): Data is abnormally high    BMP  Lab Results   Component Value Date     03/23/2023    K 3.8 03/23/2023     03/23/2023    CO2 26 03/23/2023    BUN 12 03/23/2023    CREATININE 0.9 03/23/2023    CALCIUM 9.9 03/23/2023    ANIONGAP 10 03/23/2023    ESTGFRAFRICA >60.0 07/07/2022    EGFRNONAA >60.0 07/07/2022       Cath 7/23/15:  IMPRESSION:  1. Repaired tetralogy of Fallot with severe homograft stenosis, peak gradient 40 mmHg.  2. Right and left ventricular dysfunction and low cardiac output, normal pulmonary vascular resistance calculations.  3. Right aortic arch.  4. Right coronary artery from left sinus of Valsalva.  5. RV to PA conduit dilation with Aristes 16, 18, 20 and 22 mm balloons.  6. RV to pulmonary artery conduit stent with Palmaz [id number] on 22 x 4 BIB (x3), postdilated with Reina 22 mm balloon (16 atmospheres).  7. Anne valve implantation on 22-Chadian Ensemble, residual gradient 9 mmHg, trivial insufficiency.    Sincerely,        Ino Corona MD  Pediatric Cardiology  Adult Congenital Heart Disease  Pediatric Heart Failure and Transplantation  [facility] [address] [place]  [address]  [phone]

## 2023-07-06 NOTE — LETTER
July 6, 2023    Thomas Horvath  [address] [facility] [place] [address]             [facility] [address]  Cardiology  [address]  [facility] [address]  Phone: [phone]   July 6, 2023     Patient: Thomas Horvath   YOB: 1990   Date of Visit: 7/6/2023       To Whom it May Concern:    Thomas Horvath was seen in my clinic on 7/6/2023. He may return to work on 7/7/23.    Please excuse him from any classes or work missed.    If you have any questions or concerns, please don't hesitate to call.    Sincerely,         Ino Corona MD

## 2024-01-03 ENCOUNTER — PATIENT MESSAGE (OUTPATIENT)
Dept: CARDIOLOGY | Facility: CLINIC | Age: 34
End: 2024-01-03
Payer: MEDICAID

## 2024-01-03 DIAGNOSIS — I42.0 DILATED CARDIOMYOPATHY: ICD-10-CM

## 2024-01-03 DIAGNOSIS — Q21.3 TOF (TETRALOGY OF FALLOT): ICD-10-CM

## 2024-01-03 DIAGNOSIS — Z87.74 S/P TOF (TETRALOGY OF FALLOT) REPAIR: Primary | Chronic | ICD-10-CM

## 2024-01-03 DIAGNOSIS — Q25.47 RIGHT AORTIC ARCH: ICD-10-CM

## 2024-01-03 DIAGNOSIS — I37.0 PULMONARY VALVE STENOSIS, UNSPECIFIED ETIOLOGY: ICD-10-CM

## 2024-01-03 DIAGNOSIS — Z95.4 S/P TRANSCATHETER REPLACEMENT OF PULMONARY VALVE: ICD-10-CM

## 2024-03-05 ENCOUNTER — TELEPHONE (OUTPATIENT)
Dept: CARDIOLOGY | Facility: CLINIC | Age: 34
End: 2024-03-05
Payer: MEDICAID

## 2024-03-05 DIAGNOSIS — I50.20 HFREF (HEART FAILURE WITH REDUCED EJECTION FRACTION): ICD-10-CM

## 2024-03-05 DIAGNOSIS — I42.0 DILATED CARDIOMYOPATHY: ICD-10-CM

## 2024-03-05 DIAGNOSIS — Z87.74 S/P TOF (TETRALOGY OF FALLOT) REPAIR: Primary | Chronic | ICD-10-CM

## 2024-03-05 DIAGNOSIS — Q25.47 RIGHT AORTIC ARCH: ICD-10-CM

## 2024-03-05 DIAGNOSIS — I35.1 NONRHEUMATIC AORTIC VALVE INSUFFICIENCY: ICD-10-CM

## 2024-03-05 NOTE — TELEPHONE ENCOUNTER
Appt moved to 5/16 start time 12:30PM. Patient verbalized understanding all information provided   ----- Message from Rosalia Mcdonald sent at 3/5/2024  9:42 AM CST -----  Patient has an appt on 3/7 he called to reschedule         Patient [phone]        Thank you  Scheduling

## 2024-04-30 ENCOUNTER — HOSPITAL ENCOUNTER (INPATIENT)
Facility: HOSPITAL | Age: 34
LOS: 7 days | Discharge: HOME | DRG: 281 | End: 2024-05-07
Attending: EMERGENCY MEDICINE | Admitting: INTERNAL MEDICINE
Payer: MEDICAID

## 2024-04-30 ENCOUNTER — TELEPHONE (OUTPATIENT)
Dept: PEDIATRIC CARDIOLOGY | Facility: HOSPITAL | Age: 34
End: 2024-04-30
Payer: MEDICAID

## 2024-04-30 DIAGNOSIS — R07.9 CHEST PAIN: ICD-10-CM

## 2024-04-30 DIAGNOSIS — I10 ESSENTIAL HYPERTENSION: ICD-10-CM

## 2024-04-30 DIAGNOSIS — I21.4 NSTEMI (NON-ST ELEVATED MYOCARDIAL INFARCTION): ICD-10-CM

## 2024-04-30 DIAGNOSIS — Z87.74 S/P TOF (TETRALOGY OF FALLOT) REPAIR: Primary | Chronic | ICD-10-CM

## 2024-04-30 LAB
ALBUMIN SERPL BCP-MCNC: 3.8 G/DL (ref 3.5–5.2)
ALP SERPL-CCNC: 92 U/L (ref 55–135)
ALT SERPL W/O P-5'-P-CCNC: 20 U/L (ref 10–44)
ANION GAP SERPL CALC-SCNC: 11 MMOL/L (ref 8–16)
APTT PPP: 30.2 SEC (ref 21–32)
AST SERPL-CCNC: 28 U/L (ref 10–40)
BASOPHILS # BLD AUTO: 0.08 K/UL (ref 0–0.2)
BASOPHILS NFR BLD: 0.7 % (ref 0–1.9)
BILIRUB SERPL-MCNC: 1.5 MG/DL (ref 0.1–1)
BNP SERPL-MCNC: 659 PG/ML (ref 0–99)
BUN SERPL-MCNC: 13 MG/DL (ref 6–20)
CALCIUM SERPL-MCNC: 10 MG/DL (ref 8.7–10.5)
CHLORIDE SERPL-SCNC: 105 MMOL/L (ref 95–110)
CHOLEST SERPL-MCNC: 158 MG/DL (ref 120–199)
CHOLEST/HDLC SERPL: 4.3 {RATIO} (ref 2–5)
CO2 SERPL-SCNC: 21 MMOL/L (ref 23–29)
CREAT SERPL-MCNC: 1.3 MG/DL (ref 0.5–1.4)
DIFFERENTIAL METHOD BLD: ABNORMAL
EOSINOPHIL # BLD AUTO: 0.4 K/UL (ref 0–0.5)
EOSINOPHIL NFR BLD: 3.3 % (ref 0–8)
ERYTHROCYTE [DISTWIDTH] IN BLOOD BY AUTOMATED COUNT: 13.8 % (ref 11.5–14.5)
EST. GFR  (NO RACE VARIABLE): >60 ML/MIN/1.73 M^2
GLUCOSE SERPL-MCNC: 122 MG/DL (ref 70–110)
HCT VFR BLD AUTO: 52.4 % (ref 40–54)
HCV AB SERPL QL IA: NORMAL
HDLC SERPL-MCNC: 37 MG/DL (ref 40–75)
HDLC SERPL: 23.4 % (ref 20–50)
HGB BLD-MCNC: 16.7 G/DL (ref 14–18)
HIV 1+2 AB+HIV1 P24 AG SERPL QL IA: NORMAL
IMM GRANULOCYTES # BLD AUTO: 0.02 K/UL (ref 0–0.04)
IMM GRANULOCYTES NFR BLD AUTO: 0.2 % (ref 0–0.5)
INR PPP: 1.2 (ref 0.8–1.2)
LDLC SERPL CALC-MCNC: 104.2 MG/DL (ref 63–159)
LYMPHOCYTES # BLD AUTO: 1.3 K/UL (ref 1–4.8)
LYMPHOCYTES NFR BLD: 11 % (ref 18–48)
MCH RBC QN AUTO: 27.6 PG (ref 27–31)
MCHC RBC AUTO-ENTMCNC: 31.9 G/DL (ref 32–36)
MCV RBC AUTO: 87 FL (ref 82–98)
MONOCYTES # BLD AUTO: 0.7 K/UL (ref 0.3–1)
MONOCYTES NFR BLD: 5.9 % (ref 4–15)
NEUTROPHILS # BLD AUTO: 9.1 K/UL (ref 1.8–7.7)
NEUTROPHILS NFR BLD: 78.9 % (ref 38–73)
NONHDLC SERPL-MCNC: 121 MG/DL
NRBC BLD-RTO: 0 /100 WBC
PLATELET # BLD AUTO: 267 K/UL (ref 150–450)
PMV BLD AUTO: 10.4 FL (ref 9.2–12.9)
POTASSIUM SERPL-SCNC: 4 MMOL/L (ref 3.5–5.1)
PROT SERPL-MCNC: 7.9 G/DL (ref 6–8.4)
PROTHROMBIN TIME: 12.9 SEC (ref 9–12.5)
RBC # BLD AUTO: 6.05 M/UL (ref 4.6–6.2)
SODIUM SERPL-SCNC: 137 MMOL/L (ref 136–145)
TRIGL SERPL-MCNC: 84 MG/DL (ref 30–150)
TROPONIN I SERPL DL<=0.01 NG/ML-MCNC: 2.1 NG/ML (ref 0–0.03)
TROPONIN I SERPL DL<=0.01 NG/ML-MCNC: 2.12 NG/ML (ref 0–0.03)
WBC # BLD AUTO: 11.48 K/UL (ref 3.9–12.7)

## 2024-04-30 PROCEDURE — 96365 THER/PROPH/DIAG IV INF INIT: CPT

## 2024-04-30 PROCEDURE — 25000242 PHARM REV CODE 250 ALT 637 W/ HCPCS

## 2024-04-30 PROCEDURE — 86803 HEPATITIS C AB TEST: CPT | Performed by: PHYSICIAN ASSISTANT

## 2024-04-30 PROCEDURE — 85025 COMPLETE CBC W/AUTO DIFF WBC: CPT | Performed by: STUDENT IN AN ORGANIZED HEALTH CARE EDUCATION/TRAINING PROGRAM

## 2024-04-30 PROCEDURE — 25000003 PHARM REV CODE 250

## 2024-04-30 PROCEDURE — 84484 ASSAY OF TROPONIN QUANT: CPT | Performed by: STUDENT IN AN ORGANIZED HEALTH CARE EDUCATION/TRAINING PROGRAM

## 2024-04-30 PROCEDURE — 99285 EMERGENCY DEPT VISIT HI MDM: CPT | Mod: 25

## 2024-04-30 PROCEDURE — 93005 ELECTROCARDIOGRAM TRACING: CPT

## 2024-04-30 PROCEDURE — 63600175 PHARM REV CODE 636 W HCPCS

## 2024-04-30 PROCEDURE — 83880 ASSAY OF NATRIURETIC PEPTIDE: CPT | Performed by: STUDENT IN AN ORGANIZED HEALTH CARE EDUCATION/TRAINING PROGRAM

## 2024-04-30 PROCEDURE — 12000002 HC ACUTE/MED SURGE SEMI-PRIVATE ROOM

## 2024-04-30 PROCEDURE — 80061 LIPID PANEL: CPT

## 2024-04-30 PROCEDURE — 93010 ELECTROCARDIOGRAM REPORT: CPT | Mod: 59,,, | Performed by: INTERNAL MEDICINE

## 2024-04-30 PROCEDURE — 80053 COMPREHEN METABOLIC PANEL: CPT | Performed by: STUDENT IN AN ORGANIZED HEALTH CARE EDUCATION/TRAINING PROGRAM

## 2024-04-30 PROCEDURE — 85730 THROMBOPLASTIN TIME PARTIAL: CPT

## 2024-04-30 PROCEDURE — 85610 PROTHROMBIN TIME: CPT

## 2024-04-30 PROCEDURE — 93010 ELECTROCARDIOGRAM REPORT: CPT | Mod: ,,, | Performed by: INTERNAL MEDICINE

## 2024-04-30 PROCEDURE — 87389 HIV-1 AG W/HIV-1&-2 AB AG IA: CPT | Performed by: PHYSICIAN ASSISTANT

## 2024-04-30 PROCEDURE — 96375 TX/PRO/DX INJ NEW DRUG ADDON: CPT

## 2024-04-30 RX ORDER — ATORVASTATIN CALCIUM 40 MG/1
80 TABLET, FILM COATED ORAL ONCE
Status: COMPLETED | OUTPATIENT
Start: 2024-04-30 | End: 2024-04-30

## 2024-04-30 RX ORDER — MORPHINE SULFATE 4 MG/ML
4 INJECTION, SOLUTION INTRAMUSCULAR; INTRAVENOUS
Status: COMPLETED | OUTPATIENT
Start: 2024-04-30 | End: 2024-04-30

## 2024-04-30 RX ORDER — NITROGLYCERIN 0.4 MG/1
0.4 TABLET SUBLINGUAL EVERY 5 MIN PRN
Status: COMPLETED | OUTPATIENT
Start: 2024-04-30 | End: 2024-05-01

## 2024-04-30 RX ORDER — HYDROMORPHONE HYDROCHLORIDE 1 MG/ML
1 INJECTION, SOLUTION INTRAMUSCULAR; INTRAVENOUS; SUBCUTANEOUS ONCE
Status: COMPLETED | OUTPATIENT
Start: 2024-04-30 | End: 2024-04-30

## 2024-04-30 RX ORDER — ASPIRIN 325 MG
325 TABLET ORAL
Status: DISCONTINUED | OUTPATIENT
Start: 2024-04-30 | End: 2024-04-30

## 2024-04-30 RX ORDER — HEPARIN SODIUM,PORCINE/D5W 25000/250
0-40 INTRAVENOUS SOLUTION INTRAVENOUS CONTINUOUS
Status: DISCONTINUED | OUTPATIENT
Start: 2024-04-30 | End: 2024-05-06

## 2024-04-30 RX ORDER — FUROSEMIDE 10 MG/ML
80 INJECTION INTRAMUSCULAR; INTRAVENOUS ONCE
Status: COMPLETED | OUTPATIENT
Start: 2024-05-01 | End: 2024-05-01

## 2024-04-30 RX ORDER — METOPROLOL TARTRATE 50 MG/1
50 TABLET ORAL ONCE
Status: COMPLETED | OUTPATIENT
Start: 2024-04-30 | End: 2024-04-30

## 2024-04-30 RX ORDER — SODIUM CHLORIDE 0.9 % (FLUSH) 0.9 %
10 SYRINGE (ML) INJECTION
Status: DISCONTINUED | OUTPATIENT
Start: 2024-05-01 | End: 2024-05-07 | Stop reason: HOSPADM

## 2024-04-30 RX ADMIN — ATORVASTATIN CALCIUM 80 MG: 40 TABLET, FILM COATED ORAL at 10:04

## 2024-04-30 RX ADMIN — METOPROLOL TARTRATE 50 MG: 50 TABLET, FILM COATED ORAL at 09:04

## 2024-04-30 RX ADMIN — MORPHINE SULFATE 4 MG: 4 INJECTION INTRAVENOUS at 08:04

## 2024-04-30 RX ADMIN — HEPARIN SODIUM AND DEXTROSE 12 UNITS/KG/HR: 10000; 5 INJECTION INTRAVENOUS at 10:04

## 2024-04-30 RX ADMIN — NITROGLYCERIN 0.4 MG: 0.4 TABLET, ORALLY DISINTEGRATING SUBLINGUAL at 10:04

## 2024-04-30 RX ADMIN — HYDROMORPHONE HYDROCHLORIDE 1 MG: 1 INJECTION, SOLUTION INTRAMUSCULAR; INTRAVENOUS; SUBCUTANEOUS at 10:04

## 2024-04-30 NOTE — LETTER
May 7, 2024         [address]  [facility] [address]  Phone: [phone]  Fax: [phone]       Patient: Thomas Horvath   YOB: 1990  Date of Visit: 05/07/2024    To Whom It May Concern:    Antoine Horvath  was at [facility] on 05/07/2024. Please excuse Mr.  The patient may return to work/school on *** {With/no:15510} restrictions. If you have any questions or concerns, or if I can be of further assistance, please do not hesitate to contact me.    Sincerely,    Jesus Caputo MD

## 2024-04-30 NOTE — Clinical Note
37 ml of contrast were injected throughout the case. 163 mL of contrast was the total wasted during the case. 200 mL was the total amount used during the case.

## 2024-04-30 NOTE — TELEPHONE ENCOUNTER
Called by the transfer center about this patient.  To summarize, diagnoses are as follows:   1. 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   - epicardial left ventricular pacing wires in place.   3. 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 Mild pulmonary artery hypertension noted in the past.   5. Trace aortic insufficiency and mild aortic root enlargement  6. Long history of decreased LV function - possibly exacerbated by high afterload.   - mildly improved with ejection fraction around 40-45% on echocardiogram 2023  7. Hypertension - poor compliance in the past with medications, still an issue  Strong family history of hypertension.    I last saw him July 2023.  An echocardiogram in March 2023 looked good although his ejection fraction was around 40-45%, actually improved compared to previous studies.  Blood pressure was again significantly elevated, and it was clear that his compliance with medications was suboptimal.  The plan was to see him back in 6 months, and I stressed the importance of medication compliance.    We were called by an ER in [place].  Patient presented today with chest pain that started yesterday.  Troponin was elevated at 1.85 with their upper limit of normal 0.12.  BNP was significantly elevated 827.  Our most recent BNP a year ago was normal although he has had mild elevation in the past.  Patient was given aspirin and sublingual nitroglycerin, and chest pain improved.  EKG, by report, unchanged with right bundle branch block, left axis deviation.    Discussed with Dr. Beatris Thompson (HTS service) and with Dr. Mckeon in Interventional Cardiology.  Recommendation was for transfer to our emergency room where the patient can be evaluated by Interventional Cardiology to see if we  think there is a need for emergent catheterization given chest pain and elevated troponin.  If not, can be admitted to the HTS service for further evaluation of heart failure.    No history of fever, and by report CBC does not suggest acute infection, but he is definitely at risk for endocarditis given his Anne valve in the pulmonary position.

## 2024-05-01 PROBLEM — I21.4 NSTEMI (NON-ST ELEVATED MYOCARDIAL INFARCTION): Status: ACTIVE | Noted: 2024-05-01

## 2024-05-01 LAB
ANION GAP SERPL CALC-SCNC: 10 MMOL/L (ref 8–16)
ANION GAP SERPL CALC-SCNC: 10 MMOL/L (ref 8–16)
APTT PPP: 30.8 SEC (ref 21–32)
APTT PPP: 31.2 SEC (ref 21–32)
APTT PPP: 41.9 SEC (ref 21–32)
ASCENDING AORTA: 4.04 CM
AV INDEX (PROSTH): 0.83
AV INDEX (PROSTH): 1.32
AV MEAN GRADIENT: 1 MMHG
AV MEAN GRADIENT: 1 MMHG
AV PEAK GRADIENT: 1 MMHG
AV PEAK GRADIENT: 2 MMHG
AV VALVE AREA BY VELOCITY RATIO: 4.17 CM²
AV VALVE AREA BY VELOCITY RATIO: 4.2 CM²
AV VALVE AREA: 5.47 CM²
AV VALVE AREA: 6.41 CM²
AV VELOCITY RATIO: 0.64
AV VELOCITY RATIO: 0.86
BASOPHILS # BLD AUTO: 0.05 K/UL (ref 0–0.2)
BASOPHILS NFR BLD: 0.5 % (ref 0–1.9)
BSA FOR ECHO PROCEDURE: 2.14 M2
BSA FOR ECHO PROCEDURE: 2.14 M2
BUN SERPL-MCNC: 13 MG/DL (ref 6–20)
BUN SERPL-MCNC: 27 MG/DL (ref 6–20)
CALCIUM SERPL-MCNC: 7.8 MG/DL (ref 8.7–10.5)
CALCIUM SERPL-MCNC: 9.3 MG/DL (ref 8.7–10.5)
CHLORIDE SERPL-SCNC: 102 MMOL/L (ref 95–110)
CHLORIDE SERPL-SCNC: 110 MMOL/L (ref 95–110)
CO2 SERPL-SCNC: 20 MMOL/L (ref 23–29)
CO2 SERPL-SCNC: 24 MMOL/L (ref 23–29)
CREAT SERPL-MCNC: 0.9 MG/DL (ref 0.5–1.4)
CREAT SERPL-MCNC: 1.5 MG/DL (ref 0.5–1.4)
CV ECHO LV RWT: 0.39 CM
CV ECHO LV RWT: 0.5 CM
DIFFERENTIAL METHOD BLD: ABNORMAL
DOP CALC AO PEAK VEL: 0.51 M/S
DOP CALC AO PEAK VEL: 0.66 M/S
DOP CALC AO VTI: 7.24 CM
DOP CALC AO VTI: 9.67 CM
DOP CALC LVOT AREA: 4.9 CM2
DOP CALC LVOT AREA: 6.6 CM2
DOP CALC LVOT DIAMETER: 2.49 CM
DOP CALC LVOT DIAMETER: 2.89 CM
DOP CALC LVOT PEAK VEL: 0.42 M/S
DOP CALC LVOT PEAK VEL: 0.44 M/S
DOP CALC LVOT STROKE VOLUME: 46.43 CM3
DOP CALC LVOT STROKE VOLUME: 52.91 CM3
DOP CALC RVOT PEAK VEL: 0.89 M/S
DOP CALC RVOT VTI: 15.44 CM
DOP CALCLVOT PEAK VEL VTI: 8.07 CM
DOP CALCLVOT PEAK VEL VTI: 9.54 CM
E WAVE DECELERATION TIME: 122.56 MSEC
E/A RATIO: 1.29
E/E' RATIO: 11 M/S
E/E' RATIO: 21.43 M/S
ECHO LV POSTERIOR WALL: 1.07 CM (ref 0.6–1.1)
ECHO LV POSTERIOR WALL: 1.3 CM (ref 0.6–1.1)
EJECTION FRACTION: 18 %
EOSINOPHIL # BLD AUTO: 0.6 K/UL (ref 0–0.5)
EOSINOPHIL NFR BLD: 6 % (ref 0–8)
ERYTHROCYTE [DISTWIDTH] IN BLOOD BY AUTOMATED COUNT: 13.7 % (ref 11.5–14.5)
EST. GFR  (NO RACE VARIABLE): >60 ML/MIN/1.73 M^2
EST. GFR  (NO RACE VARIABLE): >60 ML/MIN/1.73 M^2
FRACTIONAL SHORTENING: 16 % (ref 28–44)
FRACTIONAL SHORTENING: 22 % (ref 28–44)
GLUCOSE SERPL-MCNC: 117 MG/DL (ref 70–110)
GLUCOSE SERPL-MCNC: 84 MG/DL (ref 70–110)
HCT VFR BLD AUTO: 49.4 % (ref 40–54)
HGB BLD-MCNC: 16.2 G/DL (ref 14–18)
IMM GRANULOCYTES # BLD AUTO: 0.04 K/UL (ref 0–0.04)
IMM GRANULOCYTES NFR BLD AUTO: 0.4 % (ref 0–0.5)
INTERVENTRICULAR SEPTUM: 0.91 CM (ref 0.6–1.1)
INTERVENTRICULAR SEPTUM: 1.13 CM (ref 0.6–1.1)
LA MAJOR: 5.4 CM
LA MAJOR: 5.8 CM
LA MINOR: 5.54 CM
LA MINOR: 5.8 CM
LA WIDTH: 2.78 CM
LA WIDTH: 2.86 CM
LACTATE SERPL-SCNC: 1 MMOL/L (ref 0.5–2.2)
LACTATE SERPL-SCNC: 1.9 MMOL/L (ref 0.5–2.2)
LEFT ATRIUM SIZE: 3.06 CM
LEFT ATRIUM SIZE: 4.14 CM
LEFT ATRIUM VOLUME INDEX MOD: 12.6 ML/M2
LEFT ATRIUM VOLUME INDEX MOD: 17 ML/M2
LEFT ATRIUM VOLUME INDEX: 20.8 ML/M2
LEFT ATRIUM VOLUME INDEX: 25.8 ML/M2
LEFT ATRIUM VOLUME MOD: 26.04 CM3
LEFT ATRIUM VOLUME MOD: 35.18 CM3
LEFT ATRIUM VOLUME: 43.15 CM3
LEFT ATRIUM VOLUME: 53.5 CM3
LEFT INTERNAL DIMENSION IN SYSTOLE: 4.25 CM (ref 2.1–4)
LEFT INTERNAL DIMENSION IN SYSTOLE: 4.37 CM (ref 2.1–4)
LEFT VENTRICLE DIASTOLIC VOLUME INDEX: 62.05 ML/M2
LEFT VENTRICLE DIASTOLIC VOLUME INDEX: 70.75 ML/M2
LEFT VENTRICLE DIASTOLIC VOLUME: 128.45 ML
LEFT VENTRICLE DIASTOLIC VOLUME: 146.46 ML
LEFT VENTRICLE MASS INDEX: 101 G/M2
LEFT VENTRICLE MASS INDEX: 122 G/M2
LEFT VENTRICLE SYSTOLIC VOLUME INDEX: 39 ML/M2
LEFT VENTRICLE SYSTOLIC VOLUME INDEX: 41.7 ML/M2
LEFT VENTRICLE SYSTOLIC VOLUME: 80.68 ML
LEFT VENTRICLE SYSTOLIC VOLUME: 86.41 ML
LEFT VENTRICULAR INTERNAL DIMENSION IN DIASTOLE: 5.18 CM (ref 3.5–6)
LEFT VENTRICULAR INTERNAL DIMENSION IN DIASTOLE: 5.48 CM (ref 3.5–6)
LEFT VENTRICULAR MASS: 209.09 G
LEFT VENTRICULAR MASS: 251.63 G
LV LATERAL E/E' RATIO: 11 M/S
LV LATERAL E/E' RATIO: 18.75 M/S
LV SEPTAL E/E' RATIO: 11 M/S
LV SEPTAL E/E' RATIO: 25 M/S
LYMPHOCYTES # BLD AUTO: 1.6 K/UL (ref 1–4.8)
LYMPHOCYTES NFR BLD: 15.7 % (ref 18–48)
MAGNESIUM SERPL-MCNC: 1.4 MG/DL (ref 1.6–2.6)
MCH RBC QN AUTO: 27.6 PG (ref 27–31)
MCHC RBC AUTO-ENTMCNC: 32.8 G/DL (ref 32–36)
MCV RBC AUTO: 84 FL (ref 82–98)
MONOCYTES # BLD AUTO: 1 K/UL (ref 0.3–1)
MONOCYTES NFR BLD: 10.5 % (ref 4–15)
MV A" WAVE DURATION": 7.14 MSEC
MV PEAK A VEL: 0.34 M/S
MV PEAK E VEL: 0.44 M/S
MV PEAK E VEL: 0.75 M/S
MV STENOSIS PRESSURE HALF TIME: 35.54 MS
MV VALVE AREA P 1/2 METHOD: 6.19 CM2
NEUTROPHILS # BLD AUTO: 6.6 K/UL (ref 1.8–7.7)
NEUTROPHILS NFR BLD: 66.9 % (ref 38–73)
NRBC BLD-RTO: 0 /100 WBC
OHS CV RV/LV RATIO: 0.63 CM
OHS CV RV/LV RATIO: 0.91 CM
OHS QRS DURATION: 166 MS
OHS QRS DURATION: 180 MS
OHS QTC CALCULATION: 535 MS
OHS QTC CALCULATION: 553 MS
PHOSPHATE SERPL-MCNC: 3.2 MG/DL (ref 2.7–4.5)
PISA TR MAX VEL: 2.22 M/S
PLATELET # BLD AUTO: 260 K/UL (ref 150–450)
PMV BLD AUTO: 10.7 FL (ref 9.2–12.9)
POTASSIUM SERPL-SCNC: 3.1 MMOL/L (ref 3.5–5.1)
POTASSIUM SERPL-SCNC: 4.7 MMOL/L (ref 3.5–5.1)
PULM VEIN S/D RATIO: 1.32
PV MEAN GRADIENT: 2 MMHG
PV PEAK D VEL: 0.28 M/S
PV PEAK GRADIENT: 9
PV PEAK S VEL: 0.37 M/S
PV PEAK VELOCITY: 1.54 M/S
RA MAJOR: 6.42 CM
RA MAJOR: 6.72 CM
RA PRESSURE ESTIMATED: 8 MMHG
RA WIDTH: 3.76 CM
RA WIDTH: 5.23 CM
RBC # BLD AUTO: 5.86 M/UL (ref 4.6–6.2)
RIGHT VENTRICULAR END-DIASTOLIC DIMENSION: 3.43 CM
RIGHT VENTRICULAR END-DIASTOLIC DIMENSION: 4.72 CM
SINUS: 3.75 CM
SINUS: 4.12 CM
SODIUM SERPL-SCNC: 136 MMOL/L (ref 136–145)
SODIUM SERPL-SCNC: 140 MMOL/L (ref 136–145)
STJ: 3.3 CM
TDI LATERAL: 0.04 M/S
TDI LATERAL: 0.04 M/S
TDI SEPTAL: 0.03 M/S
TDI SEPTAL: 0.04 M/S
TDI: 0.04 M/S
TDI: 0.04 M/S
TR MAX PG: 20 MMHG
TRICUSPID ANNULAR PLANE SYSTOLIC EXCURSION: 1.23 CM
TRICUSPID ANNULAR PLANE SYSTOLIC EXCURSION: 1.55 CM
TROPONIN I SERPL DL<=0.01 NG/ML-MCNC: 2.68 NG/ML (ref 0–0.03)
TROPONIN I SERPL DL<=0.01 NG/ML-MCNC: 3.37 NG/ML (ref 0–0.03)
TROPONIN I SERPL DL<=0.01 NG/ML-MCNC: 3.8 NG/ML (ref 0–0.03)
WBC # BLD AUTO: 9.86 K/UL (ref 3.9–12.7)
Z-SCORE OF LEFT VENTRICULAR DIMENSION IN END DIASTOLE: -1.38
Z-SCORE OF LEFT VENTRICULAR DIMENSION IN END DIASTOLE: -1.95
Z-SCORE OF LEFT VENTRICULAR DIMENSION IN END SYSTOLE: 0.78
Z-SCORE OF LEFT VENTRICULAR DIMENSION IN END SYSTOLE: 1

## 2024-05-01 PROCEDURE — 84484 ASSAY OF TROPONIN QUANT: CPT | Performed by: STUDENT IN AN ORGANIZED HEALTH CARE EDUCATION/TRAINING PROGRAM

## 2024-05-01 PROCEDURE — 63600175 PHARM REV CODE 636 W HCPCS: Performed by: STUDENT IN AN ORGANIZED HEALTH CARE EDUCATION/TRAINING PROGRAM

## 2024-05-01 PROCEDURE — 85730 THROMBOPLASTIN TIME PARTIAL: CPT | Mod: 91 | Performed by: INTERNAL MEDICINE

## 2024-05-01 PROCEDURE — 84100 ASSAY OF PHOSPHORUS: CPT | Performed by: STUDENT IN AN ORGANIZED HEALTH CARE EDUCATION/TRAINING PROGRAM

## 2024-05-01 PROCEDURE — 25000003 PHARM REV CODE 250: Performed by: STUDENT IN AN ORGANIZED HEALTH CARE EDUCATION/TRAINING PROGRAM

## 2024-05-01 PROCEDURE — 83605 ASSAY OF LACTIC ACID: CPT | Performed by: STUDENT IN AN ORGANIZED HEALTH CARE EDUCATION/TRAINING PROGRAM

## 2024-05-01 PROCEDURE — 83605 ASSAY OF LACTIC ACID: CPT | Mod: 91 | Performed by: STUDENT IN AN ORGANIZED HEALTH CARE EDUCATION/TRAINING PROGRAM

## 2024-05-01 PROCEDURE — 80048 BASIC METABOLIC PNL TOTAL CA: CPT | Mod: 91 | Performed by: STUDENT IN AN ORGANIZED HEALTH CARE EDUCATION/TRAINING PROGRAM

## 2024-05-01 PROCEDURE — 85025 COMPLETE CBC W/AUTO DIFF WBC: CPT | Performed by: STUDENT IN AN ORGANIZED HEALTH CARE EDUCATION/TRAINING PROGRAM

## 2024-05-01 PROCEDURE — 93010 ELECTROCARDIOGRAM REPORT: CPT | Mod: ,,, | Performed by: INTERNAL MEDICINE

## 2024-05-01 PROCEDURE — 93005 ELECTROCARDIOGRAM TRACING: CPT

## 2024-05-01 PROCEDURE — 94761 N-INVAS EAR/PLS OXIMETRY MLT: CPT

## 2024-05-01 PROCEDURE — 25000242 PHARM REV CODE 250 ALT 637 W/ HCPCS

## 2024-05-01 PROCEDURE — 84484 ASSAY OF TROPONIN QUANT: CPT | Mod: 91 | Performed by: STUDENT IN AN ORGANIZED HEALTH CARE EDUCATION/TRAINING PROGRAM

## 2024-05-01 PROCEDURE — 20000000 HC ICU ROOM

## 2024-05-01 PROCEDURE — 80048 BASIC METABOLIC PNL TOTAL CA: CPT | Performed by: STUDENT IN AN ORGANIZED HEALTH CARE EDUCATION/TRAINING PROGRAM

## 2024-05-01 PROCEDURE — 83735 ASSAY OF MAGNESIUM: CPT | Performed by: STUDENT IN AN ORGANIZED HEALTH CARE EDUCATION/TRAINING PROGRAM

## 2024-05-01 PROCEDURE — 99222 1ST HOSP IP/OBS MODERATE 55: CPT | Mod: 95,,, | Performed by: PEDIATRICS

## 2024-05-01 PROCEDURE — 99900035 HC TECH TIME PER 15 MIN (STAT)

## 2024-05-01 PROCEDURE — 83880 ASSAY OF NATRIURETIC PEPTIDE: CPT | Performed by: STUDENT IN AN ORGANIZED HEALTH CARE EDUCATION/TRAINING PROGRAM

## 2024-05-01 PROCEDURE — 99291 CRITICAL CARE FIRST HOUR: CPT | Mod: ,,, | Performed by: INTERNAL MEDICINE

## 2024-05-01 RX ORDER — POTASSIUM CHLORIDE 20 MEQ/1
40 TABLET, EXTENDED RELEASE ORAL
Status: COMPLETED | OUTPATIENT
Start: 2024-05-01 | End: 2024-05-01

## 2024-05-01 RX ORDER — LIDOCAINE HYDROCHLORIDE 20 MG/ML
15 SOLUTION OROPHARYNGEAL ONCE
Status: COMPLETED | OUTPATIENT
Start: 2024-05-01 | End: 2024-05-01

## 2024-05-01 RX ORDER — ATORVASTATIN CALCIUM 20 MG/1
80 TABLET, FILM COATED ORAL NIGHTLY
Status: DISCONTINUED | OUTPATIENT
Start: 2024-05-01 | End: 2024-05-07 | Stop reason: HOSPADM

## 2024-05-01 RX ORDER — HYDRALAZINE HYDROCHLORIDE 50 MG/1
50 TABLET, FILM COATED ORAL EVERY 12 HOURS
Status: DISCONTINUED | OUTPATIENT
Start: 2024-05-01 | End: 2024-05-03

## 2024-05-01 RX ORDER — ACETAMINOPHEN 500 MG
1000 TABLET ORAL ONCE
Status: COMPLETED | OUTPATIENT
Start: 2024-05-01 | End: 2024-05-01

## 2024-05-01 RX ORDER — MAGNESIUM SULFATE HEPTAHYDRATE 40 MG/ML
2 INJECTION, SOLUTION INTRAVENOUS ONCE
Status: COMPLETED | OUTPATIENT
Start: 2024-05-01 | End: 2024-05-01

## 2024-05-01 RX ORDER — ALUMINUM HYDROXIDE, MAGNESIUM HYDROXIDE, AND SIMETHICONE 1200; 120; 1200 MG/30ML; MG/30ML; MG/30ML
30 SUSPENSION ORAL ONCE
Status: COMPLETED | OUTPATIENT
Start: 2024-05-01 | End: 2024-05-01

## 2024-05-01 RX ORDER — CARVEDILOL 25 MG/1
25 TABLET ORAL 2 TIMES DAILY
Status: DISCONTINUED | OUTPATIENT
Start: 2024-05-01 | End: 2024-05-02

## 2024-05-01 RX ORDER — TALC
9 POWDER (GRAM) TOPICAL NIGHTLY PRN
Status: DISCONTINUED | OUTPATIENT
Start: 2024-05-01 | End: 2024-05-07 | Stop reason: HOSPADM

## 2024-05-01 RX ORDER — NITROGLYCERIN 20 MG/100ML
0-400 INJECTION INTRAVENOUS CONTINUOUS
Status: DISCONTINUED | OUTPATIENT
Start: 2024-05-01 | End: 2024-05-03

## 2024-05-01 RX ORDER — AMLODIPINE BESYLATE 10 MG/1
10 TABLET ORAL DAILY
Status: DISCONTINUED | OUTPATIENT
Start: 2024-05-01 | End: 2024-05-06

## 2024-05-01 RX ORDER — FUROSEMIDE 10 MG/ML
80 INJECTION INTRAMUSCULAR; INTRAVENOUS EVERY 12 HOURS
Status: DISCONTINUED | OUTPATIENT
Start: 2024-05-01 | End: 2024-05-02

## 2024-05-01 RX ORDER — NAPROXEN SODIUM 220 MG/1
81 TABLET, FILM COATED ORAL DAILY
Status: DISCONTINUED | OUTPATIENT
Start: 2024-05-01 | End: 2024-05-07 | Stop reason: HOSPADM

## 2024-05-01 RX ADMIN — CARVEDILOL 25 MG: 25 TABLET, FILM COATED ORAL at 08:05

## 2024-05-01 RX ADMIN — HYDRALAZINE HYDROCHLORIDE 50 MG: 50 TABLET ORAL at 08:05

## 2024-05-01 RX ADMIN — SACUBITRIL AND VALSARTAN 1 TABLET: 97; 103 TABLET, FILM COATED ORAL at 08:05

## 2024-05-01 RX ADMIN — AMLODIPINE BESYLATE 10 MG: 10 TABLET ORAL at 08:05

## 2024-05-01 RX ADMIN — NITROGLYCERIN 5 MCG/MIN: 20 INJECTION INTRAVENOUS at 04:05

## 2024-05-01 RX ADMIN — MAGNESIUM SULFATE HEPTAHYDRATE 2 G: 40 INJECTION, SOLUTION INTRAVENOUS at 05:05

## 2024-05-01 RX ADMIN — NITROGLYCERIN 0.4 MG: 0.4 TABLET, ORALLY DISINTEGRATING SUBLINGUAL at 12:05

## 2024-05-01 RX ADMIN — ASPIRIN 81 MG CHEWABLE TABLET 81 MG: 81 TABLET CHEWABLE at 08:05

## 2024-05-01 RX ADMIN — POTASSIUM CHLORIDE 40 MEQ: 1500 TABLET, EXTENDED RELEASE ORAL at 07:05

## 2024-05-01 RX ADMIN — CHLOROTHIAZIDE SODIUM 250 MG: 500 INJECTION, POWDER, LYOPHILIZED, FOR SOLUTION INTRAVENOUS at 10:05

## 2024-05-01 RX ADMIN — ACETAMINOPHEN 1000 MG: 500 TABLET ORAL at 08:05

## 2024-05-01 RX ADMIN — HEPARIN SODIUM AND DEXTROSE 15 UNITS/KG/HR: 10000; 5 INJECTION INTRAVENOUS at 02:05

## 2024-05-01 RX ADMIN — LIDOCAINE HYDROCHLORIDE 15 ML: 20 SOLUTION OROPHARYNGEAL at 03:05

## 2024-05-01 RX ADMIN — POTASSIUM CHLORIDE 40 MEQ: 1500 TABLET, EXTENDED RELEASE ORAL at 05:05

## 2024-05-01 RX ADMIN — Medication 9 MG: at 03:05

## 2024-05-01 RX ADMIN — FUROSEMIDE 80 MG: 10 INJECTION, SOLUTION INTRAVENOUS at 12:05

## 2024-05-01 RX ADMIN — ATORVASTATIN CALCIUM 80 MG: 40 TABLET, FILM COATED ORAL at 08:05

## 2024-05-01 RX ADMIN — FUROSEMIDE 80 MG: 10 INJECTION, SOLUTION INTRAVENOUS at 08:05

## 2024-05-01 RX ADMIN — ALUMINUM HYDROXIDE, MAGNESIUM HYDROXIDE, AND SIMETHICONE 30 ML: 200; 200; 20 SUSPENSION ORAL at 03:05

## 2024-05-01 RX ADMIN — FUROSEMIDE 80 MG: 10 INJECTION, SOLUTION INTRAVENOUS at 10:05

## 2024-05-01 NOTE — SUBJECTIVE & OBJECTIVE
Past Medical History:   Diagnosis Date    Aortic insufficiency     trace    Homograft cardiac valve stenosis     mild    Hypertension     Left ventricular dysfunction     Pulmonary artery hypertension     RV hypertension    Right ventricular dilation     S/P TOF (tetralogy of Fallot) repair 12/18/2014    TOF (tetralogy of Fallot)        Past Surgical History:   Procedure Laterality Date    balloon dilation of pulmonary atrery homograft  2008    CARDIAC VALVE SURGERY      7/23/15 @ [facility] [place]    epicardial pacing wires      LV    pulmonary valve replacement  July 7, 2006    TETRALOGY OF FALLOT REPAIR  1990       Review of patient's allergies indicates:  No Known Allergies    Current Facility-Administered Medications   Medication Dose Route Frequency Provider Last Rate Last Admin    aluminum-magnesium hydroxide-simethicone 200-200-20 mg/5 mL suspension 30 mL  30 mL Oral Once Kyler Alberto MD        And    LIDOcaine viscous HCl 2% oral solution 15 mL  15 mL Oral Once Kyler Alberto MD        heparin 25,000 units in dextrose 5% (100 units/ml) IV bolus from bag LOW INTENSITY nomogram - [facility]  56.4 Units/kg (Adjusted) Intravenous PRN Indra Coreas MD        heparin 25,000 units in dextrose 5% (100 units/ml) IV bolus from bag LOW INTENSITY nomogram - [facility]  30 Units/kg (Adjusted) Intravenous PRN Idnra Coreas MD        heparin 25,000 units in dextrose 5% 250 mL (100 units/mL) infusion LOW INTENSITY nomogram - [facility]  0-40 Units/kg/hr (Adjusted) Intravenous Continuous Indra Coreas MD 8.5 mL/hr at 04/30/24 2212 12 Units/kg/hr at 04/30/24 2212    sodium chloride 0.9% flush 10 mL  10 mL Intravenous PRN Kingsley Stephenson MD         Current Outpatient Medications   Medication Sig Dispense Refill    amLODIPine (NORVASC) 10 MG tablet Take 1 tablet (10 mg total) by mouth once daily. 30 tablet 11    aspirin 81 MG Chew Take 1 tablet (81 mg total) by mouth once daily. 30 tablet 11    carvediloL (COREG) 25 MG tablet  Take 1 tablet (25 mg total) by mouth 2 (two) times daily. 60 tablet 11    digoxin (LANOXIN) 125 mcg tablet Take 1 tablet (125 mcg total) by mouth once daily. 30 tablet 11    hydrALAZINE (APRESOLINE) 50 MG tablet Take 1 tablet (50 mg total) by mouth every 12 (twelve) hours. 90 tablet 11    ibuprofen (ADVIL,MOTRIN) 800 MG tablet Take 800 mg by mouth every 6 (six) hours as needed.      rosuvastatin (CRESTOR) 5 MG tablet Take 1 tablet (5 mg total) by mouth once daily. 90 tablet 3    sacubitriL-valsartan (ENTRESTO)  mg per tablet Take 1 tablet by mouth 2 (two) times daily. 60 tablet 11    spironolactone (ALDACTONE) 25 MG tablet Take 1 tablet (25 mg total) by mouth once daily. 30 tablet 11     Family History       Problem Relation (Age of Onset)    Hypertension Mother    No Known Problems Father, Sister, Brother, Maternal Grandmother, Maternal Grandfather, Paternal Grandmother, Paternal Grandfather, Maternal Aunt, Maternal Uncle, Paternal Aunt, Paternal Uncle          Tobacco Use    Smoking status: Former     Current packs/day: 0.00     Types: Cigarettes     Start date: 2006     Quit date: 2015     Years since quittin.9    Smokeless tobacco: Never    Tobacco comments:     marijuana   Substance and Sexual Activity    Alcohol use: No     Alcohol/week: 0.0 standard drinks of alcohol    Drug use: Yes     Frequency: 7.0 times per week     Types: Marijuana    Sexual activity: Yes     Partners: Female     Review of Systems   Constitutional: Negative.   HENT: Negative.     Cardiovascular:  Positive for chest pain. Negative for claudication, cyanosis, dyspnea on exertion, irregular heartbeat, leg swelling, near-syncope, orthopnea, palpitations, paroxysmal nocturnal dyspnea and syncope.   Respiratory: Negative.     Endocrine: Negative.    Musculoskeletal: Negative.    Gastrointestinal: Negative.    Genitourinary: Negative.    Neurological: Negative.      Objective:     Vital Signs (Most Recent):  Temp: 98.2 °F  (36.8 °C) (04/30/24 2300)  Pulse: 83 (04/30/24 2300)  Resp: 18 (04/30/24 2300)  BP: (!) 153/91 (04/30/24 2300)  SpO2: (!) 94 % (04/30/24 2300) Vital Signs (24h Range):  Temp:  [97.9 °F (36.6 °C)-98.5 °F (36.9 °C)] 98.2 °F (36.8 °C)  Pulse:  [78-99] 83  Resp:  [16-20] 18  SpO2:  [94 %-98 %] 94 %  BP: (127-172)/() 153/91     Weight: 81.6 kg (180 lb)  Body mass index is 29.05 kg/m².    SpO2: (!) 94 %       No intake or output data in the 24 hours ending 05/01/24 0140    Lines/Drains/Airways       Peripheral Intravenous Line  Duration                  Peripheral IV - Single Lumen 04/30/24 1903 20 G Right Antecubital <1 day         Peripheral IV - Single Lumen 04/30/24 2218 20 G Left Antecubital <1 day                     Physical Exam  Vitals and nursing note reviewed.   Constitutional:       General: He is not in acute distress.     Appearance: Normal appearance. He is normal weight. He is not ill-appearing or diaphoretic.   HENT:      Head: Normocephalic and atraumatic.   Eyes:      Pupils: Pupils are equal, round, and reactive to light.   Cardiovascular:      Rate and Rhythm: Normal rate and regular rhythm.      Pulses: Normal pulses.      Heart sounds: Normal heart sounds.   Pulmonary:      Effort: Pulmonary effort is normal.      Breath sounds: Normal breath sounds.   Abdominal:      General: Abdomen is flat. Bowel sounds are normal. There is no distension.      Palpations: Abdomen is soft. There is no mass.      Tenderness: There is no abdominal tenderness.   Musculoskeletal:         General: Normal range of motion.   Skin:     General: Skin is warm.      Capillary Refill: Capillary refill takes less than 2 seconds.   Neurological:      General: No focal deficit present.      Mental Status: He is alert and oriented to person, place, and time.          Significant Labs:     Recent Labs   Lab 04/30/24 1921   WBC 11.48   HGB 16.7   HCT 52.4          Recent Labs   Lab 04/30/24 1921      K 4.0   CL  105   CO2 21*   BUN 13   CREATININE 1.3   CALCIUM 10.0       Recent Labs   Lab 04/30/24 1921   ALKPHOS 92   BILITOT 1.5*   PROT 7.9   ALT 20   AST 28       Recent Labs   Lab 04/30/24  2106   CHOL 158   TRIG 84   HDL 37*     Lab Results   Component Value Date    CHOL 158 04/30/2024    HDL 37 (L) 04/30/2024    LDLCALC 104.2 04/30/2024    TRIG 84 04/30/2024       Recent Labs   Lab 04/30/24  1921 04/30/24  2215   TROPONINI 2.121* 2.104*     Lab Results   Component Value Date     (H) 04/30/2024    BNP 79 03/23/2023     (H) 07/07/2022       Significant Imaging:     EKG 05/01/2024 and 04/30/2024  - Normal sinus rhythm, SARAH, Left axis deviation, RBBB, lateral changes consistent with ischemia    TTE 03/2023  CONGENITAL CARDIAC HISTORY:  Tetralogy of Fallot s/p repair  23-mm pulmonary homograft in pulmonary position ? 7/7/2006.  Left ventricular pacing wires.  Recurrent severe right ventricular hypertension: Multiple balloon dilations of pulmonary homograft (last 2010)  Anne valve 7/2015     Basic Anatomical Connections:  Abdominal situs is solitus.   Atrial situs is normal.   Atrioventricular concordance.   Grossly normal tricuspid and mitral valve structure.   RV dilation and RV hypertrophy.   Ventriculoarterial concordance.   Aortic valve is normal and pulmonic valve is abnormal.   Ventricular loop: D-loop.   Cardiac position is levocardia.   Left aortic arch branching.   No ventricular septal defect present.        IMPRESSION:  Qualitative minimal improvement in LV systolic function compared previous report-  Qualitative impression of mildly dilated right atrium  No obvious signs of previously reported dilation of the tricuspid annulus.  There is trivial to mild tricuspid insufficiency.  Mild right ventricle hypertrophy with moderate dilation.  Qualitative impression mildly reduced systolic function of the inlet and apical segments of the right ventricle with large outflow tract patch.  Inadequate Doppler  profile of tricuspid insufficiency to estimate right ventricular systolic pressure.  Echodensity consistent with patch repair of ventricular septal defect and malalignment of the ventricular septum with no residual shunt demonstrated.  Images consistent with Anne implant in pulmonary homograft with peak velocity <2.2 m/sec and mean <12 mm Hg.  No significant pulmonary insufficiency.  Qualitative impression of dilated right pulmonary artery with continuous-wave Doppler suggesting mild acceleration in the distal LPA..  The left atrial volume index is moderately enlarged measuring 46 ml/m2.   Trivial mitral valve insufficiency.  Qualitative impression of mild concentric left ventricular hypertrophy.  Mildly paradoxical septal motion with good movement of the LV free wall, SF = 27% and EF estimated 40 -45% from off axis apical views.  Trivial aortic valve insufficiency.  Aortic dimensions:  Sinuses of Valsalva = 39 mm.  ST junction             = 31 mm.  Ascending aorta     = 32 mm.  Normal left aortic arch with no evidence of coarctation.  No pericardial effusion.    Cath 7/23/15:  IMPRESSION:  1. Repaired tetralogy of Fallot with severe homograft stenosis, peak gradient 40 mmHg.  2. Right and left ventricular dysfunction and low cardiac output, normal pulmonary vascular resistance calculations.  3. Right aortic arch.  4. Right coronary artery from left sinus of Valsalva.  5. RV to PA conduit dilation with [place] 16, 18, 20 and 22 mm balloons.  6. RV to pulmonary artery conduit stent with Palmaz [id number] on 22 x 4 BIB (x3), postdilated with Reina 22 mm balloon (16 atmospheres).  7. Anne valve implantation on 22-Gabones[place] Ensemble, residual gradient 9 mmHg, trivial insufficiency.

## 2024-05-01 NOTE — ASSESSMENT & PLAN NOTE
#HFrEF 40%, NYHA I, ACC/AHA class B/C:  - Etiology: Likely structural Heart Disease   - Hemodynamic profile: Perfused and appears euvolemic but elevated BNP (on Entresto) and CXR with prominence of the pulmonary vascular with bilateral pattern of pulmonary infiltrates  - Difficult compliance as per outpatient cardiology notes, probably taking meds only once a day  - Home GDMT: Carvedilol 25mg bid, Entresto 97-103mg bid, spironolactone held when entresto was initiated    - Ordered NT-ProBNP  - Repeat TTE  - Volume status optimization  - Furosemide 80mg once given, evaluate need of additional dosing  - Daily monitoring: Standing weights, Strict I/O, Na restriction (<2g/day), Keep K>4 and Mg>2  - GDMT: Continue Valsartan/Sacubitril (97/103mg bid), Carvedilol 25mg bid  - Not requiring vasodilator or inotropic support for now

## 2024-05-01 NOTE — ASSESSMENT & PLAN NOTE
DATE OF ADMISSION:  2019

DATE OF DISCHARGE:  2019

 

 

DIAGNOSES:

1.  Term female .

2.  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

 

PROCEDURES DURING HOSPITALIZATION:

1.  Hearing screen.

2.  Bili check.

 

HISTORY:

This child is a term female  who was delivered by spontaneous vaginal

delivery at [facility] on the evening of 2019.  Mother is 21

years old,  3, now para 2.  Her blood type is A+.  Her group B strep

screen was negative.  Her hepatitis B surface antigen, RPR and HIV status were

all negative.  Rupture of membranes occurred 7-1/2 hours prior to delivery with

meconium-stained fluid.  The child was active at delivery and did not require

tracheal suctioning.  The child was given Apgar scores of eight at 1 minute and

nine at 5 minutes.  Birthweight 3470 grams, which is 7 pounds 10 ounces, length

53 cm, head circumference 31.5 cm.  [facility] physical examination was normal with

a fairly large, flat brown nevus noted on the lower back and flank.  Small

Italian spot birthmarks were also noted to be present.  The child was given her

initial hepatitis B vaccination on her day of delivery.  The child passed a

hearing screen.  She was discharged to home in good condition to her parents'

care on 2019.  She is now 2 days postdelivery.  Her weight on the day of

discharge is 3320 grams, which is 7 pounds 5 ounces.

 

On the day of discharge, the child was alert and responsive.  She had no clinical

jaundice with a bili check of 6.6 and she was breast-feeding well.  On the day of

discharge, the child was breathing comfortably in room air with clear breath

sounds, good aeration and no distress.  Her heart was regular with no murmur and

her abdomen was soft and nondistended.

 

The child's followup care is going to [facility] at [facility] and is scheduled

on 2019.  I faxed a summary of the child's hospital course to the office

for her office records. Summary of Diagnosis:    1. 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   - epicardial left ventricular pacing wires in place.   3. 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 Mild pulmonary artery hypertension noted in the past.   5. Trace aortic insufficiency and mild aortic root enlargement  6. Long history of decreased LV function - possibly exacerbated by high afterload.   - mildly improved with ejection fraction around 40-45% on echocardiogram 2023  7. Hypertension - poor compliance in the past with medications, still an issue  Strong family history of hypertension.    Discussion:    last seen by Dr. Corona July 2023.  An echocardiogram in March 2023 looked good although his ejection fraction was around 40-45%, actually improved compared to previous studies.  Blood pressure was again significantly elevated, and it was clear that his compliance with medications was suboptimal.  The plan was to see him back in 6 months, and stressed the importance of medication compliance  Called by an ER in [place].  Patient presented Tuesday with chest pain that started Monday.  Troponin was elevated at 1.85 with their upper limit of normal 0.12.  BNP was significantly elevated 827.  Our most recent BNP a year ago was normal although he has had mild elevation in the past.  Patient was given aspirin and sublingual nitroglycerin, and chest pain improved.  EKG, by report, unchanged with right bundle branch block, left axis deviation.  Discussed with Dr. Beatris Thompson (HTS service) and with Dr. Mckeon in Interventional Cardiology.  Recommendation was for transfer to our emergency room where the patient can be evaluated by Interventional Cardiology to see if we think there is a need for emergent catheterization given chest  pain and elevated troponin.  If not, can be admitted to the [facility] service for further evaluation of heart failure.  No history of fever, and by report CBC does not suggest acute infection, but he is definitely at risk for endocarditis given his Anne valve in the pulmonary position.  No murmur heard on PE and PV looks good on echo - RV function looks okay with minimal tricuspid insufficiency. Mild pulmonary stenosis. No significant PV insufficiency, appears the same as on past echos. Likely the PV is not the issue. Pt responded well to nitroglycerin with no reports of chest pain since midnight last night so may need to conduct angiogram of his coronaries.

## 2024-05-01 NOTE — HPI
34yo M patient with repaired Tetralogy of Fallot (right arch, right coronary from left sinus), replacement of pulmonary valve with a 23-mm pulmonary homograft (07/07/2006) - epicardial LV pacing wires in place, severe homograft obstruction s/p RVOT stents and Anne valve 7/23/15 and working well on 03/2023, mild pulmonary artery hypertension, trace aortic insufficiency and mild aortic root enlargement, long history of decreased LV function - possibly exacerbated by high afterload (40-45% on 2023), and HTN, who was transferred to [facility] on 04/30/2024 with CC of chest pain.     Per patient chest pain is on the right side, started 2-3 days prior to admission while he was lying down, waxing and weaning but always present, described as tightness and burning, not improving or worsening by anything at home, and at the hospital mild improvement with nitro. He went to OSH were elevated troponin was seen and was diagnosed with NSTEMI, given ASA loading dose and transferred for interventional cardiology evaluation. He also complained of cough for the last week, but denies low extremity swelling, orthopnea, PND, palpitations, syncope, and is not sure if he is having shortness of breath or not.     Admitted to CCU for close follow overnight.

## 2024-05-01 NOTE — ED TRIAGE NOTES
Thomas Horvath, a 33 y.o. male presents to the ED w/ complaint of chest pain.  Patient arrives from [facility] for abnormal labs.  States he sometimes has pain that radiates across chest.     Triage note:  Chief Complaint   Patient presents with    Abnormal Labs     Arrival via ems coming from [facility], coming in for elevated Troponin levels.     Chest Pain     Complaint of  chest pain     Review of patient's allergies indicates:  No Known Allergies  Past Medical History:   Diagnosis Date    Aortic insufficiency     trace    Homograft cardiac valve stenosis     mild    Hypertension     Left ventricular dysfunction     Pulmonary artery hypertension     RV hypertension    Right ventricular dilation     S/P TOF (tetralogy of Fallot) repair 12/18/2014    TOF (tetralogy of Fallot)

## 2024-05-01 NOTE — CONSULTS
[facility] - Cardiac [facility]ensive [facility]  Cardiology  Consult Note    Patient Name: Thomas Horvath  MRN: [id number]  Admission Date: 4/30/2024  Hospital Length of Stay: 1 days  Code Status: Full Code   Attending Provider: Willi Overton MD   Consulting Provider: Maura Harvey PA-C  Primary Care Physician: Sissy Lacy MD  Principal Problem:<principal problem not specified>    Patient information was obtained from patient, past medical records, and ER records.     Consults  Subjective:     Chief Complaint:  Chest pain    HPI:   34yo M patient with repaired Tetralogy of Fallot (right arch, right coronary from left sinus), replacement of pulmonary valve with a 23-mm pulmonary homograft (07/07/2006) - epicardial LV pacing wires in place, severe homograft obstruction s/p RVOT stents and Anne valve 7/23/15 and working well on 03/2023, mild pulmonary artery hypertension, trace aortic insufficiency and mild aortic root enlargement, long history of decreased LV function - possibly exacerbated by high afterload (40-45% on 2023), and HTN, who was transferred to [facility] on 04/30/2024 with CC of chest pain.     Per patient chest pain is on the right side, started 2-3 days prior to admission while he was lying down, waxing and weaning but always present, described as tightness and burning, not improving or worsening by anything at home, and at the hospital mild improvement with nitro. He went to OSH were elevated troponin was seen and was diagnosed with NSTEMI, given ASA loading dose and transferred for interventional cardiology evaluation. He also complained of cough for the last week, but denies low extremity swelling, orthopnea, PND, palpitations, syncope, and is not sure if he is having shortness of breath or not.     Admitted to CCU for close follow overnight.     Interval History:    Pt resting comfortably during interview/exam and denies any cardiac sx. He reports that he has not experienced chest pain since approx  midnight last night. He says that he had been feeling chest pain for a few days that had started at rest. Mom and girlfriend (Mariia) at bedside.      Past Medical History:   Diagnosis Date    Aortic insufficiency     trace    Homograft cardiac valve stenosis     mild    Hypertension     Left ventricular dysfunction     Pulmonary artery hypertension     RV hypertension    Right ventricular dilation     S/P TOF (tetralogy of Fallot) repair 12/18/2014    TOF (tetralogy of Fallot)        Past Surgical History:   Procedure Laterality Date    balloon dilation of pulmonary atrery homograft  2008    CARDIAC VALVE SURGERY      7/23/15 @ [facility]    epicardial pacing wires      LV    pulmonary valve replacement  July 7, 2006    TETRALOGY OF FALLOT REPAIR  1990       Review of patient's allergies indicates:  No Known Allergies    Current Facility-Administered Medications   Medication Dose Route Frequency Provider Last Rate Last Admin    amLODIPine tablet 10 mg  10 mg Oral Daily Kyler Alberto MD   10 mg at 05/01/24 0846    aspirin chewable tablet 81 mg  81 mg Oral Daily Kyler Alberto MD   81 mg at 05/01/24 0846    atorvastatin tablet 80 mg  80 mg Oral QHS Kyler Alberto MD        carvediloL tablet 25 mg  25 mg Oral BID Kyler Alberto MD   25 mg at 05/01/24 0846    furosemide injection 80 mg  80 mg Intravenous Q12H , MD Jonatan   80 mg at 05/01/24 1025    heparin 25,000 units in dextrose 5% (100 units/ml) IV bolus from bag LOW INTENSITY nomogram - [facility]  56.4 Units/kg (Adjusted) Intravenous PRN Kyler Alberto MD   3,990 Units at 05/01/24 0506    heparin 25,000 units in dextrose 5% (100 units/ml) IV bolus from bag LOW INTENSITY nomogram - [facility]  30 Units/kg (Adjusted) Intravenous PRN Kyler Alberto MD        heparin 25,000 units in dextrose 5% 250 mL (100 units/mL) infusion LOW INTENSITY nomogram - [facility]  0-40 Units/kg/hr (Adjusted) Intravenous  Continuous Kyler Alberto MD 10.6 mL/hr at 24 1205 15 Units/kg/hr at 24 1205    hydrALAZINE tablet 50 mg  50 mg Oral Q12H Kyler Alberto MD   50 mg at 24 0846    melatonin tablet 9 mg  9 mg Oral Nightly PRN Kyler Alberto MD   9 mg at 24 0357    nitroGLYCERIN in 5 % dextrose 50 mg/250 mL (200 mcg/mL) infusion  0-400 mcg/min Intravenous Continuous Kyler Alberto MD 1.5 mL/hr at 24 0403 5 mcg/min at 24 0403    sacubitriL-valsartan  mg per tablet 1 tablet  1 tablet Oral BID Kyler Alberto MD   1 tablet at 24 0845    sodium chloride 0.9% flush 10 mL  10 mL Intravenous PRN Kyler Alberto MD         Family History       Problem Relation (Age of Onset)    Hypertension Mother    No Known Problems Father, Sister, Brother, Maternal Grandmother, Maternal Grandfather, Paternal Grandmother, Paternal Grandfather, Maternal Aunt, Maternal Uncle, Paternal Aunt, Paternal Uncle          Tobacco Use    Smoking status: Former     Current packs/day: 0.00     Types: Cigarettes     Start date: 2006     Quit date: 2015     Years since quittin.9    Smokeless tobacco: Never    Tobacco comments:     marijuana   Substance and Sexual Activity    Alcohol use: No     Alcohol/week: 0.0 standard drinks of alcohol    Drug use: Yes     Frequency: 7.0 times per week     Types: Marijuana    Sexual activity: Yes     Partners: Female     Objective:     Vital Signs (Most Recent):  Temp: 98.2 °F (36.8 °C) (24 0705)  Pulse: 82 (24 120)  Resp: 16 (24 120)  BP: (!) 117/57 (24 1205)  SpO2: 95 % (24 120) Vital Signs (24h Range):  Temp:  [97.9 °F (36.6 °C)-98.6 °F (37 °C)] 98.2 °F (36.8 °C)  Pulse:  [78-99] 82  Resp:  [15-32] 16  SpO2:  [94 %-98 %] 95 %  BP: (117-172)/() 117/57     Weight: 98.5 kg (217 lb 2.5 oz)  Body mass index is 35.05 kg/m².    SpO2: 95 %          Intake/Output Summary (Last 24 hours) at 5/1/2024 1320  Last data filed at 5/1/2024 1205  Gross per 24 hour   Intake 708.8 ml   Output 1250 ml   Net -541.2 ml       Lines/Drains/Airways       Peripheral Intravenous Line  Duration                  Peripheral IV - Single Lumen 04/30/24 1903 20 G Right Antecubital <1 day         Peripheral IV - Single Lumen 04/30/24 2218 20 G Left Antecubital <1 day                     Physical Exam  Constitutional:       General: He is not in acute distress.     Appearance: He is obese. He is not ill-appearing.   HENT:      Head: Normocephalic and atraumatic.   Eyes:      Extraocular Movements: Extraocular movements intact.      Pupils: Pupils are equal, round, and reactive to light.   Cardiovascular:      Rate and Rhythm: Normal rate and regular rhythm.      Pulses: Normal pulses.      Comments: No murmur hear on auscultation. Fixed & split S2 sound  Pulmonary:      Effort: Pulmonary effort is normal.      Breath sounds: Normal breath sounds.   Abdominal:      General: There is no distension.      Palpations: Abdomen is soft.      Tenderness: There is no abdominal tenderness.   Musculoskeletal:      Cervical back: Normal range of motion and neck supple.   Skin:     General: Skin is warm.      Capillary Refill: Capillary refill takes less than 2 seconds.   Neurological:      General: No focal deficit present.      Mental Status: He is oriented to person, place, and time.   Psychiatric:         Mood and Affect: Mood normal.         Behavior: Behavior normal.          Significant Labs:   Recent Labs   Lab 04/30/24 1921 05/01/24  0326   * 84    140   K 4.0 3.1*    110   CO2 21* 20*   BUN 13 13   CREATININE 1.3 0.9   CALCIUM 10.0 7.8*   MG  --  1.4*   , CMP   Recent Labs   Lab 04/30/24 1921 05/01/24  0326    140   K 4.0 3.1*    110   CO2 21* 20*   * 84   BUN 13 13   CREATININE 1.3 0.9   CALCIUM 10.0 7.8*   PROT 7.9  --    ALBUMIN 3.8  --     BILITOT 1.5*  --    ALKPHOS 92  --    AST 28  --    ALT 20  --    ANIONGAP 11 10   , CBC   Recent Labs   Lab 04/30/24  1921 05/01/24  0326   WBC 11.48 9.86   HGB 16.7 16.2   HCT 52.4 49.4    260   , INR   Recent Labs   Lab 04/30/24  2106   INR 1.2   , Lipid Panel   Recent Labs   Lab 04/30/24  2106   CHOL 158   HDL 37*   LDLCALC 104.2   TRIG 84   CHOLHDL 23.4   ,   Pathology Results  (Last 10 years)      None        , Troponin   Recent Labs   Lab 04/30/24  2215 05/01/24  0326 05/01/24  1154   TROPONINI 2.104* 2.676* 3.371*   , and   Recent Lab Results  (Last 5 results in the past 24 hours)        05/01/24  1154   05/01/24  1031   05/01/24  0855   05/01/24  0326   05/01/24  0019        Anion Gap       10         Ao peak romi     0.51           Ao VTI     7.24           PTT   41.9  Comment: Refer to local heparin nomogram for intensity/dose specific   therapeutic   range.       31.2  Comment: Refer to local heparin nomogram for intensity/dose specific   therapeutic   range.           AV valve area     6.41           SANKET by Velocity Ratio     4.20           AV mean gradient     1           AV index (prosthetic)     1.32           AV peak gradient     1           AV Velocity Ratio     0.86           Baso #       0.05         Basophil %       0.5         BSA     2.14           BUN       13         Calcium       7.8         Chloride       110         CO2       20         Creatinine       0.9         Left Ventricle Relative Wall Thickness     0.39           Differential Method       Automated         E/E' ratio     21.43           eGFR       >60.0         EF     18           Eos #       0.6         Eos %       6.0         FS     22           Glucose       84         Gran # (ANC)       6.6         Gran %       66.9         Hematocrit       49.4         Hemoglobin       16.2         Immature Grans (Abs)       0.04  Comment: Mild elevation in immature granulocytes is non specific and   can be seen in a variety of  conditions including stress response,   acute inflammation, trauma and pregnancy. Correlation with other   laboratory and clinical findings is essential.           Immature Granulocytes       0.4         IVSd     0.91           LA WIDTH     2.86           Lactic Acid Level         1.0  Comment: Falsely low lactic acid results can be found in samples   containing >=13.0 mg/dL total bilirubin and/or >=3.5 mg/dL   direct bilirubin.         Left Atrium Major Axis     5.80           Left Atrium Minor Axis     5.80           LA size     3.06           LA volume     43.15                35.18           LA vol index     20.8           LA Volume Index (Mod)     17.0           LVOT area     4.9           LV LATERAL E/E' RATIO     18.75           LV SEPTAL E/E' RATIO     25.00           LV EDV BP     146.46           LV Diastolic Volume Index     70.75           LVIDd     5.48           LVIDs     4.25           LV mass     209.09           LV Mass Index     101           LVOT diameter     2.49           LVOT peak celso     0.44           LVOT stroke volume     46.43           LVOT peak VTI     9.54           LV ESV BP     80.68           LV Systolic Volume Index     39.0           Lymph #       1.6         Lymph %       15.7         Magnesium        1.4         MCH       27.6         MCHC       32.8         MCV       84         Mean e'     0.04           Mono #       1.0         Mono %       10.5         MPV       10.7         MV Peak E Celso     0.75           nRBC       0         Phosphorus Level       3.2         Platelet Count       260         Potassium       3.1         Posterior Wall     1.07           RA Major Axis     6.72           Est. RA pres     8           RA Width     5.23           RBC       5.86         RDW       13.7         RV/LV Ratio     0.63           RVDD     3.43           Sinus     4.12           Sodium       140         TAPSE     1.23           TDI SEPTAL     0.03           TDI LATERAL     0.04            Troponin I 3.371  Comment: The reference interval for Troponin I represents the 99th percentile   cutoff   for our facility and is consistent with 3rd generation assay   performance.         2.676  Comment: The reference interval for Troponin I represents the 99th percentile   cutoff   for our facility and is consistent with 3rd generation assay   performance.           WBC       9.86         ZLVIDD     -1.38           ZLVIDS     0.78                                  Significant Imaging:     Echo (5/1/24 at 8:55 AM)      Left Ventricle: Ventricular mass is normal. Mildly increased wall thickness. Severe global hypokinesis present. Septal motion is abnormal. There is severely reduced systolic function with a visually estimated ejection fraction of 15 - 20%. Ejection fraction by visual approximation is 18%. The biplane LVEF was much higher but not accurate due to technical issues. There is diastolic dysfunction but grade cannot be determined.    Right Ventricle: Systolic function is moderately reduced.    Right Atrium: Right atrium is moderately dilated.    IVC/SVC: Intermediate venous pressure at 8 mmHg.    Echo (5/1/24 at 1:38 PM)    Repeat echo to better image pulmonary valve. Pending official report. Initial read of echo below:      RV looks okay with mild tricuspid insufficiency    Mild pulmonary stenosis    No significant pulmonary insufficiency, appears to different than echos in the past    Pulmonary valve seems to functioning well        Assessment and Plan:     NSTEMI (non-ST elevated myocardial infarction)  NSTEMI - ISAI 3 (13%), WING 64 (1.2%)  - Risk factors and established atherosclerotic disease: Young patient with congenital heart disease, no major atherosclerotic risk factors  - Diagnosis - Atypical chest pain (burning and tightness on right side of the chest), EKG shows left sided ST-T wave changes possible for ischemia, and myocardial injury is evidenced by trop of 2.121 and 2.104 (at OSH 1.85)  -  "Nitro sublingual with mild improvement of symptoms  - Due to nonspecific symptoms, and burning in nature, with some complain of "acid" in the back of his throat, will attempt GI cocktail    - Admit to CICU  - Follow up studies - serial troponin  - Ordered TTE  - Telemetry to monitor for ventricular arrhythmias  - ACS protocol: Continue ASA 81mg qd, heparin gtt, atorvastatin 80mg qhs  - GDMT: Carvedilol  - Anti-ischemic medications: Nitrates sublingual and morphine PRN  - If patient fails medical therapy will consider invasive approach     Adult congenital heart disease  Summary of Diagnosis:    1. 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   - epicardial left ventricular pacing wires in place.   3. 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 Mild pulmonary artery hypertension noted in the past.   5. Trace aortic insufficiency and mild aortic root enlargement  6. Long history of decreased LV function - possibly exacerbated by high afterload.   - mildly improved with ejection fraction around 40-45% on echocardiogram 2023  7. Hypertension - poor compliance in the past with medications, still an issue  Strong family history of hypertension.    Discussion:    last seen by Dr. Corona July 2023.  An echocardiogram in March 2023 looked good although his ejection fraction was around 40-45%, actually improved compared to previous studies.  Blood pressure was again significantly elevated, and it was clear that his compliance with medications was suboptimal.  The plan was to see him back in 6 months, and stressed the importance of medication compliance  Called by an ER in [place].  Patient presented Tuesday with chest pain that started Monday.  Troponin was elevated at 1.85 with their upper limit of normal 0.12.  BNP " was significantly elevated 827.  Our most recent BNP a year ago was normal although he has had mild elevation in the past.  Patient was given aspirin and sublingual nitroglycerin, and chest pain improved.  EKG, by report, unchanged with right bundle branch block, left axis deviation.  Discussed with Dr. Beatris Thompson (HTS service) and with Dr. Mckeon in Interventional Cardiology.  Recommendation was for transfer to our emergency room where the patient can be evaluated by Interventional Cardiology to see if we think there is a need for emergent catheterization given chest pain and elevated troponin.  If not, can be admitted to the HTS service for further evaluation of heart failure.  No history of fever, and by report CBC does not suggest acute infection, but he is definitely at risk for endocarditis given his Anne valve in the pulmonary position.  No murmur heard on PE and PV looks good on echo - RV function looks okay with minimal tricuspid insufficiency. Mild pulmonary stenosis. No significant PV insufficiency, appears the same as on past echos. Likely the PV is not the issue. Pt responded well to nitroglycerin with no reports of chest pain since midnight last night so may need to conduct angiogram of his coronaries.    HFrEF (heart failure with reduced ejection fraction)  #HFrEF 40%, NYHA I, ACC/AHA class B/C:  - Etiology: Likely structural Heart Disease   - Hemodynamic profile: Perfused and appears euvolemic but elevated BNP (on Entresto) and CXR with prominence of the pulmonary vascular with bilateral pattern of pulmonary infiltrates  - Difficult compliance as per outpatient cardiology notes, probably taking meds only once a day  - Home GDMT: Carvedilol 25mg bid, Entresto 97-103mg bid, spironolactone held when entresto was initiated    - Ordered NT-ProBNP  - Repeat TTE  - Volume status optimization  - Furosemide 80mg once given, evaluate need of additional dosing  - Daily monitoring: Standing weights,  Strict I/O, Na restriction (<2g/day), Keep K>4 and Mg>2  - GDMT: Continue Valsartan/Sacubitril (97/103mg bid), Carvedilol 25mg bid  - Not requiring vasodilator or inotropic support for now    Essential hypertension  - Poor compliance in the past with medications as per cardiologist note  - Strong family history of hypertension.  - Outpatient on amlodipine 10mg, carvedilol 25mg bid, hydralazine 50mg bid and on entresto 97/103mg for HF       - Continue with outpatient medications        VTE Risk Mitigation (From admission, onward)           Ordered     IP VTE HIGH RISK PATIENT  Once         04/30/24 2355     Place sequential compression device  Until discontinued         04/30/24 2355     heparin 25,000 units in dextrose 5% (100 units/ml) IV bolus from bag LOW INTENSITY nomogram - [facility]  As needed (PRN)        Question:  Heparin Infusion Adjustment (DO NOT MODIFY ANSWER)  Answer:  [email or web address]\Images\Pharmacy\HeparinInfusions\heparin LOW INTENSITY nomogram for [facility] [id number]    04/30/24 2050     heparin 25,000 units in dextrose 5% (100 units/ml) IV bolus from bag LOW INTENSITY nomogram - [facility]  As needed (PRN)        Question:  Heparin Infusion Adjustment (DO NOT MODIFY ANSWER)  Answer:  [email or web address]\Images\Pharmacy\HeparinInfusions\heparin LOW INTENSITY nomogram for [facility] [id number]    04/30/24 2050     heparin 25,000 units in dextrose 5% 250 mL (100 units/mL) infusion LOW INTENSITY nomogram - [facility]  Continuous        Question:  Begin at (units/kg/hr)  Answer:  12    04/30/24 2050                    Thank you for your consult. I will follow-up with patient. Please contact us if you have any additional questions.    Maura Harvey PA-C  Cardiology   [facility] - Card[facility]

## 2024-05-01 NOTE — CARE UPDATE
-Will be getting a formal congenital echo in the setting of TOF, to better evaluate Anne valve.  -Resume diet  -Diuresis   -Continue nitrates  -CTA vs angiogram to rule in/out coronary disease

## 2024-05-01 NOTE — ASSESSMENT & PLAN NOTE
- Poor compliance in the past with medications as per cardiologist note  - Strong family history of hypertension.  - Outpatient on amlodipine 10mg, carvedilol 25mg bid, hydralazine 50mg bid and on entresto 97/103mg for HF       - Continue with outpatient medications

## 2024-05-01 NOTE — PLAN OF CARE
[facility] - Cardiac Intensive Care  Initial Discharge Assessment       Primary Care Provider: Sissy Lacy MD    Admission Diagnosis: NSTEMI (non-ST elevated myocardial infarction) [I21.4]  Chest pain [R07.9]    Admission Date: 4/30/2024  Expected Discharge Date: 5/3/2024    Transition of Care Barriers: None    Payor: MEDICAID / Plan: HEALTHY BLUE [place] / Product Type: Managed Medicaid /     Extended Emergency Contact Information  Primary Emergency Contact: Sherice Zafar  Address: [address] [place] [address] [id number] [place] [address]  Mobile Phone: [phone]  Relation: Mother  Secondary Emergency Contact: Mariia Espino  Mobile Phone: [phone]  Relation: Significant other    Discharge Plan A: Home with family  Discharge Plan B: Home      [id number] - [place], [place] - [address] [facility] [address]  [address] [facility] [address]  Phone: [phone] Fax: [phone]    [facility] [address] [address]  Phone: [phone] Fax: [phone]      Initial Assessment (most recent)       Adult Discharge Assessment - 05/01/24 1338          Discharge Assessment    Assessment Type Discharge Planning Assessment     Confirmed/corrected address, phone number and insurance Yes     Confirmed Demographics Correct on Facesheet     Source of Information patient;family;health record     Reason For Admission NSTEMI     People in Home significant other     Facility Arrived From: [facility]     Do you expect to return to your current living situation? Yes     Do you have help at home or someone to help you manage your care at home? Yes     Who are your caregiver(s) and their phone number(s)? Sherice Zafar (mother) [phone], Mariia Espino (girfriend) [phone]     Prior to hospitilization cognitive status: Alert/Oriented     Current cognitive status: Alert/Oriented     Walking or Climbing Stairs Difficulty no      Dressing/Bathing Difficulty no     Equipment Currently Used at Home none     Readmission within 30 days? No     Patient currently being followed by outpatient case management? No     Do you currently have service(s) that help you manage your care at home? No     Do you take prescription medications? Yes     Do you have prescription coverage? Yes     Do you have any problems affording any of your prescribed medications? No     Is the patient taking medications as prescribed? yes     Who is going to help you get home at discharge? Sherice Zafar (mother) [phone], Mariia Espino (girfriend) [phone]     How do you get to doctors appointments? car, drives self     Are you on dialysis? No     Do you take coumadin? No     Discharge Plan A Home with family     Discharge Plan B Home     DME Needed Upon Discharge  none     Discharge Plan discussed with: Patient;Spouse/sig other;Parent(s)     Name(s) and Number(s) Sherice Zafar (mother) [phone], Mariia Espino (chanfriend) [phone]     Transition of Care Barriers None        Physical Activity    On average, how many days per week do you engage in moderate to strenuous exercise (like a brisk walk)? 7 days     On average, how many minutes do you engage in exercise at this level? Patient declined        Financial Resource Strain    How hard is it for you to pay for the very basics like food, housing, medical care, and heating? Not hard at all        Housing Stability    In the last 12 months, was there a time when you were not able to pay the mortgage or rent on time? No     At any time in the past 12 months, were you homeless or living in a shelter (including now)? No        Transportation Needs    In the past 12 months, has lack of transportation kept you from medical appointments or from getting medications? No     In the past 12 months, has lack of transportation kept you from meetings, work, or from getting things needed for daily living? No        Food  Insecurity    Within the past 12 months, you worried that your food would run out before you got the money to buy more. Never true     Within the past 12 months, the food you bought just didn't last and you didn't have money to get more. Never true        Stress    Do you feel stress - tense, restless, nervous, or anxious, or unable to sleep at night because your mind is troubled all the time - these days? Not at all        Alcohol Use    Q1: How often do you have a drink containing alcohol? Never     Q2: How many drinks containing alcohol do you have on a typical day when you are drinking? Patient does not drink     Q3: How often do you have six or more drinks on one occasion? Never        OTHER    Name(s) of People in Home Mariia Espino (girfriend) [phone]                   KATHERIN met with pt, mother, and girlfriend Mariia to discuss discharge planning.  Pt lives with Mariia and is independent with ambulation and ADLs.  Pt will have transportation and assistance from his mother and Mariia at discharge.  Discharge Plan A and Plan B have been determined by review of patient's clinical status, future medical and therapeutic needs, and coverage/benefits for post-acute care in coordination with multidisciplinary team members.  KATHERIN name and ext on whiteboard; discharge planning booklet provided.  Will continue to follow.      Eulalia Alejandro LMSW  [facility]  [id number]

## 2024-05-01 NOTE — PLAN OF CARE
ICU DAILY GOALS     Family/Goals of care/Code Status   Code Status: Full Code    24H Vital Sign Range  Temp:  [97.9 °F (36.6 °C)-98.6 °F (37 °C)]   Pulse:  [78-99]   Resp:  [16-23]   BP: (127-172)/()   SpO2:  [94 %-98 %]      Shift Events (include procedures and significant events)   No acute events throughout shift    AWAKE RASS: Goal -    Actual -      Restraint necessity: Not necessary   BREATHE SBT: Not intubated    Coordinate A & B, analgesics/sedatives Pain: managed   SAT: Not intubated   Delirium CAM-ICU:     Early(intubated/ Progressive (non-intubated) Mobility MOVE Screen (INTUBATED ONLY): Not intubated    Activity: Activity Management: Rolling - L1   Feeding/Nutrition Diet order: Diet/Nutrition Received: NPO,     Thrombus DVT prophylaxis: VTE Required Core Measure: Pharmacological prophylaxis initiated/maintained   HOB Elevation Head of Bed (HOB) Positioning: HOB elevated   Ulcer Prophylaxis GI: yes   Glucose control  No issues   Skin Skin assessed during: Admit    Sacrum intact/not altered? Yes  Heels intact/not altered? Yes  Surgical wound? No    CHECK ONE!   (no altered skin or altered skin) and sub boxes:  [x] No Altered Skin Integrity Present    [x]Prevention Measures Documented    [] Altered Skin Integrity Present or Discovered   [] LDA present in EPIC, daily doc completed              [] LDA added if not in EPIC (describe wound).                    When describing wound, do not stage, use descriptive words only.    [] Wound Image Taken (required on admit,                   transfer/discharge and every Tuesday)    Wound Care Consulted? No    4 EYES:  Attending Nurse (1st set of eyes):     Second RN/Staff Member (2nd set of eyes):    Bowel Function no issues    Indwelling Catheter Necessity       Voids     De-escalation Antibiotics  N/A        VS and assessment per flow sheet, patient progressing towards goals as tolerated, plan of care reviewed with  Thomas, all concerns addressed, will  continue to monitor.    Problem: Adult Inpatient Plan of Care  Goal: Plan of Care Review  Outcome: Progressing  Goal: Optimal Comfort and Wellbeing  Outcome: Progressing

## 2024-05-01 NOTE — H&P
[facility] - Cardiac Intensive Care  Cardiology  History and Physical     Patient Name: Thomas Horvath  MRN: [id number]  Admission Date: 4/30/2024  Code Status: Full Code   Attending Provider: Willi Overton MD   Primary Care Physician: Sissy Lacy MD  Principal Problem:<principal problem not specified>    Patient information was obtained from patient and ER records.     Subjective:     Chief Complaint:  Atypical chest pain     HPI:  34yo M patient with repaired Tetralogy of Fallot (right arch, right coronary from left sinus), replacement of pulmonary valve with a 23-mm pulmonary homograft (07/07/2006) - epicardial LV pacing wires in place, severe homograft obstruction s/p RVOT stents and Anne valve 7/23/15 and working well on 03/2023, mild pulmonary artery hypertension, trace aortic insufficiency and mild aortic root enlargement, long history of decreased LV function - possibly exacerbated by high afterload (40-45% on 2023), and HTN, who was transferred to [facility] on 04/30/2024 with CC of chest pain.     Per patient chest pain is on the right side, started 2-3 days prior to admission while he was lying down, waxing and weaning but always present, described as tightness and burning, not improving or worsening by anything at home, and at the hospital mild improvement with nitro. He went to OSH were elevated troponin was seen and was diagnosed with NSTEMI, given ASA loading dose and transferred for interventional cardiology evaluation. He also complained of cough for the last week, but denies low extremity swelling, orthopnea, PND, palpitations, syncope, and is not sure if he is having shortness of breath or not.     Admitted to CCU for close follow overnight.     Past Medical History:   Diagnosis Date    Aortic insufficiency     trace    Homograft cardiac valve stenosis     mild    Hypertension     Left ventricular dysfunction     Pulmonary artery hypertension     RV hypertension    Right ventricular dilation      S/P TOF (tetralogy of Fallot) repair 12/18/2014    TOF (tetralogy of Fallot)        Past Surgical History:   Procedure Laterality Date    balloon dilation of pulmonary atrery homograft  2008    CARDIAC VALVE SURGERY      7/23/15 @ [facility]    epicardial pacing wires      LV    pulmonary valve replacement  July 7, 2006    TETRALOGY OF FALLOT REPAIR  1990       Review of patient's allergies indicates:  No Known Allergies    Current Facility-Administered Medications   Medication Dose Route Frequency Provider Last Rate Last Admin    aluminum-magnesium hydroxide-simethicone 200-200-20 mg/5 mL suspension 30 mL  30 mL Oral Once Kyler Alberto MD        And    LIDOcaine viscous HCl 2% oral solution 15 mL  15 mL Oral Once Kyler Alberto MD        heparin 25,000 units in dextrose 5% (100 units/ml) IV bolus from bag LOW INTENSITY nomogram - [facility]  56.4 Units/kg (Adjusted) Intravenous PRN Indra Coreas MD        heparin 25,000 units in dextrose 5% (100 units/ml) IV bolus from bag LOW INTENSITY nomogram - [facility]  30 Units/kg (Adjusted) Intravenous PRN Indra Coreas MD        heparin 25,000 units in dextrose 5% 250 mL (100 units/mL) infusion LOW INTENSITY nomogram - [facility]  0-40 Units/kg/hr (Adjusted) Intravenous Continuous Indra Coreas MD 8.5 mL/hr at 04/30/24 2212 12 Units/kg/hr at 04/30/24 2212    sodium chloride 0.9% flush 10 mL  10 mL Intravenous PRN Kingsley Stephenson MD         Current Outpatient Medications   Medication Sig Dispense Refill    amLODIPine (NORVASC) 10 MG tablet Take 1 tablet (10 mg total) by mouth once daily. 30 tablet 11    aspirin 81 MG Chew Take 1 tablet (81 mg total) by mouth once daily. 30 tablet 11    carvediloL (COREG) 25 MG tablet Take 1 tablet (25 mg total) by mouth 2 (two) times daily. 60 tablet 11    digoxin (LANOXIN) 125 mcg tablet Take 1 tablet (125 mcg total) by mouth once daily. 30 tablet 11    hydrALAZINE (APRESOLINE) 50 MG tablet Take 1 tablet (50 mg total) by mouth  every 12 (twelve) hours. 90 tablet 11    ibuprofen (ADVIL,MOTRIN) 800 MG tablet Take 800 mg by mouth every 6 (six) hours as needed.      rosuvastatin (CRESTOR) 5 MG tablet Take 1 tablet (5 mg total) by mouth once daily. 90 tablet 3    sacubitriL-valsartan (ENTRESTO)  mg per tablet Take 1 tablet by mouth 2 (two) times daily. 60 tablet 11    spironolactone (ALDACTONE) 25 MG tablet Take 1 tablet (25 mg total) by mouth once daily. 30 tablet 11     Family History       Problem Relation (Age of Onset)    Hypertension Mother    No Known Problems Father, Sister, Brother, Maternal Grandmother, Maternal Grandfather, Paternal Grandmother, Paternal Grandfather, Maternal Aunt, Maternal Uncle, Paternal Aunt, Paternal Uncle          Tobacco Use    Smoking status: Former     Current packs/day: 0.00     Types: Cigarettes     Start date: 2006     Quit date: 2015     Years since quittin.9    Smokeless tobacco: Never    Tobacco comments:     marijuana   Substance and Sexual Activity    Alcohol use: No     Alcohol/week: 0.0 standard drinks of alcohol    Drug use: Yes     Frequency: 7.0 times per week     Types: Marijuana    Sexual activity: Yes     Partners: Female     Review of Systems   Constitutional: Negative.   HENT: Negative.     Cardiovascular:  Positive for chest pain. Negative for claudication, cyanosis, dyspnea on exertion, irregular heartbeat, leg swelling, near-syncope, orthopnea, palpitations, paroxysmal nocturnal dyspnea and syncope.   Respiratory: Negative.     Endocrine: Negative.    Musculoskeletal: Negative.    Gastrointestinal: Negative.    Genitourinary: Negative.    Neurological: Negative.      Objective:     Vital Signs (Most Recent):  Temp: 98.2 °F (36.8 °C) (24)  Pulse: 83 (24)  Resp: 18 (24)  BP: (!) 153/91 (24)  SpO2: (!) 94 % (24) Vital Signs (24h Range):  Temp:  [97.9 °F (36.6 °C)-98.5 °F (36.9 °C)] 98.2 °F (36.8 °C)  Pulse:  [78-99]  83  Resp:  [16-20] 18  SpO2:  [94 %-98 %] 94 %  BP: (127-172)/() 153/91     Weight: 81.6 kg (180 lb)  Body mass index is 29.05 kg/m².    SpO2: (!) 94 %       No intake or output data in the 24 hours ending 05/01/24 0140    Lines/Drains/Airways       Peripheral Intravenous Line  Duration                  Peripheral IV - Single Lumen 04/30/24 1903 20 G Right Antecubital <1 day         Peripheral IV - Single Lumen 04/30/24 2218 20 G Left Antecubital <1 day                     Physical Exam  Vitals and nursing note reviewed.   Constitutional:       General: He is not in acute distress.     Appearance: Normal appearance. He is normal weight. He is not ill-appearing or diaphoretic.   HENT:      Head: Normocephalic and atraumatic.   Eyes:      Pupils: Pupils are equal, round, and reactive to light.   Cardiovascular:      Rate and Rhythm: Normal rate and regular rhythm.      Pulses: Normal pulses.      Heart sounds: Normal heart sounds.   Pulmonary:      Effort: Pulmonary effort is normal.      Breath sounds: Normal breath sounds.   Abdominal:      General: Abdomen is flat. Bowel sounds are normal. There is no distension.      Palpations: Abdomen is soft. There is no mass.      Tenderness: There is no abdominal tenderness.   Musculoskeletal:         General: Normal range of motion.   Skin:     General: Skin is warm.      Capillary Refill: Capillary refill takes less than 2 seconds.   Neurological:      General: No focal deficit present.      Mental Status: He is alert and oriented to person, place, and time.          Significant Labs:     Recent Labs   Lab 04/30/24 1921   WBC 11.48   HGB 16.7   HCT 52.4          Recent Labs   Lab 04/30/24 1921      K 4.0      CO2 21*   BUN 13   CREATININE 1.3   CALCIUM 10.0       Recent Labs   Lab 04/30/24 1921   ALKPHOS 92   BILITOT 1.5*   PROT 7.9   ALT 20   AST 28       Recent Labs   Lab 04/30/24 2106   CHOL 158   TRIG 84   HDL 37*     Lab Results   Component  Value Date    CHOL 158 04/30/2024    HDL 37 (L) 04/30/2024    LDLCALC 104.2 04/30/2024    TRIG 84 04/30/2024       Recent Labs   Lab 04/30/24  1921 04/30/24  2215   TROPONINI 2.121* 2.104*     Lab Results   Component Value Date     (H) 04/30/2024    BNP 79 03/23/2023     (H) 07/07/2022       Significant Imaging:     EKG 05/01/2024 and 04/30/2024  - Normal sinus rhythm, SARAH, Left axis deviation, RBBB, lateral changes consistent with ischemia    TTE 03/2023  CONGENITAL CARDIAC HISTORY:  Tetralogy of Fallot s/p repair  23-mm pulmonary homograft in pulmonary position ? 7/7/2006.  Left ventricular pacing wires.  Recurrent severe right ventricular hypertension: Multiple balloon dilations of pulmonary homograft (last 2010)  Anne valve 7/2015     Basic Anatomical Connections:  Abdominal situs is solitus.   Atrial situs is normal.   Atrioventricular concordance.   Grossly normal tricuspid and mitral valve structure.   RV dilation and RV hypertrophy.   Ventriculoarterial concordance.   Aortic valve is normal and pulmonic valve is abnormal.   Ventricular loop: D-loop.   Cardiac position is levocardia.   Left aortic arch branching.   No ventricular septal defect present.        IMPRESSION:  Qualitative minimal improvement in LV systolic function compared previous report-  Qualitative impression of mildly dilated right atrium  No obvious signs of previously reported dilation of the tricuspid annulus.  There is trivial to mild tricuspid insufficiency.  Mild right ventricle hypertrophy with moderate dilation.  Qualitative impression mildly reduced systolic function of the inlet and apical segments of the right ventricle with large outflow tract patch.  Inadequate Doppler profile of tricuspid insufficiency to estimate right ventricular systolic pressure.  Echodensity consistent with patch repair of ventricular septal defect and malalignment of the ventricular septum with no residual shunt demonstrated.  Images  consistent with Anne implant in pulmonary homograft with peak velocity <2.2 m/sec and mean <12 mm Hg.  No significant pulmonary insufficiency.  Qualitative impression of dilated right pulmonary artery with continuous-wave Doppler suggesting mild acceleration in the distal LPA..  The left atrial volume index is moderately enlarged measuring 46 ml/m2.   Trivial mitral valve insufficiency.  Qualitative impression of mild concentric left ventricular hypertrophy.  Mildly paradoxical septal motion with good movement of the LV free wall, SF = 27% and EF estimated 40 -45% from off axis apical views.  Trivial aortic valve insufficiency.  Aortic dimensions:  Sinuses of Valsalva = 39 mm.  ST junction             = 31 mm.  Ascending aorta     = 32 mm.  Normal left aortic arch with no evidence of coarctation.  No pericardial effusion.    Cath 7/23/15:  IMPRESSION:  1. Repaired tetralogy of Fallot with severe homograft stenosis, peak gradient 40 mmHg.  2. Right and left ventricular dysfunction and low cardiac output, normal pulmonary vascular resistance calculations.  3. Right aortic arch.  4. Right coronary artery from left sinus of Valsalva.  5. RV to PA conduit dilation with Reina 16, 18, 20 and 22 mm balloons.  6. RV to pulmonary artery conduit stent with Palmaz [id number] on 22 x 4 BIB (x3), postdilated with Reina 22 mm balloon (16 atmospheres).  7. Anne valve implantation on 22-Tajik Ensemble, residual gradient 9 mmHg, trivial insufficiency.    Assessment and Plan:     NSTEMI (non-ST elevated myocardial infarction)  NSTEMI - ISAI 3 (13%), WING 64 (1.2%)  - Risk factors and established atherosclerotic disease: Young patient with congenital heart disease, no major atherosclerotic risk factors  - Diagnosis - Atypical chest pain (burning and tightness on right side of the chest), EKG shows left sided ST-T wave changes possible for ischemia, and myocardial injury is evidenced by trop of 2.121 and 2.104 (at OSH 1.85)  - Nitro  "sublingual with mild improvement of symptoms  - Due to nonspecific symptoms, and burning in nature, with some complain of "acid" in the back of his throat, will attempt GI cocktail    - Admit to CICU  - Follow up studies - serial troponin  - Ordered TTE  - Telemetry to monitor for ventricular arrhythmias  - ACS protocol: Continue ASA 81mg qd, heparin gtt, atorvastatin 80mg qhs  - GDMT: Carvedilol  - Anti-ischemic medications: Nitrates sublingual and morphine PRN  - If patient fails medical therapy will consider invasive approach     Adult congenital heart disease  1. 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   - epicardial left ventricular pacing wires in place.   3. Severe homograft obstruction - now s/p RVOT stents and Anne valve 7/23/15 with excellent result  - valve working very well on March 2023 echo  4. Mild pulmonary artery hypertension noted in the past.   5. Trace aortic insufficiency and mild aortic root enlargement    - Followed by congenital disease cardiology    HFrEF (heart failure with reduced ejection fraction)  #HFrEF 40%, NYHA I, ACC/AHA class B/C:  - Etiology: Likely structural Heart Disease   - Hemodynamic profile: Perfused and appears euvolemic but elevated BNP (on Entresto) and CXR with prominence of the pulmonary vascular with bilateral pattern of pulmonary infiltrates  - Difficult compliance as per outpatient cardiology notes, probably taking meds only once a day  - Home GDMT: Carvedilol 25mg bid, Entresto 97-103mg bid, spironolactone held when entresto was initiated    - Ordered NT-ProBNP  - Repeat TTE  - Volume status optimization  - Furosemide 80mg once given, evaluate need of additional dosing  - Daily monitoring: Standing weights, Strict I/O, Na restriction (<2g/day), Keep K>4 and Mg>2  - GDMT: Continue Valsartan/Sacubitril (97/103mg bid), Carvedilol 25mg bid  - " Not requiring vasodilator or inotropic support for now    Essential hypertension  - Poor compliance in the past with medications as per cardiologist note  - Strong family history of hypertension.  - Outpatient on amlodipine 10mg, carvedilol 25mg bid, hydralazine 50mg bid and on entresto 97/103mg for HF       - Continue with outpatient medications        VTE Risk Mitigation (From admission, onward)           Ordered     IP VTE HIGH RISK PATIENT  Once         04/30/24 2355     Place sequential compression device  Until discontinued         04/30/24 2355     heparin 25,000 units in dextrose 5% (100 units/ml) IV bolus from bag LOW INTENSITY nomogram - [facility]  As needed (PRN)        Question:  Heparin Infusion Adjustment (DO NOT MODIFY ANSWER)  Answer:  \\[email or web address]\epic\Images\Pharmacy\HeparinInfusions\heparin LOW INTENSITY nomogram for [facility] [id number].pdf    04/30/24 2050     heparin 25,000 units in dextrose 5% (100 units/ml) IV bolus from bag LOW INTENSITY nomogram - [facility]  As needed (PRN)        Question:  Heparin Infusion Adjustment (DO NOT MODIFY ANSWER)  Answer:  \\[email or web address]\epic\Images\Pharmacy\HeparinInfusions\heparin LOW INTENSITY nomogram for [facility] [id number]    04/30/24 2050     heparin 25,000 units in dextrose 5% 250 mL (100 units/mL) infusion LOW INTENSITY nomogram - [facility]  Continuous        Question:  Begin at (units/kg/hr)  Answer:  12 04/30/24 2050                    Kyler San MD  Cardiology   [facility] - Cardiac [facility]

## 2024-05-01 NOTE — SUBJECTIVE & OBJECTIVE
Interval History:    Pt resting comfortably during interview/exam and denies any cardiac sx. He reports that he has not experienced chest pain since approx midnight last night. He says that he had been feeling chest pain for a few days that had started at rest. Mom and girlfriend (Mariia) at bedside.      Past Medical History:   Diagnosis Date    Aortic insufficiency     trace    Homograft cardiac valve stenosis     mild    Hypertension     Left ventricular dysfunction     Pulmonary artery hypertension     RV hypertension    Right ventricular dilation     S/P TOF (tetralogy of Fallot) repair 12/18/2014    TOF (tetralogy of Fallot)        Past Surgical History:   Procedure Laterality Date    balloon dilation of pulmonary atrery homograft  2008    CARDIAC VALVE SURGERY      7/23/15 @ [facility] [place]    epicardial pacing wires      LV    pulmonary valve replacement  July 7, 2006    TETRALOGY OF FALLOT REPAIR  1990       Review of patient's allergies indicates:  No Known Allergies    Current Facility-Administered Medications   Medication Dose Route Frequency Provider Last Rate Last Admin    amLODIPine tablet 10 mg  10 mg Oral Daily Kyler Alberto MD   10 mg at 05/01/24 0846    aspirin chewable tablet 81 mg  81 mg Oral Daily Kyler Alberto MD   81 mg at 05/01/24 0846    atorvastatin tablet 80 mg  80 mg Oral QHS Kyler Alberto MD        carvediloL tablet 25 mg  25 mg Oral BID Kyler Alberto MD   25 mg at 05/01/24 0846    furosemide injection 80 mg  80 mg Intravenous Q12H Jonatan MD   80 mg at 05/01/24 1025    heparin 25,000 units in dextrose 5% (100 units/ml) IV bolus from bag LOW INTENSITY nomogram - [facility]  56.4 Units/kg (Adjusted) Intravenous PRN Kyler Alberto MD   3,990 Units at 05/01/24 0506    heparin 25,000 units in dextrose 5% (100 units/ml) IV bolus from bag LOW INTENSITY nomogram - [facility]  30 Units/kg (Adjusted) Intravenous PRN Jodie  Kyler San MD        heparin 25,000 units in dextrose 5% 250 mL (100 units/mL) infusion LOW INTENSITY nomogram - [facility]  0-40 Units/kg/hr (Adjusted) Intravenous Continuous Kyler Alberto MD 10.6 mL/hr at 24 1205 15 Units/kg/hr at 24 1205    hydrALAZINE tablet 50 mg  50 mg Oral Q12H Kyler Alberto MD   50 mg at 24 0846    melatonin tablet 9 mg  9 mg Oral Nightly PRN Kyler Alberto MD   9 mg at 24 0357    nitroGLYCERIN in 5 % dextrose 50 mg/250 mL (200 mcg/mL) infusion  0-400 mcg/min Intravenous Continuous Kyler Alberto MD 1.5 mL/hr at 24 0403 5 mcg/min at 24 0403    sacubitriL-valsartan  mg per tablet 1 tablet  1 tablet Oral BID Kyler Alberto MD   1 tablet at 24 0845    sodium chloride 0.9% flush 10 mL  10 mL Intravenous PRN Kyler Alberto MD         Family History       Problem Relation (Age of Onset)    Hypertension Mother    No Known Problems Father, Sister, Brother, Maternal Grandmother, Maternal Grandfather, Paternal Grandmother, Paternal Grandfather, Maternal Aunt, Maternal Uncle, Paternal Aunt, Paternal Uncle          Tobacco Use    Smoking status: Former     Current packs/day: 0.00     Types: Cigarettes     Start date: 2006     Quit date: 2015     Years since quittin.9    Smokeless tobacco: Never    Tobacco comments:     marijuana   Substance and Sexual Activity    Alcohol use: No     Alcohol/week: 0.0 standard drinks of alcohol    Drug use: Yes     Frequency: 7.0 times per week     Types: Marijuana    Sexual activity: Yes     Partners: Female     Objective:     Vital Signs (Most Recent):  Temp: 98.2 °F (36.8 °C) (24 0705)  Pulse: 82 (24 1205)  Resp: 16 (24 1205)  BP: (!) 117/57 (24 1205)  SpO2: 95 % (24 1205) Vital Signs (24h Range):  Temp:  [97.9 °F (36.6 °C)-98.6 °F (37 °C)] 98.2 °F (36.8 °C)  Pulse:  [78-99] 82  Resp:   [15-32] 16  SpO2:  [94 %-98 %] 95 %  BP: (117-172)/() 117/57     Weight: 98.5 kg (217 lb 2.5 oz)  Body mass index is 35.05 kg/m².    SpO2: 95 %         Intake/Output Summary (Last 24 hours) at 5/1/2024 1320  Last data filed at 5/1/2024 1205  Gross per 24 hour   Intake 708.8 ml   Output 1250 ml   Net -541.2 ml       Lines/Drains/Airways       Peripheral Intravenous Line  Duration                  Peripheral IV - Single Lumen 04/30/24 1903 20 G Right Antecubital <1 day         Peripheral IV - Single Lumen 04/30/24 2218 20 G Left Antecubital <1 day                     Physical Exam  Constitutional:       General: He is not in acute distress.     Appearance: He is obese. He is not ill-appearing.   HENT:      Head: Normocephalic and atraumatic.   Eyes:      Extraocular Movements: Extraocular movements intact.      Pupils: Pupils are equal, round, and reactive to light.   Cardiovascular:      Rate and Rhythm: Normal rate and regular rhythm.      Pulses: Normal pulses.      Comments: No murmur hear on auscultation. Fixed & split S2 sound  Pulmonary:      Effort: Pulmonary effort is normal.      Breath sounds: Normal breath sounds.   Abdominal:      General: There is no distension.      Palpations: Abdomen is soft.      Tenderness: There is no abdominal tenderness.   Musculoskeletal:      Cervical back: Normal range of motion and neck supple.   Skin:     General: Skin is warm.      Capillary Refill: Capillary refill takes less than 2 seconds.   Neurological:      General: No focal deficit present.      Mental Status: He is oriented to person, place, and time.   Psychiatric:         Mood and Affect: Mood normal.         Behavior: Behavior normal.          Significant Labs:   Recent Labs   Lab 04/30/24 1921 05/01/24  0326   * 84    140   K 4.0 3.1*    110   CO2 21* 20*   BUN 13 13   CREATININE 1.3 0.9   CALCIUM 10.0 7.8*   MG  --  1.4*   , CMP   Recent Labs   Lab 04/30/24 1921 05/01/24  0326   NA  137 140   K 4.0 3.1*    110   CO2 21* 20*   * 84   BUN 13 13   CREATININE 1.3 0.9   CALCIUM 10.0 7.8*   PROT 7.9  --    ALBUMIN 3.8  --    BILITOT 1.5*  --    ALKPHOS 92  --    AST 28  --    ALT 20  --    ANIONGAP 11 10   , CBC   Recent Labs   Lab 04/30/24  1921 05/01/24  0326   WBC 11.48 9.86   HGB 16.7 16.2   HCT 52.4 49.4    260   , INR   Recent Labs   Lab 04/30/24  2106   INR 1.2   , Lipid Panel   Recent Labs   Lab 04/30/24  2106   CHOL 158   HDL 37*   LDLCALC 104.2   TRIG 84   CHOLHDL 23.4   ,   Pathology Results  (Last 10 years)      None        , Troponin   Recent Labs   Lab 04/30/24  2215 05/01/24  0326 05/01/24  1154   TROPONINI 2.104* 2.676* 3.371*   , and   Recent Lab Results  (Last 5 results in the past 24 hours)        05/01/24  1154   05/01/24  1031   05/01/24  0855   05/01/24  0326   05/01/24  0019        Anion Gap       10         Ao peak romi     0.51           Ao VTI     7.24           PTT   41.9  Comment: Refer to local heparin nomogram for intensity/dose specific   therapeutic   range.       31.2  Comment: Refer to local heparin nomogram for intensity/dose specific   therapeutic   range.           AV valve area     6.41           SANKET by Velocity Ratio     4.20           AV mean gradient     1           AV index (prosthetic)     1.32           AV peak gradient     1           AV Velocity Ratio     0.86           Baso #       0.05         Basophil %       0.5         BSA     2.14           BUN       13         Calcium       7.8         Chloride       110         CO2       20         Creatinine       0.9         Left Ventricle Relative Wall Thickness     0.39           Differential Method       Automated         E/E' ratio     21.43           eGFR       >60.0         EF     18           Eos #       0.6         Eos %       6.0         FS     22           Glucose       84         Gran # (ANC)       6.6         Gran %       66.9         Hematocrit       49.4         Hemoglobin        16.2         Immature Grans (Abs)       0.04  Comment: Mild elevation in immature granulocytes is non specific and   can be seen in a variety of conditions including stress response,   acute inflammation, trauma and pregnancy. Correlation with other   laboratory and clinical findings is essential.           Immature Granulocytes       0.4         IVSd     0.91           LA WIDTH     2.86           Lactic Acid Level         1.0  Comment: Falsely low lactic acid results can be found in samples   containing >=13.0 mg/dL total bilirubin and/or >=3.5 mg/dL   direct bilirubin.         Left Atrium Major Axis     5.80           Left Atrium Minor Axis     5.80           LA size     3.06           LA volume     43.15                35.18           LA vol index     20.8           LA Volume Index (Mod)     17.0           LVOT area     4.9           LV LATERAL E/E' RATIO     18.75           LV SEPTAL E/E' RATIO     25.00           LV EDV BP     146.46           LV Diastolic Volume Index     70.75           LVIDd     5.48           LVIDs     4.25           LV mass     209.09           LV Mass Index     101           LVOT diameter     2.49           LVOT peak celso     0.44           LVOT stroke volume     46.43           LVOT peak VTI     9.54           LV ESV BP     80.68           LV Systolic Volume Index     39.0           Lymph #       1.6         Lymph %       15.7         Magnesium        1.4         MCH       27.6         MCHC       32.8         MCV       84         Mean e'     0.04           Mono #       1.0         Mono %       10.5         MPV       10.7         MV Peak E Celso     0.75           nRBC       0         Phosphorus Level       3.2         Platelet Count       260         Potassium       3.1         Posterior Wall     1.07           RA Major Axis     6.72           Est. RA pres     8           RA Width     5.23           RBC       5.86         RDW       13.7         RV/LV Ratio     0.63           RVDD     3.43            Sinus     4.12           Sodium       140         TAPSE     1.23           TDI SEPTAL     0.03           TDI LATERAL     0.04           Troponin I 3.371  Comment: The reference interval for Troponin I represents the 99th percentile   cutoff   for our facility and is consistent with 3rd generation assay   performance.         2.676  Comment: The reference interval for Troponin I represents the 99th percentile   cutoff   for our facility and is consistent with 3rd generation assay   performance.           WBC       9.86         ZLVIDD     -1.38           ZLVIDS     0.78                                  Significant Imaging:     Echo (5/1/24 at 8:55 AM)      Left Ventricle: Ventricular mass is normal. Mildly increased wall thickness. Severe global hypokinesis present. Septal motion is abnormal. There is severely reduced systolic function with a visually estimated ejection fraction of 15 - 20%. Ejection fraction by visual approximation is 18%. The biplane LVEF was much higher but not accurate due to technical issues. There is diastolic dysfunction but grade cannot be determined.    Right Ventricle: Systolic function is moderately reduced.    Right Atrium: Right atrium is moderately dilated.    IVC/SVC: Intermediate venous pressure at 8 mmHg.    Echo (5/1/24 at 1:38 PM)    Repeat echo to better image pulmonary valve. Pending official report. Initial read of echo below:      RV looks okay with mild tricuspid insufficiency    Mild pulmonary stenosis    No significant pulmonary insufficiency, appears to different than echos in the past    Pulmonary valve seems to functioning well

## 2024-05-01 NOTE — ASSESSMENT & PLAN NOTE
"NSTEMI - ISAI 3 (13%), WING 64 (1.2%)  - Risk factors and established atherosclerotic disease: Young patient with congenital heart disease, no major atherosclerotic risk factors  - Diagnosis - Atypical chest pain (burning and tightness on right side of the chest), EKG shows left sided ST-T wave changes possible for ischemia, and myocardial injury is evidenced by trop of 2.121 and 2.104 (at OSH 1.85)  - Nitro sublingual with mild improvement of symptoms  - Due to nonspecific symptoms, and burning in nature, with some complain of "acid" in the back of his throat, will attempt GI cocktail    - Admit to CICU  - Follow up studies - serial troponin  - Ordered TTE  - Telemetry to monitor for ventricular arrhythmias  - ACS protocol: Continue ASA 81mg qd, heparin gtt, atorvastatin 80mg qhs  - GDMT: Carvedilol  - Anti-ischemic medications: Nitrates sublingual and morphine PRN  - If patient fails medical therapy will consider invasive approach   "

## 2024-05-01 NOTE — ED NOTES
Assumed care of the patient. Report received from Davin. Pt on continuous cardiac monitoring, continuous pulse oximetry, and automatic BP cuff cycling Q15min. Pt in hospital gown, side rails up X2, bed low and locked, and call light is placed within reach. 1 family/visitors at bedside at this time. Pt complains of chest pain that radiates across chest.  Patient states he is hungry and wants to eat.  Patient educated that we need to wait until he sees the doctor before eating.

## 2024-05-01 NOTE — ED PROVIDER NOTES
Encounter Date: 4/30/2024       History     Chief Complaint   Patient presents with    Abnormal Labs     Arrival via ems coming from [facility], coming in for elevated Troponin levels.     Chest Pain     Complaint of  chest pain     Mr. Horvath is a 32yo M w/ a hx of Tetrology of fallot s/p repair 2015, HTN, prsenting to the hospital for chest pain. Patient reports that the chest pain started yesterday, was right sided, squeezing in nature. Patient reports that right now it is a 7/10. Patient was lying down when it started. Patient reported it wasn't constant but would come and go ~3-4 times throughout the day. Patient reports he took his prescription medication and went to sleep. Patient reports that he was still having chest pain, so went to OSH. Patient had blood work done there w/ evidence of elevated troponin concerning for NSTEMI, patient given aspirin load and transferred for interventional cardiology intervention. Patient reports he has been having cough and congestion that has been coming and going over the past week.         Review of patient's allergies indicates:  No Known Allergies  Past Medical History:   Diagnosis Date    Aortic insufficiency     trace    Homograft cardiac valve stenosis     mild    Hypertension     Left ventricular dysfunction     Pulmonary artery hypertension     RV hypertension    Right ventricular dilation     S/P TOF (tetralogy of Fallot) repair 12/18/2014    TOF (tetralogy of Fallot)      Past Surgical History:   Procedure Laterality Date    balloon dilation of pulmonary atrery homograft  2008    [place]IAC [place] [facility] [place]      LV    pulmonary valve replacement  July 7, 2006    TETRALOGY OF FALLOT REPAIR  1990     Family History   Problem Relation Name Age of Onset    Hypertension Mother      No Known Problems Father      No Known Problems Sister 2     No Known Problems Brother 1     No Known Problems Maternal Grandmother      No Known  Problems Maternal Grandfather      No Known Problems Paternal Grandmother      No Known Problems Paternal Grandfather      No Known Problems Maternal Aunt      No Known Problems Maternal Uncle      No Known Problems Paternal Aunt      No Known Problems Paternal Uncle      Anemia Neg Hx      Arrhythmia Neg Hx      Asthma Neg Hx      Clotting disorder Neg Hx      Fainting Neg Hx      Heart attack Neg Hx      Heart disease Neg Hx      Heart failure Neg Hx      Hyperlipidemia Neg Hx      Stroke Neg Hx      Atrial Septal Defect Neg Hx       Social History     Tobacco Use    Smoking status: Former     Current packs/day: 0.00     Types: Cigarettes     Start date: 2006     Quit date: 2015     Years since quittin.8    Smokeless tobacco: Never    Tobacco comments:     marijuana   Substance Use Topics    Alcohol use: No     Alcohol/week: 0.0 standard drinks of alcohol    Drug use: Yes     Frequency: 7.0 times per week     Types: Marijuana     Review of Systems   Constitutional:  Negative for chills and fever.   HENT:  Positive for sore throat.    Respiratory:  Positive for chest tightness. Negative for cough and shortness of breath.    Cardiovascular:  Positive for chest pain. Negative for palpitations and leg swelling.   Gastrointestinal:  Negative for abdominal pain, constipation, diarrhea, nausea and vomiting.   Genitourinary:  Negative for dysuria and urgency.   Skin:  Negative for rash.   Neurological:  Negative for headaches.   Psychiatric/Behavioral:  Negative for confusion.        Physical Exam     Initial Vitals [24 1839]   BP Pulse Resp Temp SpO2   (!) 172/100 78 16 98.5 °F (36.9 °C) 98 %      MAP       --         Physical Exam    Constitutional: He appears well-developed.   HENT:   Head: Normocephalic and atraumatic.   Eyes: EOM are normal. Pupils are equal, round, and reactive to light.   Neck:   Normal range of motion.  Cardiovascular:  Normal rate.           Pulmonary/Chest: Breath sounds  normal.   Abdominal: Abdomen is soft.   Musculoskeletal:         General: Normal range of motion.      Cervical back: Normal range of motion.     Neurological: He is alert and oriented to person, place, and time.   Skin: Skin is warm. Capillary refill takes less than 2 seconds.         ED Course   Procedures  Labs Reviewed   CBC W/ AUTO DIFFERENTIAL - Abnormal; Notable for the following components:       Result Value    MCHC 31.9 (*)     Gran # (ANC) 9.1 (*)     Gran % 78.9 (*)     Lymph % 11.0 (*)     All other components within normal limits   COMPREHENSIVE METABOLIC PANEL - Abnormal; Notable for the following components:    CO2 21 (*)     Glucose 122 (*)     Total Bilirubin 1.5 (*)     All other components within normal limits   TROPONIN I - Abnormal; Notable for the following components:    Troponin I 2.121 (*)     All other components within normal limits   HIV 1 / 2 ANTIBODY   HEPATITIS C ANTIBODY   B-TYPE NATRIURETIC PEPTIDE   POCT TROPONIN   POCT TROPONIN          Imaging Results    None          Medications - No data to display  Medical Decision Making  Patient w/ pressure-like substernal chest pain that is constant in nature w/ elevated troponin ~2, without EKG changes concerning for NSTEMI. Patient elevated ISAI score of 3 indicating need for medical management w or w/out interventional cardiology intervention. Discussed case w/ cardiology and HTS, patient started on medical management for ACS pending their formal evaluation  Patient admitted to cardiology for further management given NSTEMI    Amount and/or Complexity of Data Reviewed  Labs: ordered. Decision-making details documented in ED Course.  Radiology: ordered.    Risk  Prescription drug management.  Decision regarding hospitalization.               ED Course as of 04/30/24 2021 Tue Apr 30, 2024 2020 Troponin I(!): 2.121 [AC]   2020 Cardiology to be notified. [AC]      ED Course User Index  [AC] Alex Cash DO                            Clinical Impression:  Final diagnoses:  [R07.9] Chest pain                 Indra Coreas MD  Resident  05/01/24 0048

## 2024-05-01 NOTE — ASSESSMENT & PLAN NOTE
1. 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   - epicardial left ventricular pacing wires in place.   3. Severe homograft obstruction - now s/p RVOT stents and Anne valve 7/23/15 with excellent result  - valve working very well on March 2023 echo  4. Mild pulmonary artery hypertension noted in the past.   5. Trace aortic insufficiency and mild aortic root enlargement    - Followed by congenital disease cardiology   [de-identified] : This time patient has left shoulder rotator cuff tendinitis \par We will try conservative treatment NSAIDs therapy range of motion strengthening.\par \par Patient is also considering left shoulder injection\par Informed verbal consent was obtained from the patient. Patient understands the risks and benefits of injection.\par A posterior portal site was used. Skin was cleaned with a Betadine. Local anesthetic spray was used. 2 cc of Depo-Medrol 2 cc of lidocaine was injected in the shoulder and subacromial space. Band-Aid was applied. Patient tolerated the procedure well.\par \par Follow-up in 6 months

## 2024-05-02 PROBLEM — N17.9 AKI (ACUTE KIDNEY INJURY): Status: ACTIVE | Noted: 2024-05-02

## 2024-05-02 LAB
ABDOMINAL AORTA MID EDV: 11 CM/S
ABDOMINAL AORTA MID PSV: 83 CM/S
ALBUMIN SERPL BCP-MCNC: 3.2 G/DL (ref 3.5–5.2)
ALP SERPL-CCNC: 82 U/L (ref 55–135)
ALT SERPL W/O P-5'-P-CCNC: 17 U/L (ref 10–44)
ANION GAP SERPL CALC-SCNC: 12 MMOL/L (ref 8–16)
ANION GAP SERPL CALC-SCNC: 15 MMOL/L (ref 8–16)
APTT PPP: 42.8 SEC (ref 21–32)
APTT PPP: 47.8 SEC (ref 21–32)
AST SERPL-CCNC: 31 U/L (ref 10–40)
BASOPHILS # BLD AUTO: 0.08 K/UL (ref 0–0.2)
BASOPHILS NFR BLD: 0.7 % (ref 0–1.9)
BILIRUB SERPL-MCNC: 0.7 MG/DL (ref 0.1–1)
BUN SERPL-MCNC: 27 MG/DL (ref 6–20)
BUN SERPL-MCNC: 32 MG/DL (ref 6–20)
CALCIUM SERPL-MCNC: 9.3 MG/DL (ref 8.7–10.5)
CALCIUM SERPL-MCNC: 9.8 MG/DL (ref 8.7–10.5)
CHLORIDE SERPL-SCNC: 100 MMOL/L (ref 95–110)
CHLORIDE SERPL-SCNC: 101 MMOL/L (ref 95–110)
CO2 SERPL-SCNC: 20 MMOL/L (ref 23–29)
CO2 SERPL-SCNC: 23 MMOL/L (ref 23–29)
CREAT SERPL-MCNC: 1.6 MG/DL (ref 0.5–1.4)
CREAT SERPL-MCNC: 2 MG/DL (ref 0.5–1.4)
DIFFERENTIAL METHOD BLD: ABNORMAL
EOSINOPHIL # BLD AUTO: 0.6 K/UL (ref 0–0.5)
EOSINOPHIL NFR BLD: 5.2 % (ref 0–8)
ERYTHROCYTE [DISTWIDTH] IN BLOOD BY AUTOMATED COUNT: 13.6 % (ref 11.5–14.5)
EST. GFR  (NO RACE VARIABLE): 44.1 ML/MIN/1.73 M^2
EST. GFR  (NO RACE VARIABLE): 57.6 ML/MIN/1.73 M^2
GLUCOSE SERPL-MCNC: 114 MG/DL (ref 70–110)
GLUCOSE SERPL-MCNC: 128 MG/DL (ref 70–110)
HCT VFR BLD AUTO: 52.4 % (ref 40–54)
HGB BLD-MCNC: 16.6 G/DL (ref 14–18)
IMM GRANULOCYTES # BLD AUTO: 0.04 K/UL (ref 0–0.04)
IMM GRANULOCYTES NFR BLD AUTO: 0.3 % (ref 0–0.5)
LEFT RENAL DIST DIAS: 16 CM/S
LEFT RENAL DIST SYS: 48 CM/S
LEFT RENAL ULTRASOUND ACCELERATION TIME MEASUREMENT 1: 46 MS
LEFT RENAL ULTRASOUND ACCELERATION TIME MEASUREMENT 2: 40 MS
LEFT RENAL ULTRASOUND ACCELERATION TIME MEASUREMENT 3: 51 MS
LEFT RENAL ULTRASOUND ACCELERATION TIME MEASUREMENT AVERAGE: 51 MS
LEFT RENAL ULTRASOUND KIDNEY SIZE MEASUREMENT 1: 10.1 CM
LEFT RENAL ULTRASOUND KIDNEY SIZE MEASUREMENT AVERAGE: 10.1 CM
LEFT RENAL ULTRASOUND RESISTIVE INDEX MEASUREMENT 1: 0.73
LEFT RENAL ULTRASOUND RESISTIVE INDEX MEASUREMENT 2: 0.71
LEFT RENAL ULTRASOUND RESISTIVE INDEX MEASUREMENT 3: 0.68
LEFT RENAL ULTRASOUND RESISTIVE INDEX MEASUREMENT AVERAGE: 0.73
LYMPHOCYTES # BLD AUTO: 1.9 K/UL (ref 1–4.8)
LYMPHOCYTES NFR BLD: 15.9 % (ref 18–48)
MAGNESIUM SERPL-MCNC: 2.3 MG/DL (ref 1.6–2.6)
MCH RBC QN AUTO: 27.4 PG (ref 27–31)
MCHC RBC AUTO-ENTMCNC: 31.7 G/DL (ref 32–36)
MCV RBC AUTO: 87 FL (ref 82–98)
MONOCYTES # BLD AUTO: 1.2 K/UL (ref 0.3–1)
MONOCYTES NFR BLD: 10.1 % (ref 4–15)
NEUTROPHILS # BLD AUTO: 8.3 K/UL (ref 1.8–7.7)
NEUTROPHILS NFR BLD: 67.8 % (ref 38–73)
NRBC BLD-RTO: 0 /100 WBC
NT-PROBNP SERPL IA-MCNC: 1078 PG/ML
OHS CV LEFT RENAL RAR: 0.58
OHS CV RIGHT RENAL RAR: 1.12
OHS CV US LEFT RENAL HIGHEST EDV: 16
OHS CV US LEFT RENAL HIGHEST PSV: 48
OHS CV US RIGHT RENAL HIGHEST EDV: 18
OHS CV US RIGHT RENAL HIGHEST PSV: 93
OHS QRS DURATION: 170 MS
OHS QTC CALCULATION: 554 MS
PHOSPHATE SERPL-MCNC: 5.7 MG/DL (ref 2.7–4.5)
PLATELET # BLD AUTO: 275 K/UL (ref 150–450)
PMV BLD AUTO: 11 FL (ref 9.2–12.9)
POTASSIUM SERPL-SCNC: 3.8 MMOL/L (ref 3.5–5.1)
POTASSIUM SERPL-SCNC: 3.9 MMOL/L (ref 3.5–5.1)
PROT SERPL-MCNC: 7.1 G/DL (ref 6–8.4)
RBC # BLD AUTO: 6.05 M/UL (ref 4.6–6.2)
RIGHT RENAL DIST DIAS: 18 CM/S
RIGHT RENAL DIST SYS: 93 CM/S
RIGHT RENAL ULTRASOUND ACCELERATION TIME MEASUREMENT 1: 71 MS
RIGHT RENAL ULTRASOUND ACCELERATION TIME MEASUREMENT 2: 66 MS
RIGHT RENAL ULTRASOUND ACCELERATION TIME MEASUREMENT 3: 63 MS
RIGHT RENAL ULTRASOUND ACCELERATION TIME MEASUREMENT AVERAGE: 71 MS
RIGHT RENAL ULTRASOUND KIDNEY SIZE MEASUREMENT 1: 10 CM
RIGHT RENAL ULTRASOUND KIDNEY SIZE MEASUREMENT AVERAGE: 10 CM
RIGHT RENAL ULTRASOUND RESISTIVE INDEX MEASUREMENT 1: 0.78
RIGHT RENAL ULTRASOUND RESISTIVE INDEX MEASUREMENT 2: 0.75
RIGHT RENAL ULTRASOUND RESISTIVE INDEX MEASUREMENT 3: 0.65
RIGHT RENAL ULTRASOUND RESISTIVE INDEX MEASUREMENT AVERAGE: 0.78
SODIUM SERPL-SCNC: 135 MMOL/L (ref 136–145)
SODIUM SERPL-SCNC: 136 MMOL/L (ref 136–145)
TROPONIN I SERPL DL<=0.01 NG/ML-MCNC: 3.05 NG/ML (ref 0–0.03)
TROPONIN I SERPL DL<=0.01 NG/ML-MCNC: 4.3 NG/ML (ref 0–0.03)
TROPONIN I SERPL DL<=0.01 NG/ML-MCNC: 4.55 NG/ML (ref 0–0.03)
TROPONIN I SERPL DL<=0.01 NG/ML-MCNC: 4.68 NG/ML (ref 0–0.03)
WBC # BLD AUTO: 12.22 K/UL (ref 3.9–12.7)

## 2024-05-02 PROCEDURE — 80053 COMPREHEN METABOLIC PANEL: CPT | Performed by: STUDENT IN AN ORGANIZED HEALTH CARE EDUCATION/TRAINING PROGRAM

## 2024-05-02 PROCEDURE — 82088 ASSAY OF ALDOSTERONE: CPT | Performed by: INTERNAL MEDICINE

## 2024-05-02 PROCEDURE — 84100 ASSAY OF PHOSPHORUS: CPT | Performed by: STUDENT IN AN ORGANIZED HEALTH CARE EDUCATION/TRAINING PROGRAM

## 2024-05-02 PROCEDURE — 85730 THROMBOPLASTIN TIME PARTIAL: CPT | Mod: 91 | Performed by: INTERNAL MEDICINE

## 2024-05-02 PROCEDURE — 25000003 PHARM REV CODE 250: Performed by: STUDENT IN AN ORGANIZED HEALTH CARE EDUCATION/TRAINING PROGRAM

## 2024-05-02 PROCEDURE — 21400001 HC TELEMETRY ROOM

## 2024-05-02 PROCEDURE — 84484 ASSAY OF TROPONIN QUANT: CPT | Mod: 91 | Performed by: STUDENT IN AN ORGANIZED HEALTH CARE EDUCATION/TRAINING PROGRAM

## 2024-05-02 PROCEDURE — 85730 THROMBOPLASTIN TIME PARTIAL: CPT | Performed by: STUDENT IN AN ORGANIZED HEALTH CARE EDUCATION/TRAINING PROGRAM

## 2024-05-02 PROCEDURE — 99231 SBSQ HOSP IP/OBS SF/LOW 25: CPT | Mod: ,,, | Performed by: PEDIATRICS

## 2024-05-02 PROCEDURE — 36415 COLL VENOUS BLD VENIPUNCTURE: CPT | Performed by: STUDENT IN AN ORGANIZED HEALTH CARE EDUCATION/TRAINING PROGRAM

## 2024-05-02 PROCEDURE — 99233 SBSQ HOSP IP/OBS HIGH 50: CPT | Mod: ,,, | Performed by: INTERNAL MEDICINE

## 2024-05-02 PROCEDURE — 99223 1ST HOSP IP/OBS HIGH 75: CPT | Mod: ,,, | Performed by: INTERNAL MEDICINE

## 2024-05-02 PROCEDURE — 99900035 HC TECH TIME PER 15 MIN (STAT)

## 2024-05-02 PROCEDURE — 63600175 PHARM REV CODE 636 W HCPCS: Performed by: STUDENT IN AN ORGANIZED HEALTH CARE EDUCATION/TRAINING PROGRAM

## 2024-05-02 PROCEDURE — 83735 ASSAY OF MAGNESIUM: CPT | Performed by: STUDENT IN AN ORGANIZED HEALTH CARE EDUCATION/TRAINING PROGRAM

## 2024-05-02 PROCEDURE — 85025 COMPLETE CBC W/AUTO DIFF WBC: CPT | Performed by: STUDENT IN AN ORGANIZED HEALTH CARE EDUCATION/TRAINING PROGRAM

## 2024-05-02 PROCEDURE — 80048 BASIC METABOLIC PNL TOTAL CA: CPT | Mod: XB | Performed by: STUDENT IN AN ORGANIZED HEALTH CARE EDUCATION/TRAINING PROGRAM

## 2024-05-02 PROCEDURE — 94761 N-INVAS EAR/PLS OXIMETRY MLT: CPT

## 2024-05-02 RX ORDER — POTASSIUM CHLORIDE 750 MG/1
30 CAPSULE, EXTENDED RELEASE ORAL ONCE
Status: DISCONTINUED | OUTPATIENT
Start: 2024-05-02 | End: 2024-05-02

## 2024-05-02 RX ORDER — POTASSIUM CHLORIDE 7.45 MG/ML
10 INJECTION INTRAVENOUS
Status: DISCONTINUED | OUTPATIENT
Start: 2024-05-02 | End: 2024-05-02

## 2024-05-02 RX ORDER — POTASSIUM CHLORIDE 20 MEQ/1
20 TABLET, EXTENDED RELEASE ORAL ONCE
Status: COMPLETED | OUTPATIENT
Start: 2024-05-02 | End: 2024-05-02

## 2024-05-02 RX ORDER — ACETAMINOPHEN 325 MG/1
650 TABLET ORAL EVERY 8 HOURS PRN
Status: DISCONTINUED | OUTPATIENT
Start: 2024-05-02 | End: 2024-05-07 | Stop reason: HOSPADM

## 2024-05-02 RX ORDER — FUROSEMIDE 10 MG/ML
80 INJECTION INTRAMUSCULAR; INTRAVENOUS ONCE
Status: DISCONTINUED | OUTPATIENT
Start: 2024-05-02 | End: 2024-05-02

## 2024-05-02 RX ORDER — CARVEDILOL 3.12 MG/1
3.12 TABLET ORAL 2 TIMES DAILY
Status: DISCONTINUED | OUTPATIENT
Start: 2024-05-02 | End: 2024-05-07

## 2024-05-02 RX ADMIN — ACETAMINOPHEN 650 MG: 325 TABLET ORAL at 07:05

## 2024-05-02 RX ADMIN — HYDRALAZINE HYDROCHLORIDE 50 MG: 50 TABLET ORAL at 08:05

## 2024-05-02 RX ADMIN — ATORVASTATIN CALCIUM 80 MG: 40 TABLET, FILM COATED ORAL at 08:05

## 2024-05-02 RX ADMIN — POTASSIUM CHLORIDE 10 MEQ: 7.46 INJECTION, SOLUTION INTRAVENOUS at 07:05

## 2024-05-02 RX ADMIN — FUROSEMIDE 10 MG/HR: 10 INJECTION, SOLUTION INTRAMUSCULAR; INTRAVENOUS at 02:05

## 2024-05-02 RX ADMIN — HEPARIN SODIUM AND DEXTROSE 18 UNITS/KG/HR: 10000; 5 INJECTION INTRAVENOUS at 08:05

## 2024-05-02 RX ADMIN — SACUBITRIL AND VALSARTAN 1 TABLET: 97; 103 TABLET, FILM COATED ORAL at 08:05

## 2024-05-02 RX ADMIN — CARVEDILOL 25 MG: 25 TABLET, FILM COATED ORAL at 08:05

## 2024-05-02 RX ADMIN — AMLODIPINE BESYLATE 10 MG: 10 TABLET ORAL at 08:05

## 2024-05-02 RX ADMIN — FUROSEMIDE 10 MG/HR: 10 INJECTION, SOLUTION INTRAMUSCULAR; INTRAVENOUS at 11:05

## 2024-05-02 RX ADMIN — ASPIRIN 81 MG CHEWABLE TABLET 81 MG: 81 TABLET CHEWABLE at 08:05

## 2024-05-02 RX ADMIN — POTASSIUM CHLORIDE 20 MEQ: 1500 TABLET, EXTENDED RELEASE ORAL at 08:05

## 2024-05-02 RX ADMIN — POTASSIUM CHLORIDE 20 MEQ: 1500 TABLET, EXTENDED RELEASE ORAL at 07:05

## 2024-05-02 RX ADMIN — CARVEDILOL 3.12 MG: 3.12 TABLET, FILM COATED ORAL at 08:05

## 2024-05-02 NOTE — SUBJECTIVE & OBJECTIVE
Past Medical History:   Diagnosis Date    Aortic insufficiency     trace    Homograft cardiac valve stenosis     mild    Hypertension     Left ventricular dysfunction     Pulmonary artery hypertension     RV hypertension    Right ventricular dilation     S/P TOF (tetralogy of Fallot) repair 12/18/2014    TOF (tetralogy of Fallot)      Past Surgical History:   Procedure Laterality Date    balloon dilation of pulmonary atrery homograft  2008    CARDIAC VALVE SURGERY      7/23/15 @ [facility] [place]    epicardial pacing wires      LV    pulmonary valve replacement  July 7, 2006    TETRALOGY OF FALLOT REPAIR  1990     Review of patient's allergies indicates:  No Known Allergies    Current Facility-Administered Medications   Medication Dose Route Frequency Provider Last Rate Last Admin    amLODIPine tablet 10 mg  10 mg Oral Daily Kyler Alberto MD   10 mg at 05/01/24 0846    aspirin chewable tablet 81 mg  81 mg Oral Daily Kyler Alberto MD   81 mg at 05/01/24 0846    atorvastatin tablet 80 mg  80 mg Oral QHS Kyler Alberto MD   80 mg at 05/01/24 2008    carvediloL tablet 25 mg  25 mg Oral BID Kyler Alberto MD   25 mg at 05/01/24 2008    furosemide (Lasix) 500 mg in 50 mL infusion (conc: 10 mg/mL)  10 mg/hr Intravenous Continuous Kyler Alberto MD 1 mL/hr at 05/02/24 0601 10 mg/hr at 05/02/24 0601    furosemide injection 80 mg  80 mg Intravenous Q12H Jonatan MD   80 mg at 05/01/24 2008    furosemide injection 80 mg  80 mg Intravenous Once Kyler Alberto MD        heparin 25,000 units in dextrose 5% (100 units/ml) IV bolus from bag LOW INTENSITY nomogram - [facility]  56.4 Units/kg (Adjusted) Intravenous PRN Kyler Alberto MD   3,990 Units at 05/01/24 1912    heparin 25,000 units in dextrose 5% (100 units/ml) IV bolus from bag LOW INTENSITY nomogram - [facility]  30 Units/kg (Adjusted) Intravenous PRN Kyler Alberto MD         heparin 25,000 units in dextrose 5% 250 mL (100 units/mL) infusion LOW INTENSITY nomogram - [facility]  0-40 Units/kg/hr (Adjusted) Intravenous Continuous Kyler Alberto MD 12.8 mL/hr at 24 0601 18 Units/kg/hr at 24 0601    hydrALAZINE tablet 50 mg  50 mg Oral Q12H Kyler Alberto MD   50 mg at 24    melatonin tablet 9 mg  9 mg Oral Nightly PRN Kyler Alberto MD   9 mg at 24 0357    nitroGLYCERIN in 5 % dextrose 50 mg/250 mL (200 mcg/mL) infusion  0-400 mcg/min Intravenous Continuous Kyler Alberto MD   Stopped at 24 1429    potassium chloride 10 mEq in 100 mL IVPB  10 mEq Intravenous Q2H Kyler Alberto MD        sacubitriL-valsartan  mg per tablet 1 tablet  1 tablet Oral BID Kyler Alberto MD   1 tablet at 24    sodium chloride 0.9% flush 10 mL  10 mL Intravenous PRN Kyler Alberto MD         Family History       Problem Relation (Age of Onset)    Hypertension Mother    No Known Problems Father, Sister, Brother, Maternal Grandmother, Maternal Grandfather, Paternal Grandmother, Paternal Grandfather, Maternal Aunt, Maternal Uncle, Paternal Aunt, Paternal Uncle          Tobacco Use    Smoking status: Former     Current packs/day: 0.00     Types: Cigarettes     Start date: 2006     Quit date: 2015     Years since quittin.9    Smokeless tobacco: Never    Tobacco comments:     marijuana   Substance and Sexual Activity    Alcohol use: No     Alcohol/week: 0.0 standard drinks of alcohol    Drug use: Yes     Frequency: 7.0 times per week     Types: Marijuana    Sexual activity: Yes     Partners: Female     Review of Systems   Constitutional: Negative.   HENT: Negative.     Cardiovascular:  Positive for chest pain. Negative for claudication, cyanosis, dyspnea on exertion, irregular heartbeat, leg swelling, near-syncope, orthopnea, palpitations, paroxysmal  nocturnal dyspnea and syncope.   Respiratory: Negative.     Endocrine: Negative.    Musculoskeletal: Negative.    Gastrointestinal: Negative.    Genitourinary: Negative.    Neurological: Negative.    All other systems reviewed and are negative.    Objective:     Vital Signs (Most Recent):  Temp: 98.2 °F (36.8 °C) (05/02/24 0301)  Pulse: 85 (05/02/24 0601)  Resp: 15 (05/02/24 0601)  BP: (!) 98/57 (05/02/24 0601)  SpO2: 96 % (05/02/24 0601) Vital Signs (24h Range):  Temp:  [98 °F (36.7 °C)-98.6 °F (37 °C)] 98.2 °F (36.8 °C)  Pulse:  [80-95] 85  Resp:  [15-30] 15  SpO2:  [3 %-97 %] 96 %  BP: ()/(50-93) 98/57     Weight: 98.5 kg (217 lb 2.5 oz)  Body mass index is 35.05 kg/m².  SpO2: 96 %    Intake/Output Summary (Last 24 hours) at 5/2/2024 0749  Last data filed at 5/2/2024 0601  Gross per 24 hour   Intake 1423.06 ml   Output 2025 ml   Net -601.94 ml     Physical Exam  Vitals and nursing note reviewed.   Constitutional:       General: He is not in acute distress.     Appearance: Normal appearance. He is normal weight. He is not ill-appearing or diaphoretic.   HENT:      Head: Normocephalic and atraumatic.   Eyes:      Pupils: Pupils are equal, round, and reactive to light.   Cardiovascular:      Rate and Rhythm: Normal rate and regular rhythm.      Pulses: Normal pulses.      Heart sounds: Normal heart sounds.   Pulmonary:      Effort: Pulmonary effort is normal.      Breath sounds: Normal breath sounds.   Abdominal:      General: Abdomen is flat. Bowel sounds are normal. There is no distension.      Palpations: Abdomen is soft. There is no mass.      Tenderness: There is no abdominal tenderness.   Musculoskeletal:         General: Normal range of motion.   Skin:     General: Skin is warm.      Capillary Refill: Capillary refill takes less than 2 seconds.   Neurological:      General: No focal deficit present.      Mental Status: He is alert and oriented to person, place, and time.     Significant Labs: CMP    Recent Labs   Lab 24  0333    140 136 136   K 4.0 3.1* 4.7 3.8    110 102 101   CO2 21* 20* 24 23   * 84 117* 114*   BUN 13 13 27* 27*   CREATININE 1.3 0.9 1.5* 1.6*   CALCIUM 10.0 7.8* 9.3 9.3   PROT 7.9  --   --  7.1   ALBUMIN 3.8  --   --  3.2*   BILITOT 1.5*  --   --  0.7   ALKPHOS 92  --   --  82   AST 28  --   --  31   ALT 20  --   --  17   ANIONGAP 11 10 10 12   , CBC   Recent Labs   Lab 24  0333   WBC 11.48 9.86 12.22   HGB 16.7 16.2 16.6   HCT 52.4 49.4 52.4    260 275   , and Troponin   Recent Labs   Lab 24  1827 24  0009 24  0515   TROPONINI 3.804* 4.684* 4.549*     Significant Imagin/1/24 Congenital echo   IMPRESSION:  Patient admitted for suspected MI with poor LV function on initial echocardiogram-  Technically difficult acoustic windows.  Qualitative impression of at least moderately dilated right atrium  No obvious signs of previously reported dilation of the tricuspid annulus.  There is trivial to mild tricuspid insufficiency.  Mild right ventricle hypertrophy with at least moderate dilation.  Qualitative impression of moderate to severely reduced systolic function of the inlet and apical segments of the right ventricle with large outflow tract patch.  Right ventricular pressure estimated 16 mmHg above right atrial pressure from reasonably well defined TR doppler profile.  Echodensity consistent with patch repair of ventricular septal defect and malalignment of the ventricular septum with no residual shunt demonstrated.  Images consistent with Anne implant in pulmonary homograft with peak velocity <1.7 m/sec and mean <7 mm Hg.  No significant pulmonary insufficiency.  2D imaging demonstrates well-developed confluence pulmonary branches.  The left atrium appears normal in size with walls not well defined for measurement.  Trivial mitral valve  insufficiency.  Qualitative impression of mild concentric left ventricular hypertrophy.  Flattened septal motion with decreased movement of the LV free wall, SF = 16% and EF qualitatively estimated 25 -30% from off axis apical views.  Trivial aortic valve insufficiency.  Aortic dimensions:  Sinuses of Valsalva = 38 mm.  ST junction              = 33 mm.  Ascending aorta      = 38 mm.  Normal size aortic arch with no evidence of coarctation.  No pericardial effusion.

## 2024-05-02 NOTE — SUBJECTIVE & OBJECTIVE
Interval History:    Pt resting in bed during interview with Mom and girlfriend (Mariia) at bedside, having just eaten lunch. He is in a good mood and understands the plan for RHC. All questions and concerns addressed. He did state that he hopes to move to the stepdown unit today. ACHYAN RN also visited pt to check up on him and remind him of upcoming appt scheduled on 5/16.     Pt given diuril on 5/1 with no increase in UOP so lasix gtt ordered and initiated. I/Os on 5/1 were -276.4. Pt's creatine has increased from 0.9 to 1.6.    Pt had a CV US of the renal arteries conducted today to r/o renal artery stenosis.     Interventional cardiology saw pt this AM and recommend an LHC +/- PCI once he is optimized from a renal and volume standpoint. Pt reports that the LHC is planned for either tomorrow or Monday.    Past Medical History:   Diagnosis Date    Aortic insufficiency     trace    Homograft cardiac valve stenosis     mild    Hypertension     Left ventricular dysfunction     Pulmonary artery hypertension     RV hypertension    Right ventricular dilation     S/P TOF (tetralogy of Fallot) repair 12/18/2014    TOF (tetralogy of Fallot)        Past Surgical History:   Procedure Laterality Date    balloon dilation of pulmonary atrery homograft  2008    CARDIAC VALVE SURGERY      7/23/15 @ [facility] [place]    epicardial pacing wires      LV    pulmonary valve replacement  July 7, 2006    TETRALOGY OF FALLOT REPAIR  1990       Review of patient's allergies indicates:  No Known Allergies    Current Facility-Administered Medications   Medication Dose Route Frequency Provider Last Rate Last Admin    amLODIPine tablet 10 mg  10 mg Oral Daily Kyler Alberto MD   10 mg at 05/02/24 0800    aspirin chewable tablet 81 mg  81 mg Oral Daily Kyler Alberto MD   81 mg at 05/02/24 0800    atorvastatin tablet 80 mg  80 mg Oral QHS Kyler Alberto MD   80 mg at 05/01/24 2008    carvediloL tablet 25 mg  25 mg  Oral BID Kyler Alberto MD   25 mg at 24 0800    furosemide (Lasix) 500 mg in 50 mL infusion (conc: 10 mg/mL)  10 mg/hr Intravenous Continuous Kyler Alberto MD 1 mL/hr at 24 1401 10 mg/hr at 24 1401    heparin 25,000 units in dextrose 5% (100 units/ml) IV bolus from bag LOW INTENSITY nomogram - [facility]  56.4 Units/kg (Adjusted) Intravenous PRN Kyler Alberto MD   3,990 Units at 24 1912    heparin 25,000 units in dextrose 5% (100 units/ml) IV bolus from bag LOW INTENSITY nomogram - [facility]  30 Units/kg (Adjusted) Intravenous PRN Kyler Alberto MD        heparin 25,000 units in dextrose 5% 250 mL (100 units/mL) infusion LOW INTENSITY nomogram - [facility]  0-40 Units/kg/hr (Adjusted) Intravenous Continuous Kyler Alberto MD 12.8 mL/hr at 24 1401 18 Units/kg/hr at 24 1401    hydrALAZINE tablet 50 mg  50 mg Oral Q12H Kyler Alberto MD   50 mg at 24 0800    melatonin tablet 9 mg  9 mg Oral Nightly PRN Kyler Alberto MD   9 mg at 24 0357    nitroGLYCERIN in 5 % dextrose 50 mg/250 mL (200 mcg/mL) infusion  0-400 mcg/min Intravenous Continuous Kyler Alberto MD   Stopped at 24 1429    sodium chloride 0.9% flush 10 mL  10 mL Intravenous PRN Kyler Alberto MD         Family History       Problem Relation (Age of Onset)    Hypertension Mother    No Known Problems Father, Sister, Brother, Maternal Grandmother, Maternal Grandfather, Paternal Grandmother, Paternal Grandfather, Maternal Aunt, Maternal Uncle, Paternal Aunt, Paternal Uncle          Tobacco Use    Smoking status: Former     Current packs/day: 0.00     Types: Cigarettes     Start date: 2006     Quit date: 2015     Years since quittin.9    Smokeless tobacco: Never    Tobacco comments:     marijuana   Substance and Sexual Activity    Alcohol use: No     Alcohol/week: 0.0 standard drinks  of alcohol    Drug use: Yes     Frequency: 7.0 times per week     Types: Marijuana    Sexual activity: Yes     Partners: Female     Objective:     Vital Signs (Most Recent):  Temp: 98 °F (36.7 °C) (05/02/24 1101)  Pulse: 88 (05/02/24 1301)  Resp: 20 (05/02/24 1301)  BP: (!) 96/51 (05/02/24 1301)  SpO2: 95 % (05/02/24 1301) Vital Signs (24h Range):  Temp:  [98 °F (36.7 °C)-98.6 °F (37 °C)] 98 °F (36.7 °C)  Pulse:  [80-95] 88  Resp:  [15-30] 20  SpO2:  [3 %-97 %] 95 %  BP: ()/(50-83) 96/51     Weight: 98.5 kg (217 lb 2.5 oz)  Body mass index is 35.05 kg/m².    SpO2: 95 %         Intake/Output Summary (Last 24 hours) at 5/2/2024 1447  Last data filed at 5/2/2024 1401  Gross per 24 hour   Intake 1428.93 ml   Output 2225 ml   Net -796.07 ml       Lines/Drains/Airways       Peripheral Intravenous Line  Duration                  Peripheral IV - Single Lumen 04/30/24 1903 20 G Right Antecubital 1 day         Peripheral IV - Single Lumen 04/30/24 2218 20 G Left Antecubital 1 day         Peripheral IV - Single Lumen 05/02/24 0642 20 G Left;Posterior Hand <1 day                     Physical Exam  Constitutional:       General: He is not in acute distress.     Appearance: He is obese. He is not ill-appearing.   HENT:      Head: Normocephalic and atraumatic.   Eyes:      Extraocular Movements: Extraocular movements intact.      Pupils: Pupils are equal, round, and reactive to light.   Cardiovascular:      Rate and Rhythm: Normal rate and regular rhythm.      Pulses: Normal pulses.      Comments: No murmur hear on auscultation. Fixed & split S2 sound  Pulmonary:      Effort: Pulmonary effort is normal.      Breath sounds: Normal breath sounds.   Abdominal:      General: There is no distension.      Palpations: Abdomen is soft.      Tenderness: There is no abdominal tenderness.   Musculoskeletal:      Cervical back: Normal range of motion and neck supple.   Skin:     General: Skin is warm.      Capillary Refill: Capillary  refill takes less than 2 seconds.   Neurological:      General: No focal deficit present.      Mental Status: He is oriented to person, place, and time.   Psychiatric:         Mood and Affect: Mood normal.         Behavior: Behavior normal.          Significant Labs:   Recent Labs   Lab 05/01/24 0326 05/01/24 2012 05/02/24 0333   GLU 84 117* 114*    136 136   K 3.1* 4.7 3.8    102 101   CO2 20* 24 23   BUN 13 27* 27*   CREATININE 0.9 1.5* 1.6*   CALCIUM 7.8* 9.3 9.3   MG 1.4*  --  2.3   , CMP   Recent Labs   Lab 04/30/24 1921 05/01/24 0326 05/01/24 2012 05/02/24 0333    140 136 136   K 4.0 3.1* 4.7 3.8    110 102 101   CO2 21* 20* 24 23   * 84 117* 114*   BUN 13 13 27* 27*   CREATININE 1.3 0.9 1.5* 1.6*   CALCIUM 10.0 7.8* 9.3 9.3   PROT 7.9  --   --  7.1   ALBUMIN 3.8  --   --  3.2*   BILITOT 1.5*  --   --  0.7   ALKPHOS 92  --   --  82   AST 28  --   --  31   ALT 20  --   --  17   ANIONGAP 11 10 10 12   , CBC   Recent Labs   Lab 04/30/24 1921 05/01/24 0326 05/02/24 0333   WBC 11.48 9.86 12.22   HGB 16.7 16.2 16.6   HCT 52.4 49.4 52.4    260 275   , INR   Recent Labs   Lab 04/30/24 2106   INR 1.2   , Lipid Panel   Recent Labs   Lab 04/30/24 2106   CHOL 158   HDL 37*   LDLCALC 104.2   TRIG 84   CHOLHDL 23.4   ,   Pathology Results  (Last 10 years)      None        , Troponin   Recent Labs   Lab 05/01/24 1827 05/02/24 0009 05/02/24 0515   TROPONINI 3.804* 4.684* 4.549*   , and   Recent Lab Results  (Last 5 results in the past 24 hours)        05/02/24  0515   05/02/24  0333   05/02/24  0009   05/01/24  2228   05/01/24 2012        Albumin   3.2             ALP   82             ALT   17             Anion Gap   12       10       PTT 42.8  Comment: Refer to local heparin nomogram for intensity/dose specific   therapeutic   range.       47.8  Comment: Refer to local heparin nomogram for intensity/dose specific   therapeutic   range.             AST   31              Baso #   0.08             Basophil %   0.7             BILIRUBIN TOTAL   0.7  Comment: For infants and newborns, interpretation of results should be based  on gestational age, weight and in agreement with clinical  observations.    Premature Infant recommended reference ranges:  Up to 24 hours.............<8.0 mg/dL  Up to 48 hours............<12.0 mg/dL  3-5 days..................<15.0 mg/dL  6-29 days.................<15.0 mg/dL               BUN   27       27       Calcium   9.3       9.3       Chloride   101       102       CO2   23       24       Creatinine   1.6       1.5       Differential Method   Automated             eGFR   57.6       >60.0       Eos #   0.6             Eos %   5.2             Glucose   114       117       Gran # (ANC)   8.3             Gran %   67.8             Hematocrit   52.4             Hemoglobin   16.6             Immature Grans (Abs)   0.04  Comment: Mild elevation in immature granulocytes is non specific and   can be seen in a variety of conditions including stress response,   acute inflammation, trauma and pregnancy. Correlation with other   laboratory and clinical findings is essential.               Immature Granulocytes   0.3             Lactic Acid Level       1.9  Comment: Falsely low lactic acid results can be found in samples   containing >=13.0 mg/dL total bilirubin and/or >=3.5 mg/dL   direct bilirubin.           Lymph #   1.9             Lymph %   15.9             Magnesium    2.3             MCH   27.4             MCHC   31.7             MCV   87             Mono #   1.2             Mono %   10.1             MPV   11.0             nRBC   0             Phosphorus Level   5.7             Platelet Count   275             Potassium   3.8       4.7       PROTEIN TOTAL   7.1             RBC   6.05             RDW   13.6             Sodium   136       136       Troponin I 4.549  Comment: The reference interval for Troponin I represents the 99th percentile   cutoff   for our  facility and is consistent with 3rd generation assay   performance.       4.684  Comment: The reference interval for Troponin I represents the 99th percentile   cutoff   for our facility and is consistent with 3rd generation assay   performance.             WBC   12.22                                    Significant Imaging:     Echo (5/1/24 at 8:55 AM)      Left Ventricle: Ventricular mass is normal. Mildly increased wall thickness. Severe global hypokinesis present. Septal motion is abnormal. There is severely reduced systolic function with a visually estimated ejection fraction of 15 - 20%. Ejection fraction by visual approximation is 18%. The biplane LVEF was much higher but not accurate due to technical issues. There is diastolic dysfunction but grade cannot be determined.    Right Ventricle: Systolic function is moderately reduced.    Right Atrium: Right atrium is moderately dilated.    IVC/SVC: Intermediate venous pressure at 8 mmHg.    Echo (5/1/24 at 1:38 PM)    IMPRESSION:  Patient admitted for suspected MI with poor LV function on initial echocardiogram-  Technically difficult acoustic windows.  Qualitative impression of at least moderately dilated right atrium  No obvious signs of previously reported dilation of the tricuspid annulus.  There is trivial to mild tricuspid insufficiency.  Mild right ventricle hypertrophy with at least moderate dilation.  Qualitative impression of moderate to severely reduced systolic function of the inlet and apical segments of the right ventricle with large outflow tract patch.  Right ventricular pressure estimated 16 mmHg above right atrial pressure from reasonably well defined TR doppler profile.  Echodensity consistent with patch repair of ventricular septal defect and malalignment of the ventricular septum with no residual shunt demonstrated.  Images consistent with Anne implant in pulmonary homograft with peak velocity <1.7 m/sec and mean <7 mm Hg.  No significant  pulmonary insufficiency.  2D imaging demonstrates well-developed confluence pulmonary branches.  The left atrium appears normal in size with walls not well defined for measurement.  Trivial mitral valve insufficiency.  Qualitative impression of mild concentric left ventricular hypertrophy.  Flattened septal motion with decreased movement of the LV free wall, SF = 16% and EF qualitatively estimated 25 -30% from off axis apical views.  Trivial aortic valve insufficiency.  Aortic dimensions:  Sinuses of Valsalva = 38 mm.  ST junction              = 33 mm.  Ascending aorta      = 38 mm.  Normal size aortic arch with no evidence of coarctation.  No pericardial effusion.

## 2024-05-02 NOTE — ASSESSMENT & PLAN NOTE
33yoM with a hx of repaired Tetralogy of Fallot (right arch, right coronary from left sinus), replacement of pulmonary valve with a 23-mm pulmonary homograft (07/07/2006) - epicardial LV pacing wires in place, severe homograft obstruction s/p RVOT stents and Anne valve 7/23/15 and working well on 03/2023, mild pulmonary artery hypertension, trace aortic insufficiency and mild aortic root enlargement, long history of decreased LV function - possibly exacerbated by high afterload (40-45% on 2023), and HTN, who was transferred to [facility] on 04/30/2024 with 2-3 days of intermittent chest tightness that was resolved with NTG and elevated troponin to 4.6. He is on a lasix gtt currently and has not had chest pain since transfer from the OSH.     Creatinine this morning increased from 0.9 to 1.6.    Recommendations:  - LHC when optimized from a renal and volume standpoint    When optimized, LHC +/- PCI. Patient is a CHUCHO candidate  - Anti-thrombotic Therapy: ASA, heparin  - Access: R radial  - Will get Bendaryl 50mg pO prior to procedure  - All patient's questions were answered.  -The risks, benefits and alternatives of the procedure were explained to the patient.   -The risks of coronary angiography include but are not limited to: bleeding, infection, heart rhythm abnormalities, allergic reactions, kidney injury and potential need for dialysis, stroke and death.   - Should stenting be indicated, the patient has agreed to dual anti-platelet therapy with a drug-eluting stent   - Additionally, pt is aware that non-compliance is likely to result in stent clotting with heart attack, heart failure, and/or death  -The risks of moderate sedation include hypotension, respiratory depression, arrhythmias, bronchospasm, and death.   - Informed consent was obtained and the  patient is agreeable to proceed with the procedure.

## 2024-05-02 NOTE — PROGRESS NOTES
[facility] - Cardiac Intensive Care  Cardiology  Progress Note    Patient Name: Thomas Horvath  MRN: [id number]  Admission Date: 4/30/2024  Hospital Length of Stay: 2 days  Code Status: Full Code   Attending Physician: Willi Overton MD   Primary Care Physician: Sissy Lacy MD  Expected Discharge Date: 5/3/2024  Principal Problem:NSTEMI (non-ST elevated myocardial infarction)    Subjective:     Hospital Course:   No notes on file    Interval History:    Pt resting in bed during interview with Mom and girlfriend (Mariia) at bedside, having just eaten lunch. He is in a good mood and understands the plan for RHC. All questions and concerns addressed. He did state that he hopes to move to the stepdown unit today. [facility] RN also visited pt to check up on him and remind him of upcoming appt scheduled on 5/16.     Pt given diuril on 5/1 with no increase in UOP so lasix gtt ordered and initiated. I/Os on 5/1 were -276.4. Pt's creatine has increased from 0.9 to 1.6.    Pt had a CV US of the renal arteries conducted today to r/o renal artery stenosis.     Interventional cardiology saw pt this AM and recommend an LHC +/- PCI once he is optimized from a renal and volume standpoint. Pt reports that the LHC is planned for either tomorrow or Monday.    Past Medical History:   Diagnosis Date    Aortic insufficiency     trace    Homograft cardiac valve stenosis     mild    Hypertension     Left ventricular dysfunction     Pulmonary artery hypertension     RV hypertension    Right ventricular dilation     S/P TOF (tetralogy of Fallot) repair 12/18/2014    TOF (tetralogy of Fallot)        Past Surgical History:   Procedure Laterality Date    balloon dilation of pulmonary atrery homograft  2008    CARDIAC VALVE SURGERY      7/23/15 @ [facility] [place]    epicardial pacing wires      LV    pulmonary valve replacement  July 7, 2006    TETRALOGY OF FALLOT REPAIR  1990       Review of patient's allergies indicates:  No Known Allergies    Current  Facility-Administered Medications   Medication Dose Route Frequency Provider Last Rate Last Admin    amLODIPine tablet 10 mg  10 mg Oral Daily Kyler Alberto MD   10 mg at 05/02/24 0800    aspirin chewable tablet 81 mg  81 mg Oral Daily Kyler Alberto MD   81 mg at 05/02/24 0800    atorvastatin tablet 80 mg  80 mg Oral QHS Kyler Alberto MD   80 mg at 05/01/24 2008    carvediloL tablet 25 mg  25 mg Oral BID Kyler Alberto MD   25 mg at 05/02/24 0800    furosemide (Lasix) 500 mg in 50 mL infusion (conc: 10 mg/mL)  10 mg/hr Intravenous Continuous Kyler Alberto MD 1 mL/hr at 05/02/24 1401 10 mg/hr at 05/02/24 1401    heparin 25,000 units in dextrose 5% (100 units/ml) IV bolus from bag LOW INTENSITY nomogram - [facility]  56.4 Units/kg (Adjusted) Intravenous PRN Kyler Alberto MD   3,990 Units at 05/01/24 1912    heparin 25,000 units in dextrose 5% (100 units/ml) IV bolus from bag LOW INTENSITY nomogram - [facility]  30 Units/kg (Adjusted) Intravenous PRN Kyler Alberto MD        heparin 25,000 units in dextrose 5% 250 mL (100 units/mL) infusion LOW INTENSITY nomogram - [facility]  0-40 Units/kg/hr (Adjusted) Intravenous Continuous Kyler Alberto MD 12.8 mL/hr at 05/02/24 1401 18 Units/kg/hr at 05/02/24 1401    hydrALAZINE tablet 50 mg  50 mg Oral Q12H Kyler Alberto MD   50 mg at 05/02/24 0800    melatonin tablet 9 mg  9 mg Oral Nightly PRN Kyler Alberto MD   9 mg at 05/01/24 0357    nitroGLYCERIN in 5 % dextrose 50 mg/250 mL (200 mcg/mL) infusion  0-400 mcg/min Intravenous Continuous Kyler Alberto MD   Stopped at 05/01/24 1429    sodium chloride 0.9% flush 10 mL  10 mL Intravenous PRN PorudominsKyler Simms MD         Family History       Problem Relation (Age of Onset)    Hypertension Mother    No Known Problems Father, Sister, Brother, Maternal Grandmother,  Maternal Grandfather, Paternal Grandmother, Paternal Grandfather, Maternal Aunt, Maternal Uncle, Paternal Aunt, Paternal Uncle          Tobacco Use    Smoking status: Former     Current packs/day: 0.00     Types: Cigarettes     Start date: 2006     Quit date: 2015     Years since quittin.9    Smokeless tobacco: Never    Tobacco comments:     marijuana   Substance and Sexual Activity    Alcohol use: No     Alcohol/week: 0.0 standard drinks of alcohol    Drug use: Yes     Frequency: 7.0 times per week     Types: Marijuana    Sexual activity: Yes     Partners: Female     Objective:     Vital Signs (Most Recent):  Temp: 98 °F (36.7 °C) (24 1101)  Pulse: 88 (24 1301)  Resp: 20 (24 1301)  BP: (!) 96/51 (24 1301)  SpO2: 95 % (24 1301) Vital Signs (24h Range):  Temp:  [98 °F (36.7 °C)-98.6 °F (37 °C)] 98 °F (36.7 °C)  Pulse:  [80-95] 88  Resp:  [15-30] 20  SpO2:  [3 %-97 %] 95 %  BP: ()/(50-83) 96/51     Weight: 98.5 kg (217 lb 2.5 oz)  Body mass index is 35.05 kg/m².    SpO2: 95 %         Intake/Output Summary (Last 24 hours) at 2024 1447  Last data filed at 2024 1401  Gross per 24 hour   Intake 1428.93 ml   Output 2225 ml   Net -796.07 ml       Lines/Drains/Airways       Peripheral Intravenous Line  Duration                  Peripheral IV - Single Lumen 24 1903 20 G Right Antecubital 1 day         Peripheral IV - Single Lumen 24 2218 20 G Left Antecubital 1 day         Peripheral IV - Single Lumen 24 0642 20 G Left;Posterior Hand <1 day                     Physical Exam  Constitutional:       General: He is not in acute distress.     Appearance: He is obese. He is not ill-appearing.   HENT:      Head: Normocephalic and atraumatic.   Eyes:      Extraocular Movements: Extraocular movements intact.      Pupils: Pupils are equal, round, and reactive to light.   Cardiovascular:      Rate and Rhythm: Normal rate and regular rhythm.      Pulses: Normal  pulses.      Comments: No murmur hear on auscultation. Fixed & split S2 sound  Pulmonary:      Effort: Pulmonary effort is normal.      Breath sounds: Normal breath sounds.   Abdominal:      General: There is no distension.      Palpations: Abdomen is soft.      Tenderness: There is no abdominal tenderness.   Musculoskeletal:      Cervical back: Normal range of motion and neck supple.   Skin:     General: Skin is warm.      Capillary Refill: Capillary refill takes less than 2 seconds.   Neurological:      General: No focal deficit present.      Mental Status: He is oriented to person, place, and time.   Psychiatric:         Mood and Affect: Mood normal.         Behavior: Behavior normal.          Significant Labs:   Recent Labs   Lab 05/01/24 0326 05/01/24 2012 05/02/24 0333   GLU 84 117* 114*    136 136   K 3.1* 4.7 3.8    102 101   CO2 20* 24 23   BUN 13 27* 27*   CREATININE 0.9 1.5* 1.6*   CALCIUM 7.8* 9.3 9.3   MG 1.4*  --  2.3   , CMP   Recent Labs   Lab 04/30/24 1921 05/01/24 0326 05/01/24 2012 05/02/24 0333    140 136 136   K 4.0 3.1* 4.7 3.8    110 102 101   CO2 21* 20* 24 23   * 84 117* 114*   BUN 13 13 27* 27*   CREATININE 1.3 0.9 1.5* 1.6*   CALCIUM 10.0 7.8* 9.3 9.3   PROT 7.9  --   --  7.1   ALBUMIN 3.8  --   --  3.2*   BILITOT 1.5*  --   --  0.7   ALKPHOS 92  --   --  82   AST 28  --   --  31   ALT 20  --   --  17   ANIONGAP 11 10 10 12   , CBC   Recent Labs   Lab 04/30/24 1921 05/01/24 0326 05/02/24  0333   WBC 11.48 9.86 12.22   HGB 16.7 16.2 16.6   HCT 52.4 49.4 52.4    260 275   , INR   Recent Labs   Lab 04/30/24 2106   INR 1.2   , Lipid Panel   Recent Labs   Lab 04/30/24 2106   CHOL 158   HDL 37*   LDLCALC 104.2   TRIG 84   CHOLHDL 23.4   ,   Pathology Results  (Last 10 years)      None        , Troponin   Recent Labs   Lab 05/01/24  1827 05/02/24  0009 05/02/24  0515   TROPONINI 3.804* 4.684* 4.549*   , and   Recent Lab Results  (Last 5 results in  the past 24 hours)        05/02/24  0515   05/02/24  0333   05/02/24  0009   05/01/24  2228   05/01/24 2012        Albumin   3.2             ALP   82             ALT   17             Anion Gap   12       10       PTT 42.8  Comment: Refer to local heparin nomogram for intensity/dose specific   therapeutic   range.       47.8  Comment: Refer to local heparin nomogram for intensity/dose specific   therapeutic   range.             AST   31             Baso #   0.08             Basophil %   0.7             BILIRUBIN TOTAL   0.7  Comment: For infants and newborns, interpretation of results should be based  on gestational age, weight and in agreement with clinical  observations.    Premature Infant recommended reference ranges:  Up to 24 hours.............<8.0 mg/dL  Up to 48 hours............<12.0 mg/dL  3-5 days..................<15.0 mg/dL  6-29 days.................<15.0 mg/dL               BUN   27       27       Calcium   9.3       9.3       Chloride   101       102       CO2   23       24       Creatinine   1.6       1.5       Differential Method   Automated             eGFR   57.6       >60.0       Eos #   0.6             Eos %   5.2             Glucose   114       117       Gran # (ANC)   8.3             Gran %   67.8             Hematocrit   52.4             Hemoglobin   16.6             Immature Grans (Abs)   0.04  Comment: Mild elevation in immature granulocytes is non specific and   can be seen in a variety of conditions including stress response,   acute inflammation, trauma and pregnancy. Correlation with other   laboratory and clinical findings is essential.               Immature Granulocytes   0.3             Lactic Acid Level       1.9  Comment: Falsely low lactic acid results can be found in samples   containing >=13.0 mg/dL total bilirubin and/or >=3.5 mg/dL   direct bilirubin.           Lymph #   1.9             Lymph %   15.9             Magnesium    2.3             MCH   27.4             MCHC    31.7             MCV   87             Mono #   1.2             Mono %   10.1             MPV   11.0             nRBC   0             Phosphorus Level   5.7             Platelet Count   275             Potassium   3.8       4.7       PROTEIN TOTAL   7.1             RBC   6.05             RDW   13.6             Sodium   136       136       Troponin I 4.549  Comment: The reference interval for Troponin I represents the 99th percentile   cutoff   for our facility and is consistent with 3rd generation assay   performance.       4.684  Comment: The reference interval for Troponin I represents the 99th percentile   cutoff   for our facility and is consistent with 3rd generation assay   performance.             WBC   12.22                                    Significant Imaging:     Echo (5/1/24 at 8:55 AM)      Left Ventricle: Ventricular mass is normal. Mildly increased wall thickness. Severe global hypokinesis present. Septal motion is abnormal. There is severely reduced systolic function with a visually estimated ejection fraction of 15 - 20%. Ejection fraction by visual approximation is 18%. The biplane LVEF was much higher but not accurate due to technical issues. There is diastolic dysfunction but grade cannot be determined.    Right Ventricle: Systolic function is moderately reduced.    Right Atrium: Right atrium is moderately dilated.    IVC/SVC: Intermediate venous pressure at 8 mmHg.    Echo (5/1/24 at 1:38 PM)    IMPRESSION:  Patient admitted for suspected MI with poor LV function on initial echocardiogram-  Technically difficult acoustic windows.  Qualitative impression of at least moderately dilated right atrium  No obvious signs of previously reported dilation of the tricuspid annulus.  There is trivial to mild tricuspid insufficiency.  Mild right ventricle hypertrophy with at least moderate dilation.  Qualitative impression of moderate to severely reduced systolic function of the inlet and apical segments of  "the right ventricle with large outflow tract patch.  Right ventricular pressure estimated 16 mmHg above right atrial pressure from reasonably well defined TR doppler profile.  Echodensity consistent with patch repair of ventricular septal defect and malalignment of the ventricular septum with no residual shunt demonstrated.  Images consistent with Anne implant in pulmonary homograft with peak velocity <1.7 m/sec and mean <7 mm Hg.  No significant pulmonary insufficiency.  2D imaging demonstrates well-developed confluence pulmonary branches.  The left atrium appears normal in size with walls not well defined for measurement.  Trivial mitral valve insufficiency.  Qualitative impression of mild concentric left ventricular hypertrophy.  Flattened septal motion with decreased movement of the LV free wall, SF = 16% and EF qualitatively estimated 25 -30% from off axis apical views.  Trivial aortic valve insufficiency.  Aortic dimensions:  Sinuses of Valsalva = 38 mm.  ST junction              = 33 mm.  Ascending aorta      = 38 mm.  Normal size aortic arch with no evidence of coarctation.  No pericardial effusion.    Assessment and Plan:       * NSTEMI (non-ST elevated myocardial infarction)  NSTEMI - ISAI 3 (13%), WING 64 (1.2%)  - Risk factors and established atherosclerotic disease: Young patient with congenital heart disease, no major atherosclerotic risk factors  - Diagnosis - Atypical chest pain (burning and tightness on right side of the chest), EKG shows left sided ST-T wave changes possible for ischemia, and myocardial injury is evidenced by trop of 2.121 and 2.104 (at OSH 1.85)  - Nitro sublingual with mild improvement of symptoms  - Due to nonspecific symptoms, and burning in nature, with some complain of "acid" in the back of his throat, will attempt GI cocktail    - Admit to CICU  - Follow up studies - serial troponin  - Ordered TTE  - Telemetry to monitor for ventricular arrhythmias  - ACS protocol: " Continue ASA 81mg qd, heparin gtt, atorvastatin 80mg qhs  - GDMT: Carvedilol  - Anti-ischemic medications: Nitrates sublingual and morphine PRN  - If patient fails medical therapy will consider invasive approach     Adult congenital heart disease  Summary of Diagnosis:    1. 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   - epicardial left ventricular pacing wires in place.   3. 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 Mild pulmonary artery hypertension noted in the past.   5. Trace aortic insufficiency and mild aortic root enlargement  6. Long history of decreased LV function - possibly exacerbated by high afterload.   - mildly improved with ejection fraction around 40-45% on echocardiogram 2023  7. Hypertension - poor compliance in the past with medications, still an issue  Strong family history of hypertension.    Discussion:    last seen by Dr. Corona July 2023.  An echocardiogram in March 2023 looked good although his ejection fraction was around 40-45%, actually improved compared to previous studies.  Blood pressure was again significantly elevated, and it was clear that his compliance with medications was suboptimal.  The plan was to see him back in 6 months, and stressed the importance of medication compliance  Called by an ER in [place].  Patient presented Tuesday with chest pain that started Monday.  Troponin was elevated at 1.85 with their upper limit of normal 0.12.  BNP was significantly elevated 827.  Our most recent BNP a year ago was normal although he has had mild elevation in the past.  Patient was given aspirin and sublingual nitroglycerin, and chest pain improved.  EKG, by report, unchanged with right bundle branch block, left axis deviation.  Discussed with Dr. Beatris Thompson ([facility] service)  and with Dr. Mckeon in Interventional Cardiology.  Recommendation was for transfer to our emergency room where the patient can be evaluated by Interventional Cardiology to see if we think there is a need for emergent catheterization given chest pain and elevated troponin.  If not, can be admitted to the HTS service for further evaluation of heart failure.  No history of fever, and by report CBC does not suggest acute infection, but he is definitely at risk for endocarditis given his Anne valve in the pulmonary position.  Echo on Wednesday with markedly worsened EF compared to last echo.  Repeat echo Thursday afternoon to better assess the PV, which looked great but EF still severely reduced.   Agree with plans to assess coronaries given chest pain (improved with NTG), elevated troponin, decreased LV function.          HFrEF (heart failure with reduced ejection fraction)  #HFrEF 40%, NYHA I, ACC/AHA class B/C:  - Etiology: Likely structural Heart Disease   - Hemodynamic profile: Perfused and appears euvolemic but elevated BNP (on Entresto) and CXR with prominence of the pulmonary vascular with bilateral pattern of pulmonary infiltrates  - Difficult compliance as per outpatient cardiology notes, probably taking meds only once a day  - Home GDMT: Carvedilol 25mg bid, Entresto 97-103mg bid, spironolactone held when entresto was initiated    - Ordered NT-ProBNP  - Repeat TTE  - Volume status optimization  - Furosemide 80mg once given, evaluate need of additional dosing  - Daily monitoring: Standing weights, Strict I/O, Na restriction (<2g/day), Keep K>4 and Mg>2  - GDMT: Continue Valsartan/Sacubitril (97/103mg bid), Carvedilol 25mg bid  - Not requiring vasodilator or inotropic support for now    Essential hypertension  - Poor compliance in the past with medications as per cardiologist note  - Strong family history of hypertension.  - Outpatient on amlodipine 10mg, carvedilol 25mg bid, hydralazine 50mg bid and on  entresto 97/103mg for HF       - Continue with outpatient medications        VTE Risk Mitigation (From admission, onward)           Ordered     IP VTE HIGH RISK PATIENT  Once         04/30/24 2355     Place sequential compression device  Until discontinued         04/30/24 2355     heparin 25,000 units in dextrose 5% (100 units/ml) IV bolus from bag LOW INTENSITY nomogram - [facility]  As needed (PRN)        Question:  Heparin Infusion Adjustment (DO NOT MODIFY ANSWER)  Answer:  [email or web address]\Images\Pharmacy\HeparinInfusions\heparin LOW INTENSITY nomogram for [facility] [id number]    04/30/24 2050     heparin 25,000 units in dextrose 5% (100 units/ml) IV bolus from bag LOW INTENSITY nomogram - [facility]  As needed (PRN)        Question:  Heparin Infusion Adjustment (DO NOT MODIFY ANSWER)  Answer:  [email or web address]\Images\Pharmacy\HeparinInfusions\heparin LOW INTENSITY nomogram for [facility] [id number]    04/30/24 2050     heparin 25,000 units in dextrose 5% 250 mL (100 units/mL) infusion LOW INTENSITY nomogram - [facility]  Continuous        Question:  Begin at (units/kg/hr)  Answer:  12    04/30/24 2050                    Maura Harvey PA-C  Cardiology  [facility] - Cardiac Intensive Care

## 2024-05-02 NOTE — ASSESSMENT & PLAN NOTE
Summary of Diagnosis:    1. 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   - epicardial left ventricular pacing wires in place.   3. 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 Mild pulmonary artery hypertension noted in the past.   5. Trace aortic insufficiency and mild aortic root enlargement  6. Long history of decreased LV function - possibly exacerbated by high afterload.   - mildly improved with ejection fraction around 40-45% on echocardiogram 2023  7. Hypertension - poor compliance in the past with medications, still an issue  Strong family history of hypertension.    Discussion:    last seen by Dr. Corona July 2023.  An echocardiogram in March 2023 looked good although his ejection fraction was around 40-45%, actually improved compared to previous studies.  Blood pressure was again significantly elevated, and it was clear that his compliance with medications was suboptimal.  The plan was to see him back in 6 months, and stressed the importance of medication compliance  Called by an ER in [place].  Patient presented Tuesday with chest pain that started Monday.  Troponin was elevated at 1.85 with their upper limit of normal 0.12.  BNP was significantly elevated 827.  Our most recent BNP a year ago was normal although he has had mild elevation in the past.  Patient was given aspirin and sublingual nitroglycerin, and chest pain improved.  EKG, by report, unchanged with right bundle branch block, left axis deviation.  Discussed with Dr. Beatris Thompson (HTS service) and with Dr. Mckeon in Interventional Cardiology.  Recommendation was for transfer to our emergency room where the patient can be evaluated by Interventional Cardiology to see if we think there is a need for emergent catheterization given chest  pain and elevated troponin.  If not, can be admitted to the [facility] service for further evaluation of heart failure.  No history of fever, and by report CBC does not suggest acute infection, but he is definitely at risk for endocarditis given his Anne valve in the pulmonary position.  Echo on Wednesday with markedly worsened EF compared to last echo.  Repeat echo Thursday afternoon to better assess the PV, which looked great but EF still severely reduced.   Agree with plans to assess coronaries given chest pain (improved with NTG), elevated troponin, decreased LV function.

## 2024-05-02 NOTE — PLAN OF CARE
Cardiac ICU Care Plan    POC reviewed with Thomas Horvath and family. Questions and concerns addressed. No acute events today. Pt progressing toward goals. Will continue to monitor. See below and flowsheets for full assessment and VS info.     250mg of diuril ordered and given with no increase in UOP.   Lasix gtt ordered and initiated.       Neuro:  [place] Coma Scale  Best Eye Response: 4-->(E4) spontaneous  Best Motor Response: 6-->(M6) obeys commands  Best Verbal Response: 5-->(V5) oriented  Jose Coma Scale Score: 15  Assessment Qualifiers: patient not sedated/intubated  Pupil PERRLA: yes    24 hr Temp:  [98 °F (36.7 °C)-98.6 °F (37 °C)]      CV:  Rhythm: normal sinus rhythm   DVT prophylaxis: VTE Required Core Measure: Pharmacological prophylaxis initiated/maintained                            Pulses  Right Radial Pulse: 2+ (normal)  Left Radial Pulse: 2+ (normal)  Right Dorsalis Pedis Pulse: 2+ (normal)  Left Dorsalis Pedis Pulse: 2+ (normal)  Right Posterior Tibial Pulse: 1+ (weak)  Left Posterior Tibial Pulse: 1+ (weak)    Resp:          GI/:  GI prophylaxis: no  Diet/Nutrition Received: NPO  Last Bowel Movement: 04/30/24  Voiding Characteristics: voids spontaneously without difficulty   Intake/Output Summary (Last 24 hours) at 5/2/2024 0111  Last data filed at 5/2/2024 0101  Gross per 24 hour   Intake 1861.82 ml   Output 2050 ml   Net -188.18 ml            Labs/Accuchecks:  Recent Labs   Lab 04/30/24 1921 05/01/24 0326   WBC 11.48 9.86   RBC 6.05 5.86   HGB 16.7 16.2   HCT 52.4 49.4    260      Recent Labs   Lab 04/30/24 2106 05/01/24 0326 05/01/24  1031 05/01/24  1827 05/02/24  0009   INR 1.2  --   --   --   --    APTT 30.2   < > 41.9* 30.8 47.8*    < > = values in this interval not displayed.      Recent Labs     04/30/24 1921 05/01/24 0326 05/01/24 2012      < > 136   K 4.0   < > 4.7   CO2 21*   < > 24      < > 102   BUN 13   < > 27*   CREATININE 1.3   < > 1.5*   ALKPHOS 92   "--   --    ALT 20  --   --    AST 28  --   --    BILITOT 1.5*  --   --     < > = values in this interval not displayed.       Recent Labs   Lab 05/01/24  1154 05/01/24  1827 05/02/24  0009   TROPONINI 3.371* 3.804* 4.684*    No results for input(s): "PH", "PCO2", "PO2", "HCO3", "POCSATURATED", "BE" in the last 72 hours.    Electrolytes: {electrolyte replacement:27684}  Accuchecks: none    Gtts/LDAs:  Current Facility-Administered Medications   Medication Dose Route Frequency Last Rate Last Admin    furosemide (Lasix) 500 mg in 50 mL infusion (conc: 10 mg/mL)  10 mg/hr Intravenous Continuous        heparin (porcine) in D5W  0-40 Units/kg/hr (Adjusted) Intravenous Continuous 12.8 mL/hr at 05/02/24 0101 18 Units/kg/hr at 05/02/24 0101    nitroGLYCERIN  0-400 mcg/min Intravenous Continuous   Stopped at 05/01/24 1429       Lines/Drains/Airways       Peripheral Intravenous Line  Duration                  Peripheral IV - Single Lumen 04/30/24 1903 20 G Right Antecubital 1 day         Peripheral IV - Single Lumen 04/30/24 2218 20 G Left Antecubital 1 day                    Skin/Wounds  Bathing/Skin Care: bath, complete (05/01/24 1105)  Wounds: No  Wound care consulted: No    Problem: Fall Injury Risk  Goal: Absence of Fall and Fall-Related Injury  Outcome: Progressing     "

## 2024-05-02 NOTE — CONSULTS
[facility] - Cardiac Intensive Care  Interventional Cardiology  Consult Note    Patient Name: Thomas Horvath  MRN: [id number]  Admission Date: 4/30/2024  Hospital Length of Stay: 2 days  Code Status: Full Code   Attending Provider: Willi Overton MD  Consulting Provider: Connor M Gillies, DO  Primary Care Physician: Sissy Lacy MD  Principal Problem:NSTEMI (non-ST elevated myocardial infarction)    Patient information was obtained from patient, past medical records, and ER records.     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      Per patient chest pain is substernal and radiates to the left chest, started 2-3 days prior to admission while he was lying down, waxing and weaning and lasting a few minutes at a time, described as tightness and burning, not improving or worsening by anything at home, and at the hospital mild improvement with nitro. He went to OSH were elevated troponin was seen and was diagnosed with NSTEMI, given ASA loading dose and transferred for interventional cardiology evaluation. He also complained of cough for the last week, but denies low extremity swelling, orthopnea, PND, palpitations, syncope, and only has SOB when he is having chest pain.    Interventional  cardiology consulted for [facility].    Past Medical History:   Diagnosis Date    Aortic insufficiency     trace    Homograft cardiac valve stenosis     mild    Hypertension     Left ventricular dysfunction     Pulmonary artery hypertension     RV hypertension    Right ventricular dilation     S/P TOF (tetralogy of Fallot) repair 12/18/2014    TOF (tetralogy of Fallot)      Past Surgical History:   Procedure Laterality Date    balloon dilation of pulmonary atrery homograft  2008    CARDIAC VALVE SURG[place]      7[place]23[place]15 [place] [facility] [place]    epicardial pacing wires      LV    pulmonary valve replacement  July 7, 2006    TETRALOGY OF FALLOT REPAIR  1990     Review of patient's allergies indicates:  No Known Allergies    Current Facility-Administered Medications   Medication Dose Route Frequency Provider Last Rate Last Admin    amLODIPine tablet 10 mg  10 mg Oral Daily Kyler Alberto MD   10 mg at 05/01/24 0846    aspirin chewable tablet 81 mg  81 mg Oral Daily Kyler Alberto MD   81 mg at 05/01/24 0846    atorvastatin tablet 80 mg  80 mg Oral QHS Kyler Alberto MD   80 mg at 05/01/24 2008    carvediloL tablet 25 mg  25 mg Oral BID Kyler Alberto MD   25 mg at 05/01/24 2008    furosemide (Lasix) 500 mg in 50 mL infusion (conc: 10 mg/mL)  10 mg/hr Intravenous Continuous Kyler Alberto MD 1 mL/hr at 05/02/24 0601 10 mg/hr at 05/02/24 0601    furosemide injection 80 mg  80 mg Intravenous Q12H Jonatan MD   80 mg at 05/01/24 2008    furosemide injection 80 mg  80 mg Intravenous Once Kyler Alberto MD        heparin 25,000 units in dextrose 5% (100 units/ml) IV bolus from bag LOW INTENSITY nomogram - [facility]  56.4 Units/kg (Adjusted) Intravenous PRN Kyler Alberto MD   3,990 Units at 05/01/24 1912    heparin 25,000 units in dextrose 5% (100 units/ml) IV bolus from bag LOW INTENSITY nomogram - [facility]  30 Units/kg (Adjusted) Intravenous PRN  Kyler Alberto MD        heparin 25,000 units in dextrose 5% 250 mL (100 units/mL) infusion LOW INTENSITY nomogram - [facility]  0-40 Units/kg/hr (Adjusted) Intravenous Continuous Kyler Alberto MD 12.8 mL/hr at 24 0601 18 Units/kg/hr at 24 0601    hydrALAZINE tablet 50 mg  50 mg Oral Q12H Kyler Alberto MD   50 mg at 24    melatonin tablet 9 mg  9 mg Oral Nightly PRN Kyler Alberto MD   9 mg at 24 0357    nitroGLYCERIN in 5 % dextrose 50 mg/250 mL (200 mcg/mL) infusion  0-400 mcg/min Intravenous Continuous Kyler Alberto MD   Stopped at 24 1429    potassium chloride 10 mEq in 100 mL IVPB  10 mEq Intravenous Q2H Kyler Alberto MD        sacubitriL-valsartan  mg per tablet 1 tablet  1 tablet Oral BID Kyler Alberto MD   1 tablet at 24    sodium chloride 0.9% flush 10 mL  10 mL Intravenous PRN Kyler Alberto MD         Family History       Problem Relation (Age of Onset)    Hypertension Mother    No Known Problems Father, Sister, Brother, Maternal Grandmother, Maternal Grandfather, Paternal Grandmother, Paternal Grandfather, Maternal Aunt, Maternal Uncle, Paternal Aunt, Paternal Uncle          Tobacco Use    Smoking status: Former     Current packs/day: 0.00     Types: Cigarettes     Start date: 2006     Quit date: 2015     Years since quittin.9    Smokeless tobacco: Never    Tobacco comments:     marijuana   Substance and Sexual Activity    Alcohol use: No     Alcohol/week: 0.0 standard drinks of alcohol    Drug use: Yes     Frequency: 7.0 times per week     Types: Marijuana    Sexual activity: Yes     Partners: Female     Review of Systems   Constitutional: Negative.   HENT: Negative.     Cardiovascular:  Positive for chest pain. Negative for claudication, cyanosis, dyspnea on exertion, irregular heartbeat, leg swelling, near-syncope,  orthopnea, palpitations, paroxysmal nocturnal dyspnea and syncope.   Respiratory: Negative.     Endocrine: Negative.    Musculoskeletal: Negative.    Gastrointestinal: Negative.    Genitourinary: Negative.    Neurological: Negative.    All other systems reviewed and are negative.    Objective:     Vital Signs (Most Recent):  Temp: 98.2 °F (36.8 °C) (05/02/24 0301)  Pulse: 85 (05/02/24 0601)  Resp: 15 (05/02/24 0601)  BP: (!) 98/57 (05/02/24 0601)  SpO2: 96 % (05/02/24 0601) Vital Signs (24h Range):  Temp:  [98 °F (36.7 °C)-98.6 °F (37 °C)] 98.2 °F (36.8 °C)  Pulse:  [80-95] 85  Resp:  [15-30] 15  SpO2:  [3 %-97 %] 96 %  BP: ()/(50-93) 98/57     Weight: 98.5 kg (217 lb 2.5 oz)  Body mass index is 35.05 kg/m².  SpO2: 96 %    Intake/Output Summary (Last 24 hours) at 5/2/2024 0749  Last data filed at 5/2/2024 0601  Gross per 24 hour   Intake 1423.06 ml   Output 2025 ml   Net -601.94 ml     Physical Exam  Vitals and nursing note reviewed.   Constitutional:       General: He is not in acute distress.     Appearance: Normal appearance. He is normal weight. He is not ill-appearing or diaphoretic.   HENT:      Head: Normocephalic and atraumatic.   Eyes:      Pupils: Pupils are equal, round, and reactive to light.   Cardiovascular:      Rate and Rhythm: Normal rate and regular rhythm.      Pulses: Normal pulses.      Heart sounds: Normal heart sounds.   Pulmonary:      Effort: Pulmonary effort is normal.      Breath sounds: Normal breath sounds.   Abdominal:      General: Abdomen is flat. Bowel sounds are normal. There is no distension.      Palpations: Abdomen is soft. There is no mass.      Tenderness: There is no abdominal tenderness.   Musculoskeletal:         General: Normal range of motion.   Skin:     General: Skin is warm.      Capillary Refill: Capillary refill takes less than 2 seconds.   Neurological:      General: No focal deficit present.      Mental Status: He is alert and oriented to person, place, and  time.     Significant Labs: CMP   Recent Labs   Lab 24  0333    140 136 136   K 4.0 3.1* 4.7 3.8    110 102 101   CO2 21* 20* 24 23   * 84 117* 114*   BUN 13 13 27* 27*   CREATININE 1.3 0.9 1.5* 1.6*   CALCIUM 10.0 7.8* 9.3 9.3   PROT 7.9  --   --  7.1   ALBUMIN 3.8  --   --  3.2*   BILITOT 1.5*  --   --  0.7   ALKPHOS 92  --   --  82   AST 28  --   --  31   ALT 20  --   --  17   ANIONGAP 11 10 10 12   , CBC   Recent Labs   Lab 24  0333   WBC 11.48 9.86 12.22   HGB 16.7 16.2 16.6   HCT 52.4 49.4 52.4    260 275   , and Troponin   Recent Labs   Lab 24  1827 24  0009 24  0515   TROPONINI 3.804* 4.684* 4.549*     Significant Imagin/1/24 Congenital echo   IMPRESSION:  Patient admitted for suspected MI with poor LV function on initial echocardiogram-  Technically difficult acoustic windows.  Qualitative impression of at least moderately dilated right atrium  No obvious signs of previously reported dilation of the tricuspid annulus.  There is trivial to mild tricuspid insufficiency.  Mild right ventricle hypertrophy with at least moderate dilation.  Qualitative impression of moderate to severely reduced systolic function of the inlet and apical segments of the right ventricle with large outflow tract patch.  Right ventricular pressure estimated 16 mmHg above right atrial pressure from reasonably well defined TR doppler profile.  Echodensity consistent with patch repair of ventricular septal defect and malalignment of the ventricular septum with no residual shunt demonstrated.  Images consistent with Anne implant in pulmonary homograft with peak velocity <1.7 m/sec and mean <7 mm Hg.  No significant pulmonary insufficiency.  2D imaging demonstrates well-developed confluence pulmonary branches.  The left atrium appears normal in size with walls not well defined for measurement.  Trivial  mitral valve insufficiency.  Qualitative impression of mild concentric left ventricular hypertrophy.  Flattened septal motion with decreased movement of the LV free wall, SF = 16% and EF qualitatively estimated 25 -30% from off axis apical views.  Trivial aortic valve insufficiency.  Aortic dimensions:  Sinuses of Valsalva = 38 mm.  ST junction              = 33 mm.  Ascending aorta      = 38 mm.  Normal size aortic arch with no evidence of coarctation.  No pericardial effusion.  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 He is on a lasix gtt currently and has not had chest pain since transfer from the OSH.     Creatinine this morning increased from 0.9 to 1.6.    Recommendations:  - LHC when optimized from a renal and volume standpoint    When optimized, LHC +/- PCI. Patient is a CHUCHO candidate  - Anti-thrombotic Therapy: ASA, heparin  - Access: R radial  - Will get Bendaryl 50mg pO prior to procedure  - All patient's questions were answered.  -The risks, benefits and alternatives of the procedure were explained to the patient.   -The risks of coronary angiography include but are not limited to: bleeding, infection, heart rhythm abnormalities, allergic reactions, kidney injury and potential need for dialysis, stroke and  death.   - Should stenting be indicated, the patient has agreed to dual anti-platelet therapy with a drug-eluting stent   - Additionally, pt is aware that non-compliance is likely to result in stent clotting with heart attack, heart failure, and/or death  -The risks of moderate sedation include hypotension, respiratory depression, arrhythmias, bronchospasm, and death.   - Informed consent was obtained and the  patient is agreeable to proceed with the procedure.        VTE Risk Mitigation (From admission, onward)           Ordered     IP VTE HIGH RISK PATIENT  Once         04/30/24 2355     Place sequential compression device  Until discontinued         04/30/24 2355     heparin 25,000 units in dextrose 5% (100 units/ml) IV bolus from bag LOW INTENSITY nomogram - [facility]  As needed (PRN)        Question:  Heparin Infusion Adjustment (DO NOT MODIFY ANSWER)  Answer:  \\[email or web address]\epic\Images\Pharmacy\HeparinInfusions\heparin LOW INTENSITY nomogram for [facility] [id number]    04/30/24 2050     heparin 25,000 units in dextrose 5% (100 units/ml) IV bolus from bag LOW INTENSITY nomogram - [facility]  As needed (PRN)        Question:  Heparin Infusion Adjustment (DO NOT MODIFY ANSWER)  Answer:  \\[email or web address]\epic\Images\Pharmacy\HeparinInfusions\heparin LOW INTENSITY nomogram for [facility] [id number]    04/30/24 2050     heparin 25,000 units in dextrose 5% 250 mL (100 units/mL) infusion LOW INTENSITY nomogram - [facility]  Continuous        Question:  Begin at (units/kg/hr)  Answer:  12    04/30/24 2050                    Thank you for your consult. I will follow-up with patient. Please contact us if you have any additional questions.    Connor M Gillies, DO  Interventional Cardiology   [facility] - Cardiac Intensive Care

## 2024-05-02 NOTE — HPI
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      Per patient chest pain is substernal and radiates to the left chest, started 2-3 days prior to admission while he was lying down, waxing and weaning and lasting a few minutes at a time, described as tightness and burning, not improving or worsening by anything at home, and at the hospital mild improvement with nitro. He went to OSH were elevated troponin was seen and was diagnosed with NSTEMI, given ASA loading dose and transferred for interventional cardiology evaluation. He also complained of cough for the last week, but denies low extremity swelling, orthopnea, PND, palpitations, syncope, and only has SOB when he is having chest pain.    Interventional cardiology consulted for [facility].

## 2024-05-03 LAB
ALBUMIN SERPL BCP-MCNC: 3.2 G/DL (ref 3.5–5.2)
ALP SERPL-CCNC: 85 U/L (ref 55–135)
ALT SERPL W/O P-5'-P-CCNC: 13 U/L (ref 10–44)
ANION GAP SERPL CALC-SCNC: 11 MMOL/L (ref 8–16)
ANION GAP SERPL CALC-SCNC: 13 MMOL/L (ref 8–16)
APTT PPP: 50.5 SEC (ref 21–32)
AST SERPL-CCNC: 22 U/L (ref 10–40)
BASOPHILS # BLD AUTO: 0.11 K/UL (ref 0–0.2)
BASOPHILS NFR BLD: 0.9 % (ref 0–1.9)
BILIRUB SERPL-MCNC: 0.5 MG/DL (ref 0.1–1)
BUN SERPL-MCNC: 32 MG/DL (ref 6–20)
BUN SERPL-MCNC: 32 MG/DL (ref 6–20)
CALCIUM SERPL-MCNC: 9.4 MG/DL (ref 8.7–10.5)
CALCIUM SERPL-MCNC: 9.8 MG/DL (ref 8.7–10.5)
CHLORIDE SERPL-SCNC: 100 MMOL/L (ref 95–110)
CHLORIDE SERPL-SCNC: 99 MMOL/L (ref 95–110)
CO2 SERPL-SCNC: 22 MMOL/L (ref 23–29)
CO2 SERPL-SCNC: 27 MMOL/L (ref 23–29)
CREAT SERPL-MCNC: 1.7 MG/DL (ref 0.5–1.4)
CREAT SERPL-MCNC: 1.8 MG/DL (ref 0.5–1.4)
DIFFERENTIAL METHOD BLD: ABNORMAL
EOSINOPHIL # BLD AUTO: 0.8 K/UL (ref 0–0.5)
EOSINOPHIL NFR BLD: 6.7 % (ref 0–8)
ERYTHROCYTE [DISTWIDTH] IN BLOOD BY AUTOMATED COUNT: 13.8 % (ref 11.5–14.5)
EST. GFR  (NO RACE VARIABLE): 50 ML/MIN/1.73 M^2
EST. GFR  (NO RACE VARIABLE): 53.6 ML/MIN/1.73 M^2
GLUCOSE SERPL-MCNC: 112 MG/DL (ref 70–110)
GLUCOSE SERPL-MCNC: 97 MG/DL (ref 70–110)
HCT VFR BLD AUTO: 51.1 % (ref 40–54)
HGB BLD-MCNC: 16.2 G/DL (ref 14–18)
IMM GRANULOCYTES # BLD AUTO: 0.04 K/UL (ref 0–0.04)
IMM GRANULOCYTES NFR BLD AUTO: 0.3 % (ref 0–0.5)
LYMPHOCYTES # BLD AUTO: 2.5 K/UL (ref 1–4.8)
LYMPHOCYTES NFR BLD: 20.6 % (ref 18–48)
MAGNESIUM SERPL-MCNC: 2.1 MG/DL (ref 1.6–2.6)
MCH RBC QN AUTO: 27.3 PG (ref 27–31)
MCHC RBC AUTO-ENTMCNC: 31.7 G/DL (ref 32–36)
MCV RBC AUTO: 86 FL (ref 82–98)
MONOCYTES # BLD AUTO: 1.3 K/UL (ref 0.3–1)
MONOCYTES NFR BLD: 11.2 % (ref 4–15)
NEUTROPHILS # BLD AUTO: 7.2 K/UL (ref 1.8–7.7)
NEUTROPHILS NFR BLD: 60.3 % (ref 38–73)
NRBC BLD-RTO: 0 /100 WBC
PHOSPHATE SERPL-MCNC: 5.8 MG/DL (ref 2.7–4.5)
PLATELET # BLD AUTO: 319 K/UL (ref 150–450)
PMV BLD AUTO: 10.7 FL (ref 9.2–12.9)
POTASSIUM SERPL-SCNC: 3.8 MMOL/L (ref 3.5–5.1)
POTASSIUM SERPL-SCNC: 4.2 MMOL/L (ref 3.5–5.1)
PROT SERPL-MCNC: 7.1 G/DL (ref 6–8.4)
RBC # BLD AUTO: 5.94 M/UL (ref 4.6–6.2)
SODIUM SERPL-SCNC: 135 MMOL/L (ref 136–145)
SODIUM SERPL-SCNC: 137 MMOL/L (ref 136–145)
TROPONIN I SERPL DL<=0.01 NG/ML-MCNC: 3.06 NG/ML (ref 0–0.03)
TROPONIN I SERPL DL<=0.01 NG/ML-MCNC: 3.21 NG/ML (ref 0–0.03)
WBC # BLD AUTO: 11.93 K/UL (ref 3.9–12.7)

## 2024-05-03 PROCEDURE — 99900035 HC TECH TIME PER 15 MIN (STAT)

## 2024-05-03 PROCEDURE — 85025 COMPLETE CBC W/AUTO DIFF WBC: CPT | Performed by: STUDENT IN AN ORGANIZED HEALTH CARE EDUCATION/TRAINING PROGRAM

## 2024-05-03 PROCEDURE — 25000003 PHARM REV CODE 250: Performed by: STUDENT IN AN ORGANIZED HEALTH CARE EDUCATION/TRAINING PROGRAM

## 2024-05-03 PROCEDURE — 99231 SBSQ HOSP IP/OBS SF/LOW 25: CPT | Mod: ,,, | Performed by: PEDIATRICS

## 2024-05-03 PROCEDURE — 99233 SBSQ HOSP IP/OBS HIGH 50: CPT | Mod: ,,, | Performed by: INTERNAL MEDICINE

## 2024-05-03 PROCEDURE — 83735 ASSAY OF MAGNESIUM: CPT | Performed by: STUDENT IN AN ORGANIZED HEALTH CARE EDUCATION/TRAINING PROGRAM

## 2024-05-03 PROCEDURE — 80053 COMPREHEN METABOLIC PANEL: CPT | Performed by: STUDENT IN AN ORGANIZED HEALTH CARE EDUCATION/TRAINING PROGRAM

## 2024-05-03 PROCEDURE — 84100 ASSAY OF PHOSPHORUS: CPT | Performed by: STUDENT IN AN ORGANIZED HEALTH CARE EDUCATION/TRAINING PROGRAM

## 2024-05-03 PROCEDURE — 20600001 HC STEP DOWN PRIVATE ROOM

## 2024-05-03 PROCEDURE — 36415 COLL VENOUS BLD VENIPUNCTURE: CPT | Performed by: STUDENT IN AN ORGANIZED HEALTH CARE EDUCATION/TRAINING PROGRAM

## 2024-05-03 PROCEDURE — 80048 BASIC METABOLIC PNL TOTAL CA: CPT | Mod: XB | Performed by: STUDENT IN AN ORGANIZED HEALTH CARE EDUCATION/TRAINING PROGRAM

## 2024-05-03 PROCEDURE — 63600175 PHARM REV CODE 636 W HCPCS: Performed by: STUDENT IN AN ORGANIZED HEALTH CARE EDUCATION/TRAINING PROGRAM

## 2024-05-03 PROCEDURE — 85730 THROMBOPLASTIN TIME PARTIAL: CPT | Performed by: STUDENT IN AN ORGANIZED HEALTH CARE EDUCATION/TRAINING PROGRAM

## 2024-05-03 PROCEDURE — 94761 N-INVAS EAR/PLS OXIMETRY MLT: CPT

## 2024-05-03 PROCEDURE — 84484 ASSAY OF TROPONIN QUANT: CPT | Performed by: STUDENT IN AN ORGANIZED HEALTH CARE EDUCATION/TRAINING PROGRAM

## 2024-05-03 RX ORDER — HYDRALAZINE HYDROCHLORIDE 25 MG/1
25 TABLET, FILM COATED ORAL EVERY 12 HOURS
Status: DISCONTINUED | OUTPATIENT
Start: 2024-05-03 | End: 2024-05-07 | Stop reason: HOSPADM

## 2024-05-03 RX ORDER — BUTALBITAL, ACETAMINOPHEN AND CAFFEINE 50; 325; 40 MG/1; MG/1; MG/1
1 TABLET ORAL ONCE
Status: COMPLETED | OUTPATIENT
Start: 2024-05-03 | End: 2024-05-03

## 2024-05-03 RX ORDER — POTASSIUM CHLORIDE 20 MEQ/1
20 TABLET, EXTENDED RELEASE ORAL ONCE
Status: COMPLETED | OUTPATIENT
Start: 2024-05-03 | End: 2024-05-03

## 2024-05-03 RX ADMIN — HYDRALAZINE HYDROCHLORIDE 25 MG: 25 TABLET, FILM COATED ORAL at 09:05

## 2024-05-03 RX ADMIN — HEPARIN SODIUM AND DEXTROSE 18 UNITS/KG/HR: 10000; 5 INJECTION INTRAVENOUS at 04:05

## 2024-05-03 RX ADMIN — POTASSIUM CHLORIDE 20 MEQ: 1500 TABLET, EXTENDED RELEASE ORAL at 06:05

## 2024-05-03 RX ADMIN — ATORVASTATIN CALCIUM 80 MG: 40 TABLET, FILM COATED ORAL at 08:05

## 2024-05-03 RX ADMIN — BUTALBITAL, ACETAMINOPHEN, AND CAFFEINE 1 TABLET: 50; 325; 40 TABLET ORAL at 09:05

## 2024-05-03 RX ADMIN — CARVEDILOL 3.12 MG: 3.12 TABLET, FILM COATED ORAL at 08:05

## 2024-05-03 RX ADMIN — HYDRALAZINE HYDROCHLORIDE 25 MG: 25 TABLET, FILM COATED ORAL at 08:05

## 2024-05-03 RX ADMIN — ASPIRIN 81 MG CHEWABLE TABLET 81 MG: 81 TABLET CHEWABLE at 08:05

## 2024-05-03 RX ADMIN — AMLODIPINE BESYLATE 10 MG: 10 TABLET ORAL at 08:05

## 2024-05-03 NOTE — CARE UPDATE
Dx: NSTEMI (non-ST elevated myocardial infarction)    Goals of Care: Step down. Cath lab 3/6    Vital Signs (last 12 hours):   Temp:  [98.4 °F (36.9 °C)-98.6 °F (37 °C)]   Pulse:  [82-95]   Resp:  [6-31]   BP: ()/(51-81)   SpO2:  [90 %-97 %]      Neuro: AAOx4 and Follows commands     Cardiac: SR/ST w/ LBBB    Respiratory: Room Air    Gtts: Lasix and Heparin    Urine Output: Voids Spontaneously      Drains:    Diet: Cardiac  and 1500mL Fluid Restriction     Labs/Accuchecks:     Skin:  intact.  Patient turned q2h, bony prominences protected, and mattress inflated/working correctly.   Adolfo Score: 22. If Adolfo Score is 16 or less, complete 4EYES note each shift.    Shift Events:  ANNA.  See flowsheet for further assessment/details.  Family updated on current condition/plan of care, questions answered, and emotional support provided.  MD updated on current condition, vitals, labs, and gtts.

## 2024-05-03 NOTE — PLAN OF CARE
Problem: Adult Inpatient Plan of Care  Goal: Plan of Care Review  Outcome: Progressing  Goal: Patient-Specific Goal (Individualized)  Outcome: Progressing  Goal: Absence of Hospital-Acquired Illness or Injury  Outcome: Progressing  Goal: Optimal Comfort and Wellbeing  Outcome: Progressing  Goal: Readiness for Transition of Care  Outcome: Progressing     Problem: Fall Injury Risk  Goal: Absence of Fall and Fall-Related Injury  Outcome: Progressing     Problem: Acute Kidney Injury/Impairment  Goal: Fluid and Electrolyte Balance  Outcome: Progressing  Goal: Improved Oral Intake  Outcome: Progressing  Goal: Effective Renal Function  Outcome: Progressing

## 2024-05-03 NOTE — PLAN OF CARE
Cardiac ICU Care Plan    POC reviewed with Thomas Horvath and family. Questions and concerns addressed. No acute events today. Pt progressing toward goals. Will continue to monitor. See below and flowsheets for full assessment and VS info.       Neuro:  [place] Coma Scale  Best Eye Response: 4-->(E4) spontaneous  Best Motor Response: 6-->(M6) obeys commands  Best Verbal Response: 5-->(V5) oriented  [place] Coma Scale Score: 15  Assessment Qualifiers: patient not sedated/intubated  Pupil PERRLA: yes    24 hr Temp:  [97.9 °F (36.6 °C)-99 °F (37.2 °C)]      CV:  Rhythm: normal sinus rhythm   DVT prophylaxis: VTE Required Core Measure: Pharmacological prophylaxis initiated/maintained                            Pulses  Right Radial Pulse: 2+ (normal)  Left Radial Pulse: 2+ (normal)  Right Dorsalis Pedis Pulse: 2+ (normal)  Left Dorsalis Pedis Pulse: 2+ (normal)  Right Posterior Tibial Pulse: 1+ (weak)  Left Posterior Tibial Pulse: 1+ (weak)    Resp:          GI/:  GI prophylaxis: no  Diet/Nutrition Received: low saturated fat/low cholesterol, 2 gram sodium  Last Bowel Movement: 04/30/24  Voiding Characteristics: voids spontaneously without difficulty   Intake/Output Summary (Last 24 hours) at 5/3/2024 0521  Last data filed at 5/3/2024 0301  Gross per 24 hour   Intake 1144.75 ml   Output 2225 ml   Net -1080.25 ml            Labs/Accuchecks:  Recent Labs   Lab 04/30/24  1921 05/01/24  0326 05/02/24  0333   WBC 11.48 9.86 12.22   RBC 6.05 5.86 6.05   HGB 16.7 16.2 16.6   HCT 52.4 49.4 52.4    260 275      Recent Labs   Lab 04/30/24  2106 05/01/24  0326 05/01/24  1827 05/02/24  0009 05/02/24  0515   INR 1.2  --   --   --   --    APTT 30.2   < > 30.8 47.8* 42.8*    < > = values in this interval not displayed.      Recent Labs     05/02/24  0333 05/02/24  1442    135*   K 3.8 3.9   CO2 23 20*    100   BUN 27* 32*   CREATININE 1.6* 2.0*   ALKPHOS 82  --    ALT 17  --    AST 31  --    BILITOT 0.7  --       "  Recent Labs   Lab 05/02/24  1359 05/02/24  1743 05/02/24  2307   TROPONINI 4.299* 3.052* 3.055*    No results for input(s): "PH", "PCO2", "PO2", "HCO3", "POCSATURATED", "BE" in the last 72 hours.    Electrolytes: Electrolytes replaced  Accuchecks: none    Gtts/LDAs:  Current Facility-Administered Medications   Medication Dose Route Frequency Last Rate Last Admin    furosemide (Lasix) 500 mg in 50 mL infusion (conc: 10 mg/mL)  10 mg/hr Intravenous Continuous 1 mL/hr at 05/03/24 0301 10 mg/hr at 05/03/24 0301    heparin (porcine) in D5W  0-40 Units/kg/hr (Adjusted) Intravenous Continuous 12.8 mL/hr at 05/03/24 0429 18 Units/kg/hr at 05/03/24 0429    nitroGLYCERIN  0-400 mcg/min Intravenous Continuous   Stopped at 05/01/24 1429       Lines/Drains/Airways       Peripheral Intravenous Line  Duration                  Peripheral IV - Single Lumen 04/30/24 1903 20 G Right Antecubital 2 days         Peripheral IV - Single Lumen 04/30/24 2218 20 G Left Antecubital 2 days         Peripheral IV - Single Lumen 05/02/24 0642 20 G Left;Posterior Hand <1 day                    Skin/Wounds  Bathing/Skin Care: linen changed (05/02/24 2301)  Wounds: No  Wound care consulted: No    Problem: Fall Injury Risk  Goal: Absence of Fall and Fall-Related Injury  Outcome: Progressing     Problem: Acute Kidney Injury/Impairment  Goal: Fluid and Electrolyte Balance  Outcome: Progressing     Problem: Acute Kidney Injury/Impairment  Goal: Improved Oral Intake  Outcome: Progressing     "

## 2024-05-03 NOTE — PROGRESS NOTES
[facility] - Cardiac Intensive Care  Cardiology  Progress Note    Patient Name: Thomas Horvath  MRN: [id number]  Admission Date: 4/30/2024  Hospital Length of Stay: 3 days  Code Status: Full Code   Attending Physician: Anand Keen MD   Primary Care Physician: Sissy Lacy MD  Expected Discharge Date: 5/3/2024  Principal Problem:NSTEMI (non-ST elevated myocardial infarction)    Subjective:     Hospital Course:   No notes on file    Interval History:    Pt continues to feel well and his chest pain has completely resolved. He does report feeling fatigued. He has been somewhat hypotensive, suggesting that he has been noncompliant with his intensive antihypertensive medical regimen at home.      Troponin levels peaked at 4.6. Creatinine increased to 1.8 this AM. CV US of the renal arteries yesterday shows insignificant stenosis in both arteries. Elevated renal resistive index suggestive of intrinsic kidney disease.[facility] pending improvement in renal function and will likely be on Monday.    Pt moving to the cardiology step-down unit today (currently waiting on room.)     Past Medical History:   Diagnosis Date    Aortic insufficiency     trace    Homograft cardiac valve stenosis     mild    Hypertension     Left ventricular dysfunction     Pulmonary artery hypertension     RV hypertension    Right ventricular dilation     S/P TOF (tetralogy of Fallot) repair 12/18/2014    TOF (tetralogy of Fallot)        Past Surgical History:   Procedure Laterality Date    balloon dilation of pulmonary atrery homograft  2008    CARDIAC VALVE [place] [facility] [place]icardial pacing wires      LV    pulmonary valve replacement  July 7, 2006    TETRALOGY OF FALLOT REPAIR  1990       Review of patient's allergies indicates:  No Known Allergies    Current Facility-Administered Medications   Medication Dose Route Frequency Provider Last Rate Last Admin    acetaminophen tablet 650 mg  650 mg Oral Q8H PRN Jodie San  MD Kyler   650 mg at 05/02/24 1958    amLODIPine tablet 10 mg  10 mg Oral Daily Kyler Alberto MD   10 mg at 05/03/24 0855    aspirin chewable tablet 81 mg  81 mg Oral Daily Kyler Alberto MD   81 mg at 05/03/24 0856    atorvastatin tablet 80 mg  80 mg Oral QHS Kyler Alberto MD   80 mg at 05/02/24 2000    carvediloL tablet 3.125 mg  3.125 mg Oral BID Jonatan Randall MD   3.125 mg at 05/03/24 0856    furosemide (Lasix) 500 mg in 50 mL infusion (conc: 10 mg/mL)  10 mg/hr Intravenous Continuous Kyler Alberto MD 1 mL/hr at 05/03/24 0701 10 mg/hr at 05/03/24 0701    heparin 25,000 units in dextrose 5% (100 units/ml) IV bolus from bag LOW INTENSITY nomogram - [facility]  56.4 Units/kg (Adjusted) Intravenous PRN Kyler Alberto MD   3,990 Units at 05/01/24 1912    heparin 25,000 units in dextrose 5% (100 units/ml) IV bolus from bag LOW INTENSITY nomogram - [facility]  30 Units/kg (Adjusted) Intravenous PRN Kyler Alberto MD        heparin 25,000 units in dextrose 5% 250 mL (100 units/mL) infusion LOW INTENSITY nomogram - [facility]  0-40 Units/kg/hr (Adjusted) Intravenous Continuous Kyler Alberto MD 12.8 mL/hr at 05/03/24 0701 18 Units/kg/hr at 05/03/24 0701    hydrALAZINE tablet 25 mg  25 mg Oral Q12H Jonatan Randall MD   25 mg at 05/03/24 0910    melatonin tablet 9 mg  9 mg Oral Nightly PRN Kyler Alberto MD   9 mg at 05/01/24 0357    nitroGLYCERIN in 5 % dextrose 50 mg/250 mL (200 mcg/mL) infusion  0-400 mcg/min Intravenous Continuous Kyler Alberto MD   Stopped at 05/01/24 1429    sodium chloride 0.9% flush 10 mL  10 mL Intravenous PRN Kyler Alberto MD         Family History       Problem Relation (Age of Onset)    Hypertension Mother    No Known Problems Father, Sister, Brother, Maternal Grandmother, Maternal Grandfather, Paternal Grandmother, Paternal Grandfather, Maternal Aunt, Maternal  Uncle, Paternal Aunt, Paternal Uncle          Tobacco Use    Smoking status: Former     Current packs/day: 0.00     Types: Cigarettes     Start date: 2006     Quit date: 2015     Years since quittin.9    Smokeless tobacco: Never    Tobacco comments:     marijuana   Substance and Sexual Activity    Alcohol use: No     Alcohol/week: 0.0 standard drinks of alcohol    Drug use: Yes     Frequency: 7.0 times per week     Types: Marijuana    Sexual activity: Yes     Partners: Female     Objective:     Vital Signs (Most Recent):  Temp: 98.4 °F (36.9 °C) (24 0301)  Pulse: 95 (24 0900)  Resp: (!) 22 (24 0900)  BP: 124/81 (24 0900)  SpO2: 96 % (24 09) Vital Signs (24h Range):  Temp:  [97.9 °F (36.6 °C)-99 °F (37.2 °C)] 98.4 °F (36.9 °C)  Pulse:  [82-95] 95  Resp:  [17-26] 22  SpO2:  [90 %-97 %] 96 %  BP: ()/(50-81) 124/81     Weight: 98.5 kg (217 lb 2.5 oz)  Body mass index is 35.05 kg/m².    SpO2: 96 %         Intake/Output Summary (Last 24 hours) at 5/3/2024 1031  Last data filed at 5/3/2024 0823  Gross per 24 hour   Intake 1066.46 ml   Output 2300 ml   Net -1233.54 ml       Lines/Drains/Airways       Peripheral Intravenous Line  Duration                  Peripheral IV - Single Lumen 24 1903 20 G Right Antecubital 2 days         Peripheral IV - Single Lumen 24 2218 20 G Left Antecubital 2 days         Peripheral IV - Single Lumen 24 0642 20 G Left;Posterior Hand 1 day                     Physical Exam  Constitutional:       General: He is not in acute distress.     Appearance: He is obese. He is not ill-appearing.      Comments: Positive for malaise/fatigue   HENT:      Head: Normocephalic and atraumatic.   Eyes:      Extraocular Movements: Extraocular movements intact.      Pupils: Pupils are equal, round, and reactive to light.   Cardiovascular:      Rate and Rhythm: Normal rate and regular rhythm.      Pulses: Normal pulses.      Comments: No murmur heard  "on auscultation. Fixed & split S2 sound  Pulmonary:      Effort: Pulmonary effort is normal.      Comments: Crackles bilaterally  Abdominal:      General: There is no distension.      Palpations: Abdomen is soft.      Tenderness: There is no abdominal tenderness.   Musculoskeletal:      Cervical back: Normal range of motion and neck supple.   Skin:     General: Skin is warm.      Capillary Refill: Capillary refill takes less than 2 seconds.   Neurological:      General: No focal deficit present.      Mental Status: He is oriented to person, place, and time.   Psychiatric:         Mood and Affect: Mood normal.         Behavior: Behavior normal.          Significant Labs:   Recent Labs   Lab 05/02/24  0333 05/02/24  1442 05/03/24  0508   * 128* 112*    135* 135*   K 3.8 3.9 3.8    100 100   CO2 23 20* 22*   BUN 27* 32* 32*   CREATININE 1.6* 2.0* 1.8*   CALCIUM 9.3 9.8 9.4   MG 2.3  --  2.1   , CMP   Recent Labs   Lab 05/02/24  0333 05/02/24  1442 05/03/24  0508    135* 135*   K 3.8 3.9 3.8    100 100   CO2 23 20* 22*   * 128* 112*   BUN 27* 32* 32*   CREATININE 1.6* 2.0* 1.8*   CALCIUM 9.3 9.8 9.4   PROT 7.1  --  7.1   ALBUMIN 3.2*  --  3.2*   BILITOT 0.7  --  0.5   ALKPHOS 82  --  85   AST 31  --  22   ALT 17  --  13   ANIONGAP 12 15 13   , CBC   Recent Labs   Lab 05/02/24  0333 05/03/24  0508   WBC 12.22 11.93   HGB 16.6 16.2   HCT 52.4 51.1    319   , INR   No results for input(s): "INR", "PROTIME" in the last 48 hours.  , Lipid Panel   No results for input(s): "CHOL", "HDL", "LDLCALC", "TRIG", "CHOLHDL" in the last 48 hours.  ,   Pathology Results  (Last 10 years)      None        , Troponin   Recent Labs   Lab 05/02/24  1743 05/02/24  2307 05/03/24  0508   TROPONINI 3.052* 3.055* 3.205*   , and   Recent Lab Results  (Last 5 results in the past 24 hours)        05/03/24  0508   05/02/24  2307   05/02/24  1743   05/02/24  1459   05/02/24  1442        Mid abd aorta EDV    "    11.00         Mid abd aorta PSV       83.00         Albumin 3.2               ALP 85               ALT 13               Anion Gap 13         15  Comment: CORRECTED RESULT; previously reported as 14 on 05/02/2024 at 15:56.  [C]       PTT 50.5  Comment: Refer to local heparin nomogram for intensity/dose specific   therapeutic   range.                 AST 22               Baso # 0.11               Basophil % 0.9               BILIRUBIN TOTAL 0.5  Comment: For infants and newborns, interpretation of results should be based  on gestational age, weight and in agreement with clinical  observations.    Premature Infant recommended reference ranges:  Up to 24 hours.............<8.0 mg/dL  Up to 48 hours............<12.0 mg/dL  3-5 days..................<15.0 mg/dL  6-29 days.................<15.0 mg/dL                 BUN 32         32       Calcium 9.4         9.8       Chloride 100         100       CO2 22         20       Creatinine 1.8         2.0       Differential Method Automated               eGFR 50.0         44.1       Eos # 0.8               Eos % 6.7               Glucose 112         128       Gran # (ANC) 7.2               Gran % 60.3               Hematocrit 51.1               Hemoglobin 16.2               Immature Grans (Abs) 0.04  Comment: Mild elevation in immature granulocytes is non specific and   can be seen in a variety of conditions including stress response,   acute inflammation, trauma and pregnancy. Correlation with other   laboratory and clinical findings is essential.                 Immature Granulocytes 0.3               Left renal dist castellanos       16.00         Left renal dist sys       48.00         Left Renal Ultrasound Acceleration Time Measurement 1       46.00         Left Renal Ultrasound Acceleration Time Measurement 2       40.00         Left Renal Ultrasound Acceleration Time Measurement 3       51.00         Left Renal Ultrasound Acceleration Time       51.00         Left Renal  Ultrasound Kidney Size Measurement 1       10.10         Left Renal Ultrasound Kidney Size       10.10         Left Renal Ultrasound Resistive Index Measurement 1       0.73         Left Renal Ultrasound Resistive Index Measurement 2       0.71         Left Renal Ultrasound Resistive Index Measurement 3       0.68         Left Renal Ultrasound Resistive Index Measurement       0.73         Lymph # 2.5               Lymph % 20.6               Magnesium  2.1               MCH 27.3               MCHC 31.7               MCV 86               Mono # 1.3               Mono % 11.2               MPV 10.7               nRBC 0               Left RAR       0.58         Right RAR       1.12         Left Renal Highest EDV       16.00         Left Renal Highest PSV       48.00         Right Renal Highest EDV       18.00         Right Renal Highest PSV       93.00         Phosphorus Level 5.8               Platelet Count 319               Potassium 3.8         3.9       PROTEIN TOTAL 7.1               RBC 5.94               Right renal dist castellanos       18.00         Right renal dist sys       93.00         RDW 13.8               Right Renal Ultrasound Acceleration Time Measurement 1       71.00         Right Renal Ultrasound Acceleration Time Measurement 2       66.00         Right Renal Ultrasound Acceleration Time Measurement 3       63.00         Right Renal Ultrasound Acceleration Time       71.00         Right Renal Ultrasound Kidney Size Measurement 1       10.00         Right Renal Ultrasound Kidney Size       10.00         Right Renal Ultrasound Resistive Index Measurement 1       0.78         Right Renal Ultrasound Resistive Index Measurement 2       0.75         Right Renal Ultrasound Resistive Index Measurement 3       0.65         Right Renal Ultrasound Resistive Index       0.78         Sodium 135         135       Troponin I 3.205  Comment: The reference interval for Troponin I represents the 99th percentile   cutoff    for our facility and is consistent with 3rd generation assay   performance.     3.055  Comment: The reference interval for Troponin I represents the 99th percentile   cutoff   for our facility and is consistent with 3rd generation assay   performance.     3.052  Comment: The reference interval for Troponin I represents the 99th percentile   cutoff   for our facility and is consistent with 3rd generation assay   performance.             WBC 11.93                                       [C] - Corrected Result               Significant Imaging:     CV US Renal Artery Stenosis Hypertension (5/2/24)      There is insignificant stenosis (0-59%) in the Right Renal Artery.    Elevated right renal resistive index suggestive of intrinsic kidney disease.    Right kidney 10.00 cm.    There is insignificant stenosis (0-59%) in the Left Renal Artery.    Elevated left renal resistive index suggestive of intrinsic kidney disease.    Left kidney 10.10 cm.    This was a technically difficult study. This study was limited due to excessive bowel gas.    Echo (5/1/24 at 8:55 AM)      Left Ventricle: Ventricular mass is normal. Mildly increased wall thickness. Severe global hypokinesis present. Septal motion is abnormal. There is severely reduced systolic function with a visually estimated ejection fraction of 15 - 20%. Ejection fraction by visual approximation is 18%. The biplane LVEF was much higher but not accurate due to technical issues. There is diastolic dysfunction but grade cannot be determined.    Right Ventricle: Systolic function is moderately reduced.    Right Atrium: Right atrium is moderately dilated.    IVC/SVC: Intermediate venous pressure at 8 mmHg.    Echo (5/1/24 at 1:38 PM)    IMPRESSION:  Patient admitted for suspected MI with poor LV function on initial echocardiogram-  Technically difficult acoustic windows.  Qualitative impression of at least moderately dilated right atrium  No obvious signs of previously  reported dilation of the tricuspid annulus.  There is trivial to mild tricuspid insufficiency.  Mild right ventricle hypertrophy with at least moderate dilation.  Qualitative impression of moderate to severely reduced systolic function of the inlet and apical segments of the right ventricle with large outflow tract patch.  Right ventricular pressure estimated 16 mmHg above right atrial pressure from reasonably well defined TR doppler profile.  Echodensity consistent with patch repair of ventricular septal defect and malalignment of the ventricular septum with no residual shunt demonstrated.  Images consistent with Anne implant in pulmonary homograft with peak velocity <1.7 m/sec and mean <7 mm Hg.  No significant pulmonary insufficiency.  2D imaging demonstrates well-developed confluence pulmonary branches.  The left atrium appears normal in size with walls not well defined for measurement.  Trivial mitral valve insufficiency.  Qualitative impression of mild concentric left ventricular hypertrophy.  Flattened septal motion with decreased movement of the LV free wall, SF = 16% and EF qualitatively estimated 25 -30% from off axis apical views.  Trivial aortic valve insufficiency.  Aortic dimensions:  Sinuses of Valsalva = 38 mm.  ST junction              = 33 mm.  Ascending aorta      = 38 mm.  Normal size aortic arch with no evidence of coarctation.  No pericardial effusion.    ROS  Assessment and Plan:       * NSTEMI (non-ST elevated myocardial infarction)  NSTEMI - ISAI 3 (13%), WING 64 (1.2%)  - Risk factors and established atherosclerotic disease: Young patient with congenital heart disease, no major atherosclerotic risk factors  - Diagnosis - Atypical chest pain (burning and tightness on right side of the chest), EKG shows left sided ST-T wave changes possible for ischemia, and myocardial injury is evidenced by trop of 2.121 and 2.104 (at OSH 1.85)  - Nitro sublingual with mild improvement of symptoms  - Due  "to nonspecific symptoms, and burning in nature, with some complain of "acid" in the back of his throat, will attempt GI cocktail    - Admit to CICU  - Follow up studies - serial troponin  - Ordered TTE  - Telemetry to monitor for ventricular arrhythmias  - ACS protocol: Continue ASA 81mg qd, heparin gtt, atorvastatin 80mg qhs  - GDMT: Carvedilol  - Anti-ischemic medications: Nitrates sublingual and morphine PRN  - If patient fails medical therapy will consider invasive approach     VINOD (acute kidney injury)  Dramatic drop in blood pressure and rise in creatine after restarting home BP meds medications  He had previously reported taking all medications; however, suspect non compliance given his response to restarting these meds including entresto  Holding entresto   Decreased coreg given ADHF    Adult congenital heart disease  Summary of Diagnosis:    1. 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   - epicardial left ventricular pacing wires in place.   3. 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 Mild pulmonary artery hypertension noted in the past.   5. Trace aortic insufficiency and mild aortic root enlargement  6. Long history of decreased LV function - possibly exacerbated by high afterload.   - mildly improved with ejection fraction around 40-45% on echocardiogram 2023  7. Hypertension - poor compliance in the past with medications, still an issue  Strong family history of hypertension.    Discussion:    last seen by Dr. Corona July 2023.  An echocardiogram in March 2023 looked good although his ejection fraction was around 40-45%, actually improved compared to previous studies.  Blood pressure was again significantly elevated, and it was clear that his compliance with medications was suboptimal.  The plan " was to see him back in 6 months, and stressed the importance of medication compliance  Called by an ER in [place].  Patient presented Tuesday with chest pain that started Monday.  Troponin was elevated at 1.85 with their upper limit of normal 0.12.  BNP was significantly elevated 827.  Our most recent BNP a year ago was normal although he has had mild elevation in the past.  Patient was given aspirin and sublingual nitroglycerin, and chest pain improved.  EKG, by report, unchanged with right bundle branch block, left axis deviation.  Discussed with Dr. Beatris Thompson (HTS service) and with Dr. Mckeon in Interventional Cardiology.  Recommendation was for transfer to our emergency room where the patient can be evaluated by Interventional Cardiology to see if we think there is a need for emergent catheterization given chest pain and elevated troponin.  If not, can be admitted to the HTS service for further evaluation of heart failure.  No history of fever, and by report CBC does not suggest acute infection, but he is definitely at risk for endocarditis given his Anne valve in the pulmonary position.  Echo on Wednesday with markedly worsened EF compared to last echo.  Repeat echo Thursday afternoon to better assess the PV, which is functioning very well with absolutely no evidence of worsening stenosis or endocarditis.   Agree with plan to assess renal arteries given hypertension, recent bump in creatinine when he was more hypotensive on medical management.    Also agree with likely plan for coronary angiography once creatinine has normalized. LHC will likely be on Monday.    HFrEF (heart failure with reduced ejection fraction)  #HFrEF 40%, NYHA I, ACC/AHA class B/C:  - Etiology: Likely structural Heart Disease   - Hemodynamic profile: Perfused and appears euvolemic but elevated BNP (on Entresto) and CXR with prominence of the pulmonary vascular with bilateral pattern of pulmonary infiltrates  - Difficult  compliance as per outpatient cardiology notes, probably taking meds only once a day  - Home GDMT: Carvedilol 25mg bid, Entresto 97-103mg bid, spironolactone held when entresto was initiated    - Ordered NT-ProBNP  - Repeat TTE  - Volume status optimization  - Furosemide 80mg once given, evaluate need of additional dosing  - Daily monitoring: Standing weights, Strict I/O, Na restriction (<2g/day), Keep K>4 and Mg>2  - GDMT: Continue Valsartan/Sacubitril (97/103mg bid), Carvedilol 25mg bid  - Not requiring vasodilator or inotropic support for now    Essential hypertension  - Poor compliance in the past with medications as per cardiologist note  - Strong family history of hypertension.  - Outpatient on amlodipine 10mg, carvedilol 25mg bid, hydralazine 50mg bid and on entresto 97/103mg for HF       - Continue with outpatient medications        VTE Risk Mitigation (From admission, onward)           Ordered     IP VTE HIGH RISK PATIENT  Once         04/30/24 2355     Place sequential compression device  Until discontinued         04/30/24 2355     heparin 25,000 units in dextrose 5% (100 units/ml) IV bolus from bag LOW INTENSITY nomogram - [facility]  As needed (PRN)        Question:  Heparin Infusion Adjustment (DO NOT MODIFY ANSWER)  Answer:  \[email or web address]\[email or web address] LOW INTENSITY nomogram for [facility] [id number]    04/30/24 2050     heparin 25,000 units in dextrose 5% (100 units/ml) IV bolus from bag LOW INTENSITY nomogram - [facility]  As needed (PRN)        Question:  Heparin Infusion Adjustment (DO NOT MODIFY ANSWER)  Answer:  \[email or web address]\[email or web address]\heparin LOW INTENSITY nomogram for [facility] [id number]    04/30/24 2050     heparin 25,000 units in dextrose 5% 250 mL (100 units/mL) infusion LOW INTENSITY nomogram - [facility]  Continuous        Question:  Begin at (units/kg/hr)  Answer:  12 04/30/24 2050                    Maura Harvey PA-C  Cardiology  [facility] - Cardiac  Intensive Care

## 2024-05-03 NOTE — ASSESSMENT & PLAN NOTE
Summary of Diagnosis:    1. 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   - epicardial left ventricular pacing wires in place.   3. 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 Mild pulmonary artery hypertension noted in the past.   5. Trace aortic insufficiency and mild aortic root enlargement  6. Long history of decreased LV function - possibly exacerbated by high afterload.   - mildly improved with ejection fraction around 40-45% on echocardiogram 2023  7. Hypertension - poor compliance in the past with medications, still an issue  Strong family history of hypertension.    Discussion:    last seen by Dr. Corona July 2023.  An echocardiogram in March 2023 looked good although his ejection fraction was around 40-45%, actually improved compared to previous studies.  Blood pressure was again significantly elevated, and it was clear that his compliance with medications was suboptimal.  The plan was to see him back in 6 months, and stressed the importance of medication compliance  Called by an ER in [place].  Patient presented Tuesday with chest pain that started Monday.  Troponin was elevated at 1.85 with their upper limit of normal 0.12.  BNP was significantly elevated 827.  Our most recent BNP a year ago was normal although he has had mild elevation in the past.  Patient was given aspirin and sublingual nitroglycerin, and chest pain improved.  EKG, by report, unchanged with right bundle branch block, left axis deviation.  Discussed with Dr. Beatris Thompson (HTS service) and with Dr. Mckeon in Interventional Cardiology.  Recommendation was for transfer to our emergency room where the patient can be evaluated by Interventional Cardiology to see if we think there is a need for emergent catheterization given chest  pain and elevated troponin.  If not, can be admitted to the [facility] service for further evaluation of heart failure.  No history of fever, and by report CBC does not suggest acute infection, but he is definitely at risk for endocarditis given his Anne valve in the pulmonary position.  Echo on Wednesday with markedly worsened EF compared to last echo.  Repeat echo Thursday afternoon to better assess the PV, which is functioning very well with absolutely no evidence of worsening stenosis or endocarditis.   Agree with plan to assess renal arteries given hypertension, recent bump in creatinine when he was more hypotensive on medical management.    Also agree with likely plan for coronary angiography once creatinine has normalized. LHC will likely be on Monday.

## 2024-05-03 NOTE — NURSING
Nurses Note -- 4 Eyes      5/3/2024   2:29 PM      Skin assessed during: Transfer      [x] No Altered Skin Integrity Present    []Prevention Measures Documented      [] Yes- Altered Skin Integrity Present or Discovered   [] LDA Added if Not in Epic (Describe Wound)   [] New Altered Skin Integrity was Present on Admit and Documented in LDA   [] Wound Image Taken    Wound Care Consulted? No    Attending Nurse:  Jorge DONATO     Second RN/Staff Member:   Greer DONATO

## 2024-05-03 NOTE — PROGRESS NOTES
[facility] - Cardiac Intensive Care  Cardiology  Progress Note    Patient Name: Thomas Horvath  MRN: [id number]  Admission Date: 4/30/2024  Hospital Length of Stay: 2 days  Code Status: Full Code   Attending Physician: Willi Overton MD   Primary Care Physician: Sissy Lacy MD  Expected Discharge Date: 5/3/2024  Principal Problem:NSTEMI (non-ST elevated myocardial infarction)    Subjective:         Interval History:   No more chest pain. Trop peak at 4.6. LHC pending improvement in renal function. VINOD uptrending     Review of Systems   Constitutional: Positive for malaise/fatigue.     Objective:     Vital Signs (Most Recent):  Temp: 97.9 °F (36.6 °C) (05/02/24 1501)  Pulse: 86 (05/02/24 1801)  Resp: (!) 21 (05/02/24 1801)  BP: 106/66 (05/02/24 1801)  SpO2: 96 % (05/02/24 1801) Vital Signs (24h Range):  Temp:  [97.9 °F (36.6 °C)-98.2 °F (36.8 °C)] 97.9 °F (36.6 °C)  Pulse:  [80-95] 86  Resp:  [15-26] 21  SpO2:  [3 %-97 %] 96 %  BP: ()/(50-83) 106/66     Weight: 98.5 kg (217 lb 2.5 oz)  Body mass index is 35.05 kg/m².     SpO2: 96 %         Intake/Output Summary (Last 24 hours) at 5/2/2024 1912  Last data filed at 5/2/2024 1854  Gross per 24 hour   Intake 1217.24 ml   Output 2625 ml   Net -1407.76 ml       Lines/Drains/Airways       Peripheral Intravenous Line  Duration                  Peripheral IV - Single Lumen 04/30/24 1903 20 G Right Antecubital 2 days         Peripheral IV - Single Lumen 04/30/24 2218 20 G Left Antecubital 1 day         Peripheral IV - Single Lumen 05/02/24 0642 20 G Left;Posterior Hand <1 day                       Physical Exam  Constitutional:       General: He is not in acute distress.     Appearance: Normal appearance. He is not ill-appearing.   HENT:      Head: Normocephalic and atraumatic.      Nose: No congestion.      Mouth/Throat:      Mouth: Mucous membranes are moist.   Eyes:      Conjunctiva/sclera: Conjunctivae normal.   Cardiovascular:      Rate and Rhythm: Normal  "rate and regular rhythm.      Pulses: Normal pulses.   Pulmonary:      Effort: No respiratory distress.      Comments: Crackles bilaterally   Abdominal:      General: Abdomen is flat. There is no distension.      Palpations: Abdomen is soft.   Musculoskeletal:      Cervical back: Normal range of motion.      Right lower leg: No edema.      Left lower leg: No edema.   Skin:     Capillary Refill: Capillary refill takes less than 2 seconds.      Findings: No rash.   Neurological:      Mental Status: He is alert and oriented to person, place, and time.   Psychiatric:         Mood and Affect: Mood normal.            Significant Labs: All pertinent lab results from the last 24 hours have been reviewed.    Assessment and Plan:         * NSTEMI (non-ST elevated myocardial infarction)  NSTEMI - ISAI 3 (13%), WING 64 (1.2%)  - Risk factors and established atherosclerotic disease: Young patient with congenital heart disease, no major atherosclerotic risk factors  - Diagnosis - Atypical chest pain (burning and tightness on right side of the chest), EKG shows left sided ST-T wave changes possible for ischemia, and myocardial injury is evidenced by trop of 2.121 and 2.104 (at OSH 1.85)  - Nitro sublingual with mild improvement of symptoms  - Due to nonspecific symptoms, and burning in nature, with some complain of "acid" in the back of his throat, will attempt GI cocktail    IC consulted, cath once renal function improves  Peak trop 4.6  - TTE complete, EF of 30%, daisy valve functioning normally   - ACS protocol: Continue ASA 81mg qd, heparin gtt, atorvastatin 80mg qhs  - GDMT: Carvedilol  - Anti-ischemic medications: Nitrates sublingual and morphine PRN      VINOD (acute kidney injury)  Dramatic drop in blood pressure and rise in creatine after restarting home BP meds medications  He had previously reported taking all medications; however, suspect non compliance given his response to restarting these meds including " entresto  Holding entresto   Decreased coreg given ADHF      HFrEF (heart failure with reduced ejection fraction)  mild to moderate volume overload  Congenital following  Continue lasix 10mg/hr    Essential hypertension  - as above        VTE Risk Mitigation (From admission, onward)           Ordered     IP VTE HIGH RISK PATIENT  Once         04/30/24 2355     Place sequential compression device  Until discontinued         04/30/24 2355     heparin 25,000 units in dextrose 5% (100 units/ml) IV bolus from bag LOW INTENSITY nomogram - [facility]  As needed (PRN)        Question:  Heparin Infusion Adjustment (DO NOT MODIFY ANSWER)  Answer:  [email or web address]\epic\Images\Pharmacy\HeparinInfusions\heparin LOW INTENSITY nomogram for [facility] [id number].pdf    04/30/24 2050     heparin 25,000 units in dextrose 5% (100 units/ml) IV bolus from bag LOW INTENSITY nomogram - [facility]  As needed (PRN)        Question:  Heparin Infusion Adjustment (DO NOT MODIFY ANSWER)  Answer:  \\[email or web address]\epic\Images\Pharmacy\HeparinInfusions\heparin LOW INTENSITY nomogram for [facility] [id number].pdf    04/30/24 2050     heparin 25,000 units in dextrose 5% 250 mL (100 units/mL) infusion LOW INTENSITY nomogram - [facility]  Continuous        Question:  Begin at (units/kg/hr)  Answer:  12 04/30/24 2050                    Jonatan Randall MD  Cardiology  [facility] - Cardiac Intensive Care     monthly

## 2024-05-03 NOTE — ASSESSMENT & PLAN NOTE
"NSTEMI - ISAI 3 (13%), WING 64 (1.2%)  - Risk factors and established atherosclerotic disease: Young patient with congenital heart disease, no major atherosclerotic risk factors  - Diagnosis - Atypical chest pain (burning and tightness on right side of the chest), EKG shows left sided ST-T wave changes possible for ischemia, and myocardial injury is evidenced by trop of 2.121 and 2.104 (at OSH 1.85)  - Nitro sublingual with mild improvement of symptoms  - Due to nonspecific symptoms, and burning in nature, with some complain of "acid" in the back of his throat, will attempt GI cocktail    - Admit to CICU  - Follow up studies - serial troponin  - Ordered TTE  - Telemetry to monitor for ventricular arrhythmias  - ACS protocol: Continue ASA 81mg qd, heparin gtt, atorvastatin 80mg qhs  - GDMT: Carvedilol  - Anti-ischemic medications: Nitrates sublingual and morphine PRN  - Possible PCI on Monday.  "

## 2024-05-03 NOTE — PROGRESS NOTES
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 Echo showed normal daisy valve and EF of 30%. PCI after renal function improves.    Interval History:   -NAEON, VSS.  -Denies any pain. Step down to cardiology floor.   -Renal function improving.  -Possible PCI on Monday.      ROS    Constitutional: Negative.   HENT: Negative.     Cardiovascular:  . Negative for chest pain, claudication, cyanosis, dyspnea on exertion, irregular heartbeat, leg swelling, near-syncope, orthopnea, palpitations, paroxysmal nocturnal dyspnea and syncope.   Respiratory: Negative.     Endocrine: Negative.    Musculoskeletal: Negative.    Gastrointestinal: Negative.    Genitourinary: Negative.    Neurological: Negative.    Objective:     Vital Signs (Most Recent):  Temp: 98.4 °F (36.9 °C) (05/03/24 0800)  Pulse: 95 (05/03/24 1100)  Resp: (!) 31 (05/03/24 1100)  BP: (!) 100/59 (05/03/24 1100)  SpO2: 97 % (05/03/24 1100) Vital Signs (24h Range):  Temp:  [97.9 °F (36.6 °C)-99 °F (37.2 °C)] 98.4 °F (36.9 °C)  Pulse:  [82-95] 95  Resp:  [17-31] 31  SpO2:  [90 %-97 %] 97 %  BP: ()/(50-81) 100/59     Weight: 98.5 kg (217 lb 2.5 oz)  Body mass index is 35.05 kg/m².     SpO2: 97 %         Intake/Output Summary (Last 24 hours) at 5/3/2024 1217  Last data filed at 5/3/2024 0823  Gross per 24 hour   Intake 826.46 ml   Output 1950 ml    Net -1123.54 ml       Lines/Drains/Airways       Peripheral Intravenous Line  Duration                  Peripheral IV - Single Lumen 04/30/24 1903 20 G Right Antecubital 2 days         Peripheral IV - Single Lumen 04/30/24 2218 20 G Left Antecubital 2 days         Peripheral IV - Single Lumen 05/02/24 0642 20 G Left;Posterior Hand 1 day                       Physical Exam     Vitals and nursing note reviewed.   Constitutional:       General: He is not in acute distress.     Appearance: Normal appearance. He is normal weight. He is not ill-appearing or diaphoretic.   HENT:      Head: Normocephalic and atraumatic.   Eyes:      Pupils: Pupils are equal, round, and reactive to light.   Cardiovascular:      Rate and Rhythm: Normal rate and regular rhythm.      Pulses: Normal pulses.      Heart sounds: Normal heart sounds.   Pulmonary:      Effort: Pulmonary effort is normal.      Breath sounds: Normal breath sounds.   Abdominal:      General: Abdomen is flat. Bowel sounds are normal. There is no distension.      Palpations: Abdomen is soft. There is no mass.      Tenderness: There is no abdominal tenderness.   Musculoskeletal:         General: Normal range of motion.   Skin:     General: Skin is warm.      Capillary Refill: Capillary refill takes less than 2 seconds.   Neurological:      General: No focal deficit present.      Mental Status: He is alert and oriented to person, place, and time.     Significant Labs: All pertinent lab results from the last 24 hours have been reviewed.    Significant Imaging:  All pertinent imaging results from the last 24 hours have been reviewed.     Assessment and Plan:         * NSTEMI (non-ST elevated myocardial infarction)  NSTEMI - ISAI 3 (13%), WING 64 (1.2%)  - Risk factors and established atherosclerotic disease: Young patient with congenital heart disease, no major atherosclerotic risk factors  - Diagnosis - Atypical chest pain (burning and tightness on right side of the  "chest), EKG shows left sided ST-T wave changes possible for ischemia, and myocardial injury is evidenced by trop of 2.121 and 2.104 (at OSH 1.85)  - Nitro sublingual with mild improvement of symptoms  - Due to nonspecific symptoms, and burning in nature, with some complain of "acid" in the back of his throat, will attempt GI cocktail    - Admit to CICU  - Follow up studies - serial troponin  - Ordered TTE  - Telemetry to monitor for ventricular arrhythmias  - ACS protocol: Continue ASA 81mg qd, heparin gtt, atorvastatin 80mg qhs  - GDMT: Carvedilol  - Anti-ischemic medications: Nitrates sublingual and morphine PRN  - Possible PCI on Monday.    VINOD (acute kidney injury)  Dramatic drop in blood pressure and rise in creatine after restarting home BP meds medications  He had previously reported taking all medications; however, suspect non compliance given his response to restarting these meds including entresto  Holding entresto   Decreased coreg given ADHF    Adult congenital heart disease  Summary of Diagnosis:    1. 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   - epicardial left ventricular pacing wires in place.   3. 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 Mild pulmonary artery hypertension noted in the past.   5. Trace aortic insufficiency and mild aortic root enlargement  6. Long history of decreased LV function - possibly exacerbated by high afterload.   - mildly improved with ejection fraction around 40-45% on echocardiogram 2023  7. Hypertension - poor compliance in the past with medications, still an issue  Strong family history of hypertension.    Discussion:    last seen by Dr. Corona July 2023.  An echocardiogram in March 2023 looked good although his ejection fraction was around 40-45%, actually " improved compared to previous studies.  Blood pressure was again significantly elevated, and it was clear that his compliance with medications was suboptimal.  The plan was to see him back in 6 months, and stressed the importance of medication compliance  Called by an ER in [place].  Patient presented Tuesday with chest pain that started Monday.  Troponin was elevated at 1.85 with their upper limit of normal 0.12.  BNP was significantly elevated 827.  Our most recent BNP a year ago was normal although he has had mild elevation in the past.  Patient was given aspirin and sublingual nitroglycerin, and chest pain improved.  EKG, by report, unchanged with right bundle branch block, left axis deviation.  Discussed with Dr. Beatris Thompson (HTS service) and with Dr. Mckeon in Interventional Cardiology.  Recommendation was for transfer to our emergency room where the patient can be evaluated by Interventional Cardiology to see if we think there is a need for emergent catheterization given chest pain and elevated troponin.  If not, can be admitted to the HTS service for further evaluation of heart failure.  No history of fever, and by report CBC does not suggest acute infection, but he is definitely at risk for endocarditis given his Anne valve in the pulmonary position.  Echo on Wednesday with markedly worsened EF compared to last echo.  Repeat echo Thursday afternoon to better assess the PV, which is functioning very well with absolutely no evidence of worsening stenosis or endocarditis.   Agree with plan to assess renal arteries given hypertension, recent bump in creatinine when he was more hypotensive on medical management.    Also agree with likely plan for coronary angiography once creatinine has normalized. LHC will likely be on Monday.    HFrEF (heart failure with reduced ejection fraction)  #HFrEF 40%, NYHA I, ACC/AHA class B/C:  - Etiology: Likely structural Heart Disease   - Hemodynamic profile: Perfused  and appears euvolemic but elevated BNP (on Entresto) and CXR with prominence of the pulmonary vascular with bilateral pattern of pulmonary infiltrates  - Difficult compliance as per outpatient cardiology notes, probably taking meds only once a day  - Home GDMT: Carvedilol 25mg bid, Entresto 97-103mg bid, spironolactone held when entresto was initiated    - Ordered NT-ProBNP  - Repeat TTE  - Volume status optimization  - Furosemide 80mg once given, evaluate need of additional dosing  - Daily monitoring: Standing weights, Strict I/O, Na restriction (<2g/day), Keep K>4 and Mg>2  - GDMT: Continue Valsartan/Sacubitril (97/103mg bid), Carvedilol 25mg bid  - Not requiring vasodilator or inotropic support for now    Essential hypertension  - Poor compliance in the past with medications as per cardiologist note  - Strong family history of hypertension.  - Outpatient on amlodipine 10mg, carvedilol 25mg bid, hydralazine 50mg bid and on entresto 97/103mg for HF       - Continue with outpatient medications        VTE Risk Mitigation (From admission, onward)           Ordered     IP VTE HIGH RISK PATIENT  Once         04/30/24 2355     Place sequential compression device  Until discontinued         04/30/24 2355     heparin 25,000 units in dextrose 5% (100 units/ml) IV bolus from bag LOW INTENSITY nomogram - [facility]  As needed (PRN)        Question:  Heparin Infusion Adjustment (DO NOT MODIFY ANSWER)  Answer:  [email or web address]\epic\Images\Pharmacy\HeparinInfusions\heparin LOW INTENSITY nomogram for [facility] [id number].pdf    04/30/24 2050     heparin 25,000 units in dextrose 5% (100 units/ml) IV bolus from bag LOW INTENSITY nomogram - [facility]  As needed (PRN)        Question:  Heparin Infusion Adjustment (DO NOT MODIFY ANSWER)  Answer:  \[email or web address]\epic\Images\Pharmacy\HeparinInfusions\heparin LOW INTENSITY nomogram for [facility] [id number].pdf    04/30/24 2050     heparin 25,000 units in dextrose 5% 250 mL (100 units/mL) infusion LOW INTENSITY nomogram -  [facility]  Continuous        Question:  Begin at (units/kg/hr)  Answer:  12 04/30/24 2050                    Areli Nicole MD  Cardiology  [facility] - Cardiac Intensive Care

## 2024-05-03 NOTE — SUBJECTIVE & OBJECTIVE
Interval History:    Pt continues to feel well and his chest pain has completely resolved. He does report feeling fatigued. He has been somewhat hypotensive, suggesting that he has been noncompliant with his intensive antihypertensive medical regimen at home.      Troponin levels peaked at 4.6. Creatinine increased to 1.8 this AM. CV US of the renal arteries yesterday shows insignificant stenosis in both arteries. Elevated renal resistive index suggestive of intrinsic kidney disease.[facility] pending improvement in renal function and will likely be on Monday.    Pt moving to the cardiology step-down unit today (currently waiting on room.)     Past Medical History:   Diagnosis Date    Aortic insufficiency     trace    Homograft cardiac valve stenosis     mild    Hypertension     Left ventricular dysfunction     Pulmonary artery hypertension     RV hypertension    Right ventricular dilation     S/P TOF (tetralogy of Fallot) repair 12/18/2014    TOF (tetralogy of Fallot)        Past Surgical History:   Procedure Laterality Date    balloon dilation of pulmonary atrery homograft  2008    CARDIAC VALVE SURGERY      7/23/15 [facility]    epicardial pacing wires      LV    pulmonary valve replacement  July 7, 2006    TETRALOGY OF FALLOT REPAIR  1990       Review of patient's allergies indicates:  No Known Allergies    Current Facility-Administered Medications   Medication Dose Route Frequency Provider Last Rate Last Admin    acetaminophen tablet 650 mg  650 mg Oral Q8H PRN Kyler Alberto MD   650 mg at 05/02/24 1958    amLODIPine tablet 10 mg  10 mg Oral Daily Kyler Alberto MD   10 mg at 05/03/24 0855    aspirin chewable tablet 81 mg  81 mg Oral Daily Kyler Alberto MD   81 mg at 05/03/24 0856    atorvastatin tablet 80 mg  80 mg Oral QHS Kyler Alberto MD   80 mg at 05/02/24 2000    carvediloL tablet 3.125 mg  3.125 mg Oral BID Jonatan MD   3.125 mg at 05/03/24 0856     furosemide (Lasix) 500 mg in 50 mL infusion (conc: 10 mg/mL)  10 mg/hr Intravenous Continuous Kyler Alberto MD 1 mL/hr at 24 0701 10 mg/hr at 24 0701    heparin 25,000 units in dextrose 5% (100 units/ml) IV bolus from bag LOW INTENSITY nomogram - [facility]  56.4 Units/kg (Adjusted) Intravenous PRN Kyler Alberto MD   3,990 Units at 24 1912    heparin 25,000 units in dextrose 5% (100 units/ml) IV bolus from bag LOW INTENSITY nomogram - [facility]  30 Units/kg (Adjusted) Intravenous PRN Kyler Alberto MD        heparin 25,000 units in dextrose 5% 250 mL (100 units/mL) infusion LOW INTENSITY nomogram - [facility]  0-40 Units/kg/hr (Adjusted) Intravenous Continuous Kyler Alberto MD 12.8 mL/hr at 24 0701 18 Units/kg/hr at 24 0701    hydrALAZINE tablet 25 mg  25 mg Oral Q12H Jonatan MD   25 mg at 24 0910    melatonin tablet 9 mg  9 mg Oral Nightly PRN Kyler Alberto MD   9 mg at 24 0357    nitroGLYCERIN in 5 % dextrose 50 mg/250 mL (200 mcg/mL) infusion  0-400 mcg/min Intravenous Continuous Kyler Alberto MD   Stopped at 24 1429    sodium chloride 0.9% flush 10 mL  10 mL Intravenous PRN Kyler Alberto MD         Family History       Problem Relation (Age of Onset)    Hypertension Mother    No Known Problems Father, Sister, Brother, Maternal Grandmother, Maternal Grandfather, Paternal Grandmother, Paternal Grandfather, Maternal Aunt, Maternal Uncle, Paternal Aunt, Paternal Uncle          Tobacco Use    Smoking status: Former     Current packs/day: 0.00     Types: Cigarettes     Start date: 2006     Quit date: 2015     Years since quittin.9    Smokeless tobacco: Never    Tobacco comments:     marijuana   Substance and Sexual Activity    Alcohol use: No     Alcohol/week: 0.0 standard drinks of alcohol    Drug use: Yes     Frequency: 7.0 times per week     Types:  Marijuana    Sexual activity: Yes     Partners: Female     Objective:     Vital Signs (Most Recent):  Temp: 98.4 °F (36.9 °C) (05/03/24 0301)  Pulse: 95 (05/03/24 0900)  Resp: (!) 22 (05/03/24 0900)  BP: 124/81 (05/03/24 0900)  SpO2: 96 % (05/03/24 0900) Vital Signs (24h Range):  Temp:  [97.9 °F (36.6 °C)-99 °F (37.2 °C)] 98.4 °F (36.9 °C)  Pulse:  [82-95] 95  Resp:  [17-26] 22  SpO2:  [90 %-97 %] 96 %  BP: ()/(50-81) 124/81     Weight: 98.5 kg (217 lb 2.5 oz)  Body mass index is 35.05 kg/m².    SpO2: 96 %         Intake/Output Summary (Last 24 hours) at 5/3/2024 1031  Last data filed at 5/3/2024 0823  Gross per 24 hour   Intake 1066.46 ml   Output 2300 ml   Net -1233.54 ml       Lines/Drains/Airways       Peripheral Intravenous Line  Duration                  Peripheral IV - Single Lumen 04/30/24 1903 20 G Right Antecubital 2 days         Peripheral IV - Single Lumen 04/30/24 2218 20 G Left Antecubital 2 days         Peripheral IV - Single Lumen 05/02/24 0642 20 G Left;Posterior Hand 1 day                     Physical Exam  Constitutional:       General: He is not in acute distress.     Appearance: He is obese. He is not ill-appearing.      Comments: Positive for malaise/fatigue   HENT:      Head: Normocephalic and atraumatic.   Eyes:      Extraocular Movements: Extraocular movements intact.      Pupils: Pupils are equal, round, and reactive to light.   Cardiovascular:      Rate and Rhythm: Normal rate and regular rhythm.      Pulses: Normal pulses.      Comments: No murmur heard on auscultation. Fixed & split S2 sound  Pulmonary:      Effort: Pulmonary effort is normal.      Comments: Crackles bilaterally  Abdominal:      General: There is no distension.      Palpations: Abdomen is soft.      Tenderness: There is no abdominal tenderness.   Musculoskeletal:      Cervical back: Normal range of motion and neck supple.   Skin:     General: Skin is warm.      Capillary Refill: Capillary refill takes less than 2  "seconds.   Neurological:      General: No focal deficit present.      Mental Status: He is oriented to person, place, and time.   Psychiatric:         Mood and Affect: Mood normal.         Behavior: Behavior normal.          Significant Labs:   Recent Labs   Lab 05/02/24  0333 05/02/24  1442 05/03/24  0508   * 128* 112*    135* 135*   K 3.8 3.9 3.8    100 100   CO2 23 20* 22*   BUN 27* 32* 32*   CREATININE 1.6* 2.0* 1.8*   CALCIUM 9.3 9.8 9.4   MG 2.3  --  2.1   , CMP   Recent Labs   Lab 05/02/24  0333 05/02/24  1442 05/03/24  0508    135* 135*   K 3.8 3.9 3.8    100 100   CO2 23 20* 22*   * 128* 112*   BUN 27* 32* 32*   CREATININE 1.6* 2.0* 1.8*   CALCIUM 9.3 9.8 9.4   PROT 7.1  --  7.1   ALBUMIN 3.2*  --  3.2*   BILITOT 0.7  --  0.5   ALKPHOS 82  --  85   AST 31  --  22   ALT 17  --  13   ANIONGAP 12 15 13   , CBC   Recent Labs   Lab 05/02/24 0333 05/03/24  0508   WBC 12.22 11.93   HGB 16.6 16.2   HCT 52.4 51.1    319   , INR   No results for input(s): "INR", "PROTIME" in the last 48 hours.  , Lipid Panel   No results for input(s): "CHOL", "HDL", "LDLCALC", "TRIG", "CHOLHDL" in the last 48 hours.  ,   Pathology Results  (Last 10 years)      None        , Troponin   Recent Labs   Lab 05/02/24  1743 05/02/24  2307 05/03/24  0508   TROPONINI 3.052* 3.055* 3.205*   , and   Recent Lab Results  (Last 5 results in the past 24 hours)        05/03/24  0508   05/02/24  2307   05/02/24  1743   05/02/24  1459   05/02/24  1442        Mid abd aorta EDV       11.00         Mid abd aorta PSV       83.00         Albumin 3.2               ALP 85               ALT 13               Anion Gap 13         15  Comment: CORRECTED RESULT; previously reported as 14 on 05/02/2024 at 15:56.  [C]       PTT 50.5  Comment: Refer to local heparin nomogram for intensity/dose specific   therapeutic   range.                 AST 22               Baso # 0.11               Basophil % 0.9               " BILIRUBIN TOTAL 0.5  Comment: For infants and newborns, interpretation of results should be based  on gestational age, weight and in agreement with clinical  observations.    Premature Infant recommended reference ranges:  Up to 24 hours.............<8.0 mg/dL  Up to 48 hours............<12.0 mg/dL  3-5 days..................<15.0 mg/dL  6-29 days.................<15.0 mg/dL                 BUN 32         32       Calcium 9.4         9.8       Chloride 100         100       CO2 22         20       Creatinine 1.8         2.0       Differential Method Automated               eGFR 50.0         44.1       Eos # 0.8               Eos % 6.7               Glucose 112         128       Gran # (ANC) 7.2               Gran % 60.3               Hematocrit 51.1               Hemoglobin 16.2               Immature Grans (Abs) 0.04  Comment: Mild elevation in immature granulocytes is non specific and   can be seen in a variety of conditions including stress response,   acute inflammation, trauma and pregnancy. Correlation with other   laboratory and clinical findings is essential.                 Immature Granulocytes 0.3               Left renal dist castellanos       16.00         Left renal dist sys       48.00         Left Renal Ultrasound Acceleration Time Measurement 1       46.00         Left Renal Ultrasound Acceleration Time Measurement 2       40.00         Left Renal Ultrasound Acceleration Time Measurement 3       51.00         Left Renal Ultrasound Acceleration Time       51.00         Left Renal Ultrasound Kidney Size Measurement 1       10.10         Left Renal Ultrasound Kidney Size       10.10         Left Renal Ultrasound Resistive Index Measurement 1       0.73         Left Renal Ultrasound Resistive Index Measurement 2       0.71         Left Renal Ultrasound Resistive Index Measurement 3       0.68         Left Renal Ultrasound Resistive Index Measurement       0.73         Lymph # 2.5               Lymph % 20.6                Magnesium  2.1               MCH 27.3               MCHC 31.7               MCV 86               Mono # 1.3               Mono % 11.2               MPV 10.7               nRBC 0               Left RAR       0.58         Right RAR       1.12         Left Renal Highest EDV       16.00         Left Renal Highest PSV       48.00         Right Renal Highest EDV       18.00         Right Renal Highest PSV       93.00         Phosphorus Level 5.8               Platelet Count 319               Potassium 3.8         3.9       PROTEIN TOTAL 7.1               RBC 5.94               Right renal dist castellanos       18.00         Right renal dist sys       93.00         RDW 13.8               Right Renal Ultrasound Acceleration Time Measurement 1       71.00         Right Renal Ultrasound Acceleration Time Measurement 2       66.00         Right Renal Ultrasound Acceleration Time Measurement 3       63.00         Right Renal Ultrasound Acceleration Time       71.00         Right Renal Ultrasound Kidney Size Measurement 1       10.00         Right Renal Ultrasound Kidney Size       10.00         Right Renal Ultrasound Resistive Index Measurement 1       0.78         Right Renal Ultrasound Resistive Index Measurement 2       0.75         Right Renal Ultrasound Resistive Index Measurement 3       0.65         Right Renal Ultrasound Resistive Index       0.78         Sodium 135         135       Troponin I 3.205  Comment: The reference interval for Troponin I represents the 99th percentile   cutoff   for our facility and is consistent with 3rd generation assay   performance.     3.055  Comment: The reference interval for Troponin I represents the 99th percentile   cutoff   for our facility and is consistent with 3rd generation assay   performance.     3.052  Comment: The reference interval for Troponin I represents the 99th percentile   cutoff   for our facility and is consistent with 3rd generation assay   performance.              WBC 11.93                                       [C] - Corrected Result               Significant Imaging:     CV US Renal Artery Stenosis Hypertension (5/2/24)      There is insignificant stenosis (0-59%) in the Right Renal Artery.    Elevated right renal resistive index suggestive of intrinsic kidney disease.    Right kidney 10.00 cm.    There is insignificant stenosis (0-59%) in the Left Renal Artery.    Elevated left renal resistive index suggestive of intrinsic kidney disease.    Left kidney 10.10 cm.    This was a technically difficult study. This study was limited due to excessive bowel gas.    Echo (5/1/24 at 8:55 AM)      Left Ventricle: Ventricular mass is normal. Mildly increased wall thickness. Severe global hypokinesis present. Septal motion is abnormal. There is severely reduced systolic function with a visually estimated ejection fraction of 15 - 20%. Ejection fraction by visual approximation is 18%. The biplane LVEF was much higher but not accurate due to technical issues. There is diastolic dysfunction but grade cannot be determined.    Right Ventricle: Systolic function is moderately reduced.    Right Atrium: Right atrium is moderately dilated.    IVC/SVC: Intermediate venous pressure at 8 mmHg.    Echo (5/1/24 at 1:38 PM)    IMPRESSION:  Patient admitted for suspected MI with poor LV function on initial echocardiogram-  Technically difficult acoustic windows.  Qualitative impression of at least moderately dilated right atrium  No obvious signs of previously reported dilation of the tricuspid annulus.  There is trivial to mild tricuspid insufficiency.  Mild right ventricle hypertrophy with at least moderate dilation.  Qualitative impression of moderate to severely reduced systolic function of the inlet and apical segments of the right ventricle with large outflow tract patch.  Right ventricular pressure estimated 16 mmHg above right atrial pressure from reasonably well defined TR doppler  profile.  Echodensity consistent with patch repair of ventricular septal defect and malalignment of the ventricular septum with no residual shunt demonstrated.  Images consistent with Anne implant in pulmonary homograft with peak velocity <1.7 m/sec and mean <7 mm Hg.  No significant pulmonary insufficiency.  2D imaging demonstrates well-developed confluence pulmonary branches.  The left atrium appears normal in size with walls not well defined for measurement.  Trivial mitral valve insufficiency.  Qualitative impression of mild concentric left ventricular hypertrophy.  Flattened septal motion with decreased movement of the LV free wall, SF = 16% and EF qualitatively estimated 25 -30% from off axis apical views.  Trivial aortic valve insufficiency.  Aortic dimensions:  Sinuses of Valsalva = 38 mm.  ST junction              = 33 mm.  Ascending aorta      = 38 mm.  Normal size aortic arch with no evidence of coarctation.  No pericardial effusion.    ROS

## 2024-05-03 NOTE — SUBJECTIVE & OBJECTIVE
Interval History:   -NAEON, VSS.  -Denies any pain. Step down to cardiology floor.   -Renal function improving.  -Possible PCI on Monday.      ROS    Constitutional: Negative.   HENT: Negative.     Cardiovascular:  . Negative for chest pain, claudication, cyanosis, dyspnea on exertion, irregular heartbeat, leg swelling, near-syncope, orthopnea, palpitations, paroxysmal nocturnal dyspnea and syncope.   Respiratory: Negative.     Endocrine: Negative.    Musculoskeletal: Negative.    Gastrointestinal: Negative.    Genitourinary: Negative.    Neurological: Negative.    Objective:     Vital Signs (Most Recent):  Temp: 98.4 °F (36.9 °C) (05/03/24 0800)  Pulse: 95 (05/03/24 1100)  Resp: (!) 31 (05/03/24 1100)  BP: (!) 100/59 (05/03/24 1100)  SpO2: 97 % (05/03/24 1100) Vital Signs (24h Range):  Temp:  [97.9 °F (36.6 °C)-99 °F (37.2 °C)] 98.4 °F (36.9 °C)  Pulse:  [82-95] 95  Resp:  [17-31] 31  SpO2:  [90 %-97 %] 97 %  BP: ()/(50-81) 100/59     Weight: 98.5 kg (217 lb 2.5 oz)  Body mass index is 35.05 kg/m².     SpO2: 97 %         Intake/Output Summary (Last 24 hours) at 5/3/2024 1217  Last data filed at 5/3/2024 0823  Gross per 24 hour   Intake 826.46 ml   Output 1950 ml   Net -1123.54 ml       Lines/Drains/Airways       Peripheral Intravenous Line  Duration                  Peripheral IV - Single Lumen 04/30/24 1903 20 G Right Antecubital 2 days         Peripheral IV - Single Lumen 04/30/24 2218 20 G Left Antecubital 2 days         Peripheral IV - Single Lumen 05/02/24 0642 20 G Left;Posterior Hand 1 day                       Physical Exam     Vitals and nursing note reviewed.   Constitutional:       General: He is not in acute distress.     Appearance: Normal appearance. He is normal weight. He is not ill-appearing or diaphoretic.   HENT:      Head: Normocephalic and atraumatic.   Eyes:      Pupils: Pupils are equal, round, and reactive to light.   Cardiovascular:      Rate and Rhythm: Normal rate and regular  rhythm.      Pulses: Normal pulses.      Heart sounds: Normal heart sounds.   Pulmonary:      Effort: Pulmonary effort is normal.      Breath sounds: Normal breath sounds.   Abdominal:      General: Abdomen is flat. Bowel sounds are normal. There is no distension.      Palpations: Abdomen is soft. There is no mass.      Tenderness: There is no abdominal tenderness.   Musculoskeletal:         General: Normal range of motion.   Skin:     General: Skin is warm.      Capillary Refill: Capillary refill takes less than 2 seconds.   Neurological:      General: No focal deficit present.      Mental Status: He is alert and oriented to person, place, and time.     Significant Labs: All pertinent lab results from the last 24 hours have been reviewed.    Significant Imaging:  All pertinent imaging results from the last 24 hours have been reviewed.

## 2024-05-03 NOTE — HOSPITAL COURSE
32yo M patient with repaired Tetralogy of Fallot (right arch, right coronary from left sinus), replacement of pulmonary valve with a 23-mm pulmonary homograft (07/07/2006), presented with chest pain and elevated troponin, ACS was started for NSTEMI. Echo showed normal daisy valve and EF of 30%. LHC 5/6/24 unremarkable for coronary artery disease; normal LHC. Upon initiation of prescribed home GDMT, patient became hypotensive, likely indiciative of poor home med compliance. Coreg was gently started with uptitration to 6.25 BID and aldactone 25 was started as well. Hydralazine was added at 25mg BID. Patient will need to be uptitrated to adequate GDMT with SGLT2 inhib and ARNI after discharge. Patient was diuresed with resolution BNP elevation; discharging with continued lasix po 80mg. Patient to follow up with Dr. Corona in Adult [facility] as well as with PCP.

## 2024-05-03 NOTE — ASSESSMENT & PLAN NOTE
Summary of Diagnosis:    1. 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   - epicardial left ventricular pacing wires in place.   3. 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 Mild pulmonary artery hypertension noted in the past.   5. Trace aortic insufficiency and mild aortic root enlargement  6. Long history of decreased LV function - possibly exacerbated by high afterload.   - mildly improved with ejection fraction around 40-45% on echocardiogram 2023  7. Hypertension - poor compliance in the past with medications, still an issue  Strong family history of hypertension.    Discussion:    last seen by Dr. Corona July 2023.  An echocardiogram in March 2023 looked good although his ejection fraction was around 40-45%, actually improved compared to previous studies.  Blood pressure was again significantly elevated, and it was clear that his compliance with medications was suboptimal.  The plan was to see him back in 6 months, and stressed the importance of medication compliance  Called by an ER in [place].  Patient presented Tuesday with chest pain that started Monday.  Troponin was elevated at 1.85 with their upper limit of normal 0.12.  BNP was significantly elevated 827.  Our most recent BNP a year ago was normal although he has had mild elevation in the past.  Patient was given aspirin and sublingual nitroglycerin, and chest pain improved.  EKG, by report, unchanged with right bundle branch block, left axis deviation.  Discussed with Dr. Beatris Thompson (HTS service) and with Dr. Mckeon in Interventional Cardiology.  Recommendation was for transfer to our emergency room where the patient can be evaluated by Interventional Cardiology to see if we think there is a need for emergent catheterization given chest  pain and elevated troponin.  If not, can be admitted to the [facility] service for further evaluation of heart failure.  No history of fever, and by report CBC does not suggest acute infection, but he is definitely at risk for endocarditis given his Anne valve in the pulmonary position.  Echo on Wednesday with markedly worsened EF compared to last echo.  Repeat echo Thursday afternoon to better assess the PV, which is functioning very well with absolutely no evidence of worsening stenosis or endocarditis.   Agree with plan to assess renal arteries given hypertension, recent bump in creatinine when he was more hypotensive on medical management.    Also agree with likely plan for coronary angiography once creatinine has normalized. LHC will likely be on Monday.

## 2024-05-03 NOTE — ASSESSMENT & PLAN NOTE
Dramatic drop in blood pressure and rise in creatine after restarting home BP meds medications  He had previously reported taking all medications; however, suspect non compliance given his response to restarting these meds including entresto  Holding entresto   Decreased coreg given ADHF

## 2024-05-04 LAB
ALBUMIN SERPL BCP-MCNC: 3.7 G/DL (ref 3.5–5.2)
ALP SERPL-CCNC: 83 U/L (ref 55–135)
ALT SERPL W/O P-5'-P-CCNC: 16 U/L (ref 10–44)
ANION GAP SERPL CALC-SCNC: 13 MMOL/L (ref 8–16)
APTT PPP: 55.4 SEC (ref 21–32)
AST SERPL-CCNC: 25 U/L (ref 10–40)
BASOPHILS # BLD AUTO: 0.1 K/UL (ref 0–0.2)
BASOPHILS NFR BLD: 0.8 % (ref 0–1.9)
BILIRUB SERPL-MCNC: 0.7 MG/DL (ref 0.1–1)
BUN SERPL-MCNC: 29 MG/DL (ref 6–20)
CALCIUM SERPL-MCNC: 10.2 MG/DL (ref 8.7–10.5)
CHLORIDE SERPL-SCNC: 100 MMOL/L (ref 95–110)
CO2 SERPL-SCNC: 26 MMOL/L (ref 23–29)
CREAT SERPL-MCNC: 1.5 MG/DL (ref 0.5–1.4)
DIFFERENTIAL METHOD BLD: ABNORMAL
EOSINOPHIL # BLD AUTO: 0.4 K/UL (ref 0–0.5)
EOSINOPHIL NFR BLD: 3.4 % (ref 0–8)
ERYTHROCYTE [DISTWIDTH] IN BLOOD BY AUTOMATED COUNT: 14.3 % (ref 11.5–14.5)
EST. GFR  (NO RACE VARIABLE): >60 ML/MIN/1.73 M^2
GLUCOSE SERPL-MCNC: 84 MG/DL (ref 70–110)
HCT VFR BLD AUTO: 53.6 % (ref 40–54)
HGB BLD-MCNC: 16.7 G/DL (ref 14–18)
IMM GRANULOCYTES # BLD AUTO: 0.04 K/UL (ref 0–0.04)
IMM GRANULOCYTES NFR BLD AUTO: 0.3 % (ref 0–0.5)
LYMPHOCYTES # BLD AUTO: 1.7 K/UL (ref 1–4.8)
LYMPHOCYTES NFR BLD: 14.2 % (ref 18–48)
MAGNESIUM SERPL-MCNC: 2.2 MG/DL (ref 1.6–2.6)
MCH RBC QN AUTO: 27.6 PG (ref 27–31)
MCHC RBC AUTO-ENTMCNC: 31.2 G/DL (ref 32–36)
MCV RBC AUTO: 88 FL (ref 82–98)
MONOCYTES # BLD AUTO: 1.4 K/UL (ref 0.3–1)
MONOCYTES NFR BLD: 11.7 % (ref 4–15)
NEUTROPHILS # BLD AUTO: 8.5 K/UL (ref 1.8–7.7)
NEUTROPHILS NFR BLD: 69.6 % (ref 38–73)
NRBC BLD-RTO: 0 /100 WBC
PHOSPHATE SERPL-MCNC: 4.2 MG/DL (ref 2.7–4.5)
PLATELET # BLD AUTO: 260 K/UL (ref 150–450)
PMV BLD AUTO: 10.8 FL (ref 9.2–12.9)
POTASSIUM SERPL-SCNC: 3.9 MMOL/L (ref 3.5–5.1)
PROT SERPL-MCNC: 7.8 G/DL (ref 6–8.4)
RBC # BLD AUTO: 6.06 M/UL (ref 4.6–6.2)
SODIUM SERPL-SCNC: 139 MMOL/L (ref 136–145)
WBC # BLD AUTO: 12.22 K/UL (ref 3.9–12.7)

## 2024-05-04 PROCEDURE — 80053 COMPREHEN METABOLIC PANEL: CPT | Performed by: STUDENT IN AN ORGANIZED HEALTH CARE EDUCATION/TRAINING PROGRAM

## 2024-05-04 PROCEDURE — 25000003 PHARM REV CODE 250: Performed by: STUDENT IN AN ORGANIZED HEALTH CARE EDUCATION/TRAINING PROGRAM

## 2024-05-04 PROCEDURE — 83735 ASSAY OF MAGNESIUM: CPT | Performed by: STUDENT IN AN ORGANIZED HEALTH CARE EDUCATION/TRAINING PROGRAM

## 2024-05-04 PROCEDURE — 63600175 PHARM REV CODE 636 W HCPCS: Performed by: STUDENT IN AN ORGANIZED HEALTH CARE EDUCATION/TRAINING PROGRAM

## 2024-05-04 PROCEDURE — 20600001 HC STEP DOWN PRIVATE ROOM

## 2024-05-04 PROCEDURE — 99233 SBSQ HOSP IP/OBS HIGH 50: CPT | Mod: ,,, | Performed by: INTERNAL MEDICINE

## 2024-05-04 PROCEDURE — 36415 COLL VENOUS BLD VENIPUNCTURE: CPT | Performed by: STUDENT IN AN ORGANIZED HEALTH CARE EDUCATION/TRAINING PROGRAM

## 2024-05-04 PROCEDURE — 85730 THROMBOPLASTIN TIME PARTIAL: CPT | Performed by: INTERNAL MEDICINE

## 2024-05-04 PROCEDURE — 36415 COLL VENOUS BLD VENIPUNCTURE: CPT | Performed by: INTERNAL MEDICINE

## 2024-05-04 PROCEDURE — 84100 ASSAY OF PHOSPHORUS: CPT | Performed by: STUDENT IN AN ORGANIZED HEALTH CARE EDUCATION/TRAINING PROGRAM

## 2024-05-04 PROCEDURE — 85025 COMPLETE CBC W/AUTO DIFF WBC: CPT | Performed by: STUDENT IN AN ORGANIZED HEALTH CARE EDUCATION/TRAINING PROGRAM

## 2024-05-04 RX ORDER — TRAMADOL HYDROCHLORIDE 50 MG/1
50 TABLET ORAL ONCE
Status: COMPLETED | OUTPATIENT
Start: 2024-05-04 | End: 2024-05-04

## 2024-05-04 RX ADMIN — FUROSEMIDE 10 MG/HR: 10 INJECTION, SOLUTION INTRAMUSCULAR; INTRAVENOUS at 10:05

## 2024-05-04 RX ADMIN — TRAMADOL HYDROCHLORIDE 50 MG: 50 TABLET, COATED ORAL at 08:05

## 2024-05-04 RX ADMIN — HYDRALAZINE HYDROCHLORIDE 25 MG: 25 TABLET, FILM COATED ORAL at 08:05

## 2024-05-04 RX ADMIN — AMLODIPINE BESYLATE 10 MG: 10 TABLET ORAL at 08:05

## 2024-05-04 RX ADMIN — ASPIRIN 81 MG CHEWABLE TABLET 81 MG: 81 TABLET CHEWABLE at 08:05

## 2024-05-04 RX ADMIN — CARVEDILOL 3.12 MG: 3.12 TABLET, FILM COATED ORAL at 08:05

## 2024-05-04 RX ADMIN — ATORVASTATIN CALCIUM 80 MG: 40 TABLET, FILM COATED ORAL at 08:05

## 2024-05-04 RX ADMIN — HEPARIN SODIUM AND DEXTROSE 18 UNITS/KG/HR: 10000; 5 INJECTION INTRAVENOUS at 12:05

## 2024-05-04 RX ADMIN — HEPARIN SODIUM AND DEXTROSE 18 UNITS/KG/HR: 10000; 5 INJECTION INTRAVENOUS at 11:05

## 2024-05-04 NOTE — ASSESSMENT & PLAN NOTE
- Dramatic drop in blood pressure and rise in creatine after restarting home BP meds medications  - He had previously reported taking all medications; however, suspect non compliance given his response to restarting these meds including entresto    - Holding entresto   - Decreased coreg given ADHF  - Monitor kidney function  - Avoid nephrotoxic medications

## 2024-05-04 NOTE — ASSESSMENT & PLAN NOTE
1. 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   - epicardial left ventricular pacing wires in place.   3. 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 Mild pulmonary artery hypertension noted in the past.   5. Trace aortic insufficiency and mild aortic root enlargement  6. Long history of decreased LV function - possibly exacerbated by high afterload.   - mildly improved with ejection fraction around 40-45% on echocardiogram 2023  7. Hypertension - poor compliance in the past with medications, still an issue  Strong family history of hypertension.

## 2024-05-04 NOTE — SUBJECTIVE & OBJECTIVE
Interval History: ROSEMARIE LIMA  Renal function improving Cr 1.5 this morning.    ROS    Constitutional: Negative.   HENT: Negative.     Cardiovascular:  . Negative for chest pain, claudication, cyanosis, dyspnea on exertion, irregular heartbeat, leg swelling, near-syncope, orthopnea, palpitations, paroxysmal nocturnal dyspnea and syncope.   Respiratory: Negative.     Endocrine: Negative.    Musculoskeletal: Negative.    Gastrointestinal: Negative.    Genitourinary: Negative.    Neurological: Negative.    Objective:     Vital Signs (Most Recent):  Temp: 97.9 °F (36.6 °C) (05/04/24 0804)  Pulse: 87 (05/04/24 0804)  Resp: 18 (05/04/24 0804)  BP: 124/70 (05/04/24 0804)  SpO2: 95 % (05/04/24 0804) Vital Signs (24h Range):  Temp:  [97.5 °F (36.4 °C)-98.9 °F (37.2 °C)] 97.9 °F (36.6 °C)  Pulse:  [82-95] 87  Resp:  [6-31] 18  SpO2:  [95 %-97 %] 95 %  BP: ()/(51-81) 124/70     Weight: 88 kg (194 lb 0.1 oz)  Body mass index is 31.31 kg/m².     SpO2: 95 %         Intake/Output Summary (Last 24 hours) at 5/4/2024 0822  Last data filed at 5/4/2024 0358  Gross per 24 hour   Intake 335.42 ml   Output 1475 ml   Net -1139.58 ml       Lines/Drains/Airways       Peripheral Intravenous Line  Duration                  Peripheral IV - Single Lumen 04/30/24 1903 20 G Right Antecubital 3 days         Peripheral IV - Single Lumen 04/30/24 2218 20 G Left Antecubital 3 days         Peripheral IV - Single Lumen 05/02/24 0642 20 G Left;Posterior Hand 2 days                       Physical Exam     Vitals and nursing note reviewed.   Constitutional:       General: He is not in acute distress.     Appearance: Normal appearance. He is normal weight. He is not ill-appearing or diaphoretic.   HENT:      Head: Normocephalic and atraumatic.   Eyes:      Pupils: Pupils are equal, round, and reactive to light.   Cardiovascular:      Rate and Rhythm: Normal rate and regular rhythm.      Pulses: Normal pulses.      Heart sounds: Normal heart sounds.    Pulmonary:      Effort: Pulmonary effort is normal.      Breath sounds: Normal breath sounds.   Abdominal:      General: Abdomen is flat. Bowel sounds are normal. There is no distension.      Palpations: Abdomen is soft. There is no mass.      Tenderness: There is no abdominal tenderness.   Musculoskeletal:         General: Normal range of motion.   Skin:     General: Skin is warm.      Capillary Refill: Capillary refill takes less than 2 seconds.   Neurological:      General: No focal deficit present.      Mental Status: He is alert and oriented to person, place, and time.     Significant Labs: All pertinent lab results from the last 24 hours have been reviewed.    Significant Imaging:  All pertinent imaging results from the last 24 hours have been reviewed.

## 2024-05-04 NOTE — SUBJECTIVE & OBJECTIVE
Interval History: No acute issues overnight. //72 with a HR , In 1.076L, Out 1.875L, net - 0.799L. Morning labs show Hb 16.6 and Cr 1.5.    Review of Systems   Constitutional: Negative.   HENT: Negative.     Cardiovascular:  Negative for chest pain, claudication, cyanosis, dyspnea on exertion, irregular heartbeat, leg swelling, near-syncope, orthopnea, palpitations, paroxysmal nocturnal dyspnea and syncope.   Respiratory: Negative.     Endocrine: Negative.    Musculoskeletal: Negative.    Gastrointestinal: Negative.    Genitourinary: Negative.    Neurological: Negative.      Objective:     Vital Signs (Most Recent):  Temp: 97.8 °F (36.6 °C) (05/05/24 0742)  Pulse: 85 (05/05/24 0742)  Resp: 14 (05/05/24 0742)  BP: 134/64 (05/05/24 0742)  SpO2: 97 % (05/05/24 0742) Vital Signs (24h Range):  Temp:  [97.2 °F (36.2 °C)-98.2 °F (36.8 °C)] 97.8 °F (36.6 °C)  Pulse:  [] 85  Resp:  [14-18] 14  SpO2:  [95 %-98 %] 97 %  BP: (117-134)/(64-73) 134/64     Weight: 88 kg (194 lb 0.1 oz)  Body mass index is 31.31 kg/m².     SpO2: 97 %         Intake/Output Summary (Last 24 hours) at 5/5/2024 0836  Last data filed at 5/5/2024 0640  Gross per 24 hour   Intake 1316 ml   Output 2625 ml   Net -1309 ml       Lines/Drains/Airways       Peripheral Intravenous Line  Duration                  Peripheral IV - Single Lumen 04/30/24 1903 20 G Right Antecubital 4 days         Peripheral IV - Single Lumen 04/30/24 2218 20 G Left Antecubital 4 days                  Telemetry: NSR     Physical Exam  Vitals and nursing note reviewed.   Constitutional:       General: He is not in acute distress.     Appearance: Normal appearance. He is normal weight. He is not ill-appearing or diaphoretic.   HENT:      Head: Normocephalic and atraumatic.   Eyes:      Pupils: Pupils are equal, round, and reactive to light.   Cardiovascular:      Rate and Rhythm: Normal rate and regular rhythm.      Pulses: Normal pulses.      Heart sounds:  "Normal heart sounds.   Pulmonary:      Effort: Pulmonary effort is normal.      Breath sounds: Normal breath sounds.   Abdominal:      General: Abdomen is flat. Bowel sounds are normal. There is no distension.      Palpations: Abdomen is soft. There is no mass.      Tenderness: There is no abdominal tenderness.   Musculoskeletal:         General: Normal range of motion.   Skin:     General: Skin is warm.      Capillary Refill: Capillary refill takes less than 2 seconds.   Neurological:      General: No focal deficit present.      Mental Status: He is alert and oriented to person, place, and time.            Significant Labs:     Recent Labs   Lab 05/03/24  0508 05/04/24  0329 05/05/24  0649   WBC 11.93 12.22 11.98   HGB 16.2 16.7 16.6   HCT 51.1 53.6 52.7    260 318       Recent Labs   Lab 05/03/24  0508 05/03/24  1659 05/04/24  0329 05/05/24  0649   * 137 139 137   K 3.8 4.2 3.9 3.5    99 100 96   CO2 22* 27 26 29   BUN 32* 32* 29* 30*   CREATININE 1.8* 1.7* 1.5* 1.5*   CALCIUM 9.4 9.8 10.2 10.5   PHOS 5.8*  --  4.2 5.1*       Recent Labs   Lab 05/03/24  0508 05/04/24  0329 05/05/24  0649   ALKPHOS 85 83 86   BILITOT 0.5 0.7 0.9   PROT 7.1 7.8 8.1   ALT 13 16 35   AST 22 25 53*       Recent Labs   Lab 04/30/24  2106   CHOL 158   TRIG 84   HDL 37*     Lab Results   Component Value Date    CHOL 158 04/30/2024    HDL 37 (L) 04/30/2024    LDLCALC 104.2 04/30/2024    TRIG 84 04/30/2024       Recent Labs   Lab 05/02/24  1743 05/02/24  2307 05/03/24  0508   TROPONINI 3.052* 3.055* 3.205*       No results found for: "HGBA1C"    Lab Results   Component Value Date     (H) 04/30/2024    BNP 79 03/23/2023     (H) 07/07/2022     Significant Imaging:     Renal US 05/02/2024    There is insignificant stenosis (0-59%) in the Right Renal Artery.    Elevated right renal resistive index suggestive of intrinsic kidney disease.    Right kidney 10.00 cm.    There is insignificant stenosis (0-59%) in the " Left Renal Artery.    Elevated left renal resistive index suggestive of intrinsic kidney disease.    Left kidney 10.10 cm.    This was a technically difficult study. This study was limited due to excessive bowel gas.    TTE 05/01/2024  Technically difficult acoustic windows.  Qualitative impression of at least moderately dilated right atrium  No obvious signs of previously reported dilation of the tricuspid annulus.  There is trivial to mild tricuspid insufficiency.  Mild right ventricle hypertrophy with at least moderate dilation.  Qualitative impression of moderate to severely reduced systolic function of the inlet and apical segments of the right ventricle with large outflow tract patch.  Right ventricular pressure estimated 16 mmHg above right atrial pressure from reasonably well defined TR doppler profile.  Echodensity consistent with patch repair of ventricular septal defect and malalignment of the ventricular septum with no residual shunt demonstrated.  Images consistent with Anne implant in pulmonary homograft with peak velocity <1.7 m/sec and mean <7 mm Hg.  No significant pulmonary insufficiency.  2D imaging demonstrates well-developed confluence pulmonary branches.  The left atrium appears normal in size with walls not well defined for measurement.  Trivial mitral valve insufficiency.  Qualitative impression of mild concentric left ventricular hypertrophy.  Flattened septal motion with decreased movement of the LV free wall, SF = 16% and EF qualitatively estimated 25 -30% from off axis apical views.  Trivial aortic valve insufficiency.  Aortic dimensions:  Sinuses of Valsalva = 38 mm.  ST junction              = 33 mm.  Ascending aorta      = 38 mm.  Normal size aortic arch with no evidence of coarctation.  No pericardial effusion.

## 2024-05-04 NOTE — ASSESSMENT & PLAN NOTE
#HFrEF 25-30%, NYHA I, ACC/AHA class C:  - Etiology: Likely structural Heart Disease  - Hemodynamic profile: Perfused and hypervolemic  - Difficult compliance as per outpatient cardiology notes, probably taking meds only once a day, which was evidenced by improvement in BP with adequate doses of medications  - Home GDMT: Carvedilol 25mg bid, Entresto 97-103mg bid, spironolactone held when entresto was initiated    - Volume status optimization  - Furosemide 10mg/h, will repeat CXR and decide on switch to IV pushes dosing  - Daily monitoring: Standing weights, Strict I/O, Na restriction (<2g/day), Keep K>4 and Mg>2  - GDMT: Continue Carvedilol 3.125mg bid  - Holding entresto due to hypotension  - Not requiring vasodilator or inotropic support for now

## 2024-05-04 NOTE — ASSESSMENT & PLAN NOTE
- Poor compliance in the past with medications as per cardiologist note  - Strong family history of hypertension.  - Outpatient on amlodipine 10mg, carvedilol 25mg bid, hydralazine 50mg bid and on entresto 97/103mg for HF       - Continue hydralazine 25mg bid  - Continue carvedilol 3.125mg bid

## 2024-05-04 NOTE — ASSESSMENT & PLAN NOTE
NSTEMI - ISAI 3 (13%), WING 64 (1.2%)  - Young patient with congenital heart disease, no major atherosclerotic risk factors  - Atypical chest pain (burning and tightness on right side of the chest), EKG shows left sided ST-T wave changes possible for ischemia, and myocardial injury is evidenced by trop of 2.121 and 2.104 (at OSH 1.85). Nitro sublingual with mild improvement of symptoms    - Continue Telemetry to monitor for ventricular arrhythmias  - ACS protocol: Continue ASA 81mg qd, heparin gtt, atorvastatin 80mg qhs  - GDMT: Carvedilol  - Anti-ischemic medications: Nitrates sublingual and morphine PRN  - Possible PCI on Monday.pending improvement of renal function

## 2024-05-04 NOTE — PLAN OF CARE
AAOX4,VSS,O2 sats>97% on RA..Plan of care discussed with patient.Patient has no complaints of pain/SOB. Discussed medications and care. Patient has no questions at this time.Pt visualised and stable.Call light within reach.Pt resting,bed at lowest position.

## 2024-05-04 NOTE — PROGRESS NOTES
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 Echo showed normal daisy valve and EF of 30%. PCI after renal function improves.    Interval History: ROSEMARIE LIMA  Renal function improving Cr 1.5 this morning.    ROS    Constitutional: Negative.   HENT: Negative.     Cardiovascular:  . Negative for chest pain, claudication, cyanosis, dyspnea on exertion, irregular heartbeat, leg swelling, near-syncope, orthopnea, palpitations, paroxysmal nocturnal dyspnea and syncope.   Respiratory: Negative.     Endocrine: Negative.    Musculoskeletal: Negative.    Gastrointestinal: Negative.    Genitourinary: Negative.    Neurological: Negative.    Objective:     Vital Signs (Most Recent):  Temp: 97.9 °F (36.6 °C) (05/04/24 0804)  Pulse: 87 (05/04/24 0804)  Resp: 18 (05/04/24 0804)  BP: 124/70 (05/04/24 0804)  SpO2: 95 % (05/04/24 0804) Vital Signs (24h Range):  Temp:  [97.5 °F (36.4 °C)-98.9 °F (37.2 °C)] 97.9 °F (36.6 °C)  Pulse:  [82-95] 87  Resp:  [6-31] 18  SpO2:  [95 %-97 %] 95 %  BP: ()/(51-81) 124/70     Weight: 88 kg (194 lb 0.1 oz)  Body mass index is 31.31 kg/m².     SpO2: 95 %         Intake/Output Summary (Last 24 hours) at 5/4/2024 0822  Last data filed at 5/4/2024 0358  Gross per 24 hour   Intake 335.42 ml   Output 1475 ml   Net -1139.58 ml       Lines/Drains/Airways       Peripheral Intravenous Line   Duration                  Peripheral IV - Single Lumen 04/30/24 1903 20 G Right Antecubital 3 days         Peripheral IV - Single Lumen 04/30/24 2218 20 G Left Antecubital 3 days         Peripheral IV - Single Lumen 05/02/24 0642 20 G Left;Posterior Hand 2 days                       Physical Exam     Vitals and nursing note reviewed.   Constitutional:       General: He is not in acute distress.     Appearance: Normal appearance. He is normal weight. He is not ill-appearing or diaphoretic.   HENT:      Head: Normocephalic and atraumatic.   Eyes:      Pupils: Pupils are equal, round, and reactive to light.   Cardiovascular:      Rate and Rhythm: Normal rate and regular rhythm.      Pulses: Normal pulses.      Heart sounds: Normal heart sounds.   Pulmonary:      Effort: Pulmonary effort is normal.      Breath sounds: Normal breath sounds.   Abdominal:      General: Abdomen is flat. Bowel sounds are normal. There is no distension.      Palpations: Abdomen is soft. There is no mass.      Tenderness: There is no abdominal tenderness.   Musculoskeletal:         General: Normal range of motion.   Skin:     General: Skin is warm.      Capillary Refill: Capillary refill takes less than 2 seconds.   Neurological:      General: No focal deficit present.      Mental Status: He is alert and oriented to person, place, and time.     Significant Labs: All pertinent lab results from the last 24 hours have been reviewed.    Significant Imaging:  All pertinent imaging results from the last 24 hours have been reviewed.     Assessment and Plan:         * NSTEMI (non-ST elevated myocardial infarction)  NSTEMI - ISAI 3 (13%), WING 64 (1.2%)  - Risk factors and established atherosclerotic disease: Young patient with congenital heart disease, no major atherosclerotic risk factors  - Diagnosis - Atypical chest pain (burning and tightness on right side of the chest), EKG shows left sided ST-T wave changes possible for ischemia, and  "myocardial injury is evidenced by trop of 2.121 and 2.104 (at OSH 1.85)  - Nitro sublingual with mild improvement of symptoms  - Due to nonspecific symptoms, and burning in nature, with some complain of "acid" in the back of his throat, will attempt GI cocktail    - Admit to CICU  - Follow up studies - serial troponin  - Ordered TTE  - Telemetry to monitor for ventricular arrhythmias  - ACS protocol: Continue ASA 81mg qd, heparin gtt, atorvastatin 80mg qhs  - GDMT: Carvedilol  - Anti-ischemic medications: Nitrates sublingual and morphine PRN  - Possible PCI on Monday.    VINOD (acute kidney injury)  Dramatic drop in blood pressure and rise in creatine after restarting home BP meds medications  He had previously reported taking all medications; however, suspect non compliance given his response to restarting these meds including entresto  Holding entresto   Decreased coreg given ADHF    Adult congenital heart disease  Summary of Diagnosis:    1. 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   - epicardial left ventricular pacing wires in place.   3. 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 Mild pulmonary artery hypertension noted in the past.   5. Trace aortic insufficiency and mild aortic root enlargement  6. Long history of decreased LV function - possibly exacerbated by high afterload.   - mildly improved with ejection fraction around 40-45% on echocardiogram 2023  7. Hypertension - poor compliance in the past with medications, still an issue  Strong family history of hypertension.    Discussion:    last seen by Dr. Corona July 2023.  An echocardiogram in March 2023 looked good although his ejection fraction was around 40-45%, actually improved compared to previous studies.  Blood pressure was again significantly " elevated, and it was clear that his compliance with medications was suboptimal.  The plan was to see him back in 6 months, and stressed the importance of medication compliance  Called by an ER in [place].  Patient presented Tuesday with chest pain that started Monday.  Troponin was elevated at 1.85 with their upper limit of normal 0.12.  BNP was significantly elevated 827.  Our most recent BNP a year ago was normal although he has had mild elevation in the past.  Patient was given aspirin and sublingual nitroglycerin, and chest pain improved.  EKG, by report, unchanged with right bundle branch block, left axis deviation.  Discussed with Dr. Beatris Thompson (HTS service) and with Dr. Mckeon in Interventional Cardiology.  Recommendation was for transfer to our emergency room where the patient can be evaluated by Interventional Cardiology to see if we think there is a need for emergent catheterization given chest pain and elevated troponin.  If not, can be admitted to the HTS service for further evaluation of heart failure.  No history of fever, and by report CBC does not suggest acute infection, but he is definitely at risk for endocarditis given his Anne valve in the pulmonary position.  Echo on Wednesday with markedly worsened EF compared to last echo.  Repeat echo Thursday afternoon to better assess the PV, which is functioning very well with absolutely no evidence of worsening stenosis or endocarditis.   Agree with plan to assess renal arteries given hypertension, recent bump in creatinine when he was more hypotensive on medical management.    Also agree with likely plan for coronary angiography once creatinine has normalized. LHC will likely be on Monday.    HFrEF (heart failure with reduced ejection fraction)  #HFrEF 40%, NYHA I, ACC/AHA class B/C:  - Etiology: Likely structural Heart Disease   - Hemodynamic profile: Perfused and appears euvolemic but elevated BNP (on Entresto) and CXR with prominence  of the pulmonary vascular with bilateral pattern of pulmonary infiltrates  - Difficult compliance as per outpatient cardiology notes, probably taking meds only once a day  - Home GDMT: Carvedilol 25mg bid, Entresto 97-103mg bid, spironolactone held when entresto was initiated    - Ordered NT-ProBNP  - Repeat TTE  - Volume status optimization  - Furosemide 80mg once given, evaluate need of additional dosing  - Daily monitoring: Standing weights, Strict I/O, Na restriction (<2g/day), Keep K>4 and Mg>2  - GDMT: Continue Valsartan/Sacubitril (97/103mg bid), Carvedilol 25mg bid  - Not requiring vasodilator or inotropic support for now    Essential hypertension  - Poor compliance in the past with medications as per cardiologist note  - Strong family history of hypertension.  - Outpatient on amlodipine 10mg, carvedilol 25mg bid, hydralazine 50mg bid and on entresto 97/103mg for HF       - Continue with outpatient medications        VTE Risk Mitigation (From admission, onward)           Ordered     IP VTE HIGH RISK PATIENT  Once         04/30/24 2355     Place sequential compression device  Until discontinued         04/30/24 2355     heparin 25,000 units in dextrose 5% (100 units/ml) IV bolus from bag LOW INTENSITY nomogram - [facility]  As needed (PRN)        Question:  Heparin Infusion Adjustment (DO NOT MODIFY ANSWER)  Answer:  [email or web address]\Images\Pharmacy\HeparinInfusions\heparin LOW INTENSITY nomogram for [facility] [id number]    04/30/24 2050     heparin 25,000 units in dextrose 5% (100 units/ml) IV bolus from bag LOW INTENSITY nomogram - [facility]  As needed (PRN)        Question:  Heparin Infusion Adjustment (DO NOT MODIFY ANSWER)  Answer:  [email or web address]\Images\Pharmacy\HeparinInfusions\heparin LOW INTENSITY nomogram for [facility] [id number]    04/30/24 2050     heparin 25,000 units in dextrose 5% 250 mL (100 units/mL) infusion LOW INTENSITY nomogram - [facility]  Continuous        Question:  Begin at (units/kg/hr)  Answer:  12     04/30/24 2050                    Areli Nicole MD  Cardiology  [facility] - [facility]iology Step[facility]

## 2024-05-04 NOTE — PLAN OF CARE
Patient alert and oriented x4, denies pain, no s/s of distress, VS WNL, family at side, safety instructions given, plan of care discussed with patient and family, comfort measures given, will continue to monitor.    Problem: Adult Inpatient Plan of Care  Goal: Absence of Hospital-Acquired Illness or Injury  Outcome: Not Progressing  Goal: Optimal Comfort and Wellbeing  Outcome: Not Progressing  Goal: Readiness for Transition of Care  Outcome: Not Progressing     Problem: Acute Kidney Injury/Impairment  Goal: Fluid and Electrolyte Balance  Outcome: Not Progressing

## 2024-05-04 NOTE — ASSESSMENT & PLAN NOTE
Summary of Diagnosis:    1. 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   - epicardial left ventricular pacing wires in place.   3. 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 Mild pulmonary artery hypertension noted in the past.   5. Trace aortic insufficiency and mild aortic root enlargement  6. Long history of decreased LV function - possibly exacerbated by high afterload.   - mildly improved with ejection fraction around 40-45% on echocardiogram 2023  7. Hypertension - poor compliance in the past with medications, still an issue  Strong family history of hypertension.    Discussion:    last seen by Dr. Corona July 2023.  An echocardiogram in March 2023 looked good although his ejection fraction was around 40-45%, actually improved compared to previous studies.  Blood pressure was again significantly elevated, and it was clear that his compliance with medications was suboptimal.  The plan was to see him back in 6 months, and stressed the importance of medication compliance  Called by an ER in [place].  Patient presented Tuesday with chest pain that started Monday.  Troponin was elevated at 1.85 with their upper limit of normal 0.12.  BNP was significantly elevated 827.  Our most recent BNP a year ago was normal although he has had mild elevation in the past.  Patient was given aspirin and sublingual nitroglycerin, and chest pain improved.  EKG, by report, unchanged with right bundle branch block, left axis deviation.  Discussed with Dr. Beatris Thompson (HTS service) and with Dr. Mckeon in Interventional Cardiology.  Recommendation was for transfer to our emergency room where the patient can be evaluated by Interventional Cardiology to see if we think there is a need for emergent catheterization given chest  pain and elevated troponin.  If not, can be admitted to the \[facility]\"" service for further evaluation of heart failure.  No history of fever, and by report CBC does not suggest acute infection, but he is definitely at risk for endocarditis given his Anne valve in the pulmonary position.  Echo on Wednesday with markedly worsened EF compared to last echo.  Repeat echo Thursday afternoon to better assess the PV, which is functioning very well with absolutely no evidence of worsening stenosis or endocarditis.   Agree with plan to assess renal arteries given hypertension, recent bump in creatinine when he was more hypotensive on medical management.    Also agree with likely plan for coronary angiography once creatinine has normalized. LHC will likely be on Monday.

## 2024-05-05 LAB
ALBUMIN SERPL BCP-MCNC: 3.9 G/DL (ref 3.5–5.2)
ALP SERPL-CCNC: 86 U/L (ref 55–135)
ALT SERPL W/O P-5'-P-CCNC: 35 U/L (ref 10–44)
ANION GAP SERPL CALC-SCNC: 12 MMOL/L (ref 8–16)
ANION GAP SERPL CALC-SCNC: 17 MMOL/L (ref 8–16)
APTT PPP: 58.7 SEC (ref 21–32)
AST SERPL-CCNC: 53 U/L (ref 10–40)
BASOPHILS # BLD AUTO: 0.1 K/UL (ref 0–0.2)
BASOPHILS NFR BLD: 0.8 % (ref 0–1.9)
BILIRUB SERPL-MCNC: 0.9 MG/DL (ref 0.1–1)
BUN SERPL-MCNC: 30 MG/DL (ref 6–20)
BUN SERPL-MCNC: 33 MG/DL (ref 6–20)
CALCIUM SERPL-MCNC: 10.5 MG/DL (ref 8.7–10.5)
CALCIUM SERPL-MCNC: 10.9 MG/DL (ref 8.7–10.5)
CHLORIDE SERPL-SCNC: 93 MMOL/L (ref 95–110)
CHLORIDE SERPL-SCNC: 96 MMOL/L (ref 95–110)
CO2 SERPL-SCNC: 27 MMOL/L (ref 23–29)
CO2 SERPL-SCNC: 29 MMOL/L (ref 23–29)
CREAT SERPL-MCNC: 1.5 MG/DL (ref 0.5–1.4)
CREAT SERPL-MCNC: 1.6 MG/DL (ref 0.5–1.4)
DIFFERENTIAL METHOD BLD: ABNORMAL
EOSINOPHIL # BLD AUTO: 1 K/UL (ref 0–0.5)
EOSINOPHIL NFR BLD: 8.2 % (ref 0–8)
ERYTHROCYTE [DISTWIDTH] IN BLOOD BY AUTOMATED COUNT: 13.8 % (ref 11.5–14.5)
EST. GFR  (NO RACE VARIABLE): 57.6 ML/MIN/1.73 M^2
EST. GFR  (NO RACE VARIABLE): >60 ML/MIN/1.73 M^2
GLUCOSE SERPL-MCNC: 102 MG/DL (ref 70–110)
GLUCOSE SERPL-MCNC: 153 MG/DL (ref 70–110)
HCT VFR BLD AUTO: 52.7 % (ref 40–54)
HGB BLD-MCNC: 16.6 G/DL (ref 14–18)
IMM GRANULOCYTES # BLD AUTO: 0.06 K/UL (ref 0–0.04)
IMM GRANULOCYTES NFR BLD AUTO: 0.5 % (ref 0–0.5)
LYMPHOCYTES # BLD AUTO: 2.2 K/UL (ref 1–4.8)
LYMPHOCYTES NFR BLD: 18.2 % (ref 18–48)
MAGNESIUM SERPL-MCNC: 2.1 MG/DL (ref 1.6–2.6)
MAGNESIUM SERPL-MCNC: 2.3 MG/DL (ref 1.6–2.6)
MCH RBC QN AUTO: 27.5 PG (ref 27–31)
MCHC RBC AUTO-ENTMCNC: 31.5 G/DL (ref 32–36)
MCV RBC AUTO: 87 FL (ref 82–98)
MONOCYTES # BLD AUTO: 1.4 K/UL (ref 0.3–1)
MONOCYTES NFR BLD: 11.8 % (ref 4–15)
NEUTROPHILS # BLD AUTO: 7.3 K/UL (ref 1.8–7.7)
NEUTROPHILS NFR BLD: 60.5 % (ref 38–73)
NRBC BLD-RTO: 0 /100 WBC
PHOSPHATE SERPL-MCNC: 5.1 MG/DL (ref 2.7–4.5)
PLATELET # BLD AUTO: 318 K/UL (ref 150–450)
PMV BLD AUTO: 10.9 FL (ref 9.2–12.9)
POTASSIUM SERPL-SCNC: 3.5 MMOL/L (ref 3.5–5.1)
POTASSIUM SERPL-SCNC: 4.1 MMOL/L (ref 3.5–5.1)
PROT SERPL-MCNC: 8.1 G/DL (ref 6–8.4)
RBC # BLD AUTO: 6.03 M/UL (ref 4.6–6.2)
SODIUM SERPL-SCNC: 137 MMOL/L (ref 136–145)
SODIUM SERPL-SCNC: 137 MMOL/L (ref 136–145)
WBC # BLD AUTO: 11.98 K/UL (ref 3.9–12.7)

## 2024-05-05 PROCEDURE — 85025 COMPLETE CBC W/AUTO DIFF WBC: CPT | Performed by: STUDENT IN AN ORGANIZED HEALTH CARE EDUCATION/TRAINING PROGRAM

## 2024-05-05 PROCEDURE — 20600001 HC STEP DOWN PRIVATE ROOM

## 2024-05-05 PROCEDURE — 36415 COLL VENOUS BLD VENIPUNCTURE: CPT | Performed by: INTERNAL MEDICINE

## 2024-05-05 PROCEDURE — 25000003 PHARM REV CODE 250: Performed by: STUDENT IN AN ORGANIZED HEALTH CARE EDUCATION/TRAINING PROGRAM

## 2024-05-05 PROCEDURE — 80053 COMPREHEN METABOLIC PANEL: CPT | Performed by: STUDENT IN AN ORGANIZED HEALTH CARE EDUCATION/TRAINING PROGRAM

## 2024-05-05 PROCEDURE — 63600175 PHARM REV CODE 636 W HCPCS: Performed by: STUDENT IN AN ORGANIZED HEALTH CARE EDUCATION/TRAINING PROGRAM

## 2024-05-05 PROCEDURE — 84100 ASSAY OF PHOSPHORUS: CPT | Performed by: STUDENT IN AN ORGANIZED HEALTH CARE EDUCATION/TRAINING PROGRAM

## 2024-05-05 PROCEDURE — 80048 BASIC METABOLIC PNL TOTAL CA: CPT | Mod: XB | Performed by: INTERNAL MEDICINE

## 2024-05-05 PROCEDURE — 83735 ASSAY OF MAGNESIUM: CPT | Mod: 91 | Performed by: INTERNAL MEDICINE

## 2024-05-05 PROCEDURE — 36415 COLL VENOUS BLD VENIPUNCTURE: CPT | Performed by: STUDENT IN AN ORGANIZED HEALTH CARE EDUCATION/TRAINING PROGRAM

## 2024-05-05 PROCEDURE — 83735 ASSAY OF MAGNESIUM: CPT | Performed by: STUDENT IN AN ORGANIZED HEALTH CARE EDUCATION/TRAINING PROGRAM

## 2024-05-05 PROCEDURE — 85730 THROMBOPLASTIN TIME PARTIAL: CPT | Performed by: STUDENT IN AN ORGANIZED HEALTH CARE EDUCATION/TRAINING PROGRAM

## 2024-05-05 PROCEDURE — 99233 SBSQ HOSP IP/OBS HIGH 50: CPT | Mod: ,,, | Performed by: INTERNAL MEDICINE

## 2024-05-05 RX ADMIN — CARVEDILOL 3.12 MG: 3.12 TABLET, FILM COATED ORAL at 09:05

## 2024-05-05 RX ADMIN — HYDRALAZINE HYDROCHLORIDE 25 MG: 25 TABLET, FILM COATED ORAL at 09:05

## 2024-05-05 RX ADMIN — AMLODIPINE BESYLATE 10 MG: 10 TABLET ORAL at 09:05

## 2024-05-05 RX ADMIN — ACETAMINOPHEN 650 MG: 325 TABLET ORAL at 04:05

## 2024-05-05 RX ADMIN — ASPIRIN 81 MG CHEWABLE TABLET 81 MG: 81 TABLET CHEWABLE at 09:05

## 2024-05-05 RX ADMIN — ATORVASTATIN CALCIUM 80 MG: 40 TABLET, FILM COATED ORAL at 09:05

## 2024-05-05 RX ADMIN — HEPARIN SODIUM AND DEXTROSE 18 UNITS/KG/HR: 10000; 5 INJECTION INTRAVENOUS at 07:05

## 2024-05-05 NOTE — PLAN OF CARE
AAOX4,VSS,O2 sats>96% on RA..Plan of care discussed with patient.Patient has no complaints of pain/SOB. Discussed medications and care. Patient has no questions at this time.Pt visualised and stable.Call light within reach.Pt resting,bed at lowest position.

## 2024-05-05 NOTE — PROGRESS NOTES
[facility] - Cardiology Stepdown  Cardiology  Progress Note    Patient Name: Thomas Horvath  MRN: [id number]  Admission Date: 4/30/2024  Hospital Length of Stay: 5 days  Code Status: Full Code   Attending Physician: Anand Keen MD   Primary Care Physician: Sissy Lacy MD  Expected Discharge Date: 5/6/2024  Principal Problem:NSTEMI (non-ST elevated myocardial infarction)    Subjective:     Interval History: No acute issues overnight. //72 with a HR , In 1.076L, Out 1.875L, net - 0.799L. Morning labs show Hb 16.6 and Cr 1.5.    Review of Systems   Constitutional: Negative.   HENT: Negative.     Cardiovascular:  Negative for chest pain, claudication, cyanosis, dyspnea on exertion, irregular heartbeat, leg swelling, near-syncope, orthopnea, palpitations, paroxysmal nocturnal dyspnea and syncope.   Respiratory: Negative.     Endocrine: Negative.    Musculoskeletal: Negative.    Gastrointestinal: Negative.    Genitourinary: Negative.    Neurological: Negative.      Objective:     Vital Signs (Most Recent):  Temp: 97.8 °F (36.6 °C) (05/05/24 0742)  Pulse: 85 (05/05/24 0742)  Resp: 14 (05/05/24 0742)  BP: 134/64 (05/05/24 0742)  SpO2: 97 % (05/05/24 0742) Vital Signs (24h Range):  Temp:  [97.2 °F (36.2 °C)-98.2 °F (36.8 °C)] 97.8 °F (36.6 °C)  Pulse:  [] 85  Resp:  [14-18] 14  SpO2:  [95 %-98 %] 97 %  BP: (117-134)/(64-73) 134/64     Weight: 88 kg (194 lb 0.1 oz)  Body mass index is 31.31 kg/m².     SpO2: 97 %         Intake/Output Summary (Last 24 hours) at 5/5/2024 0836  Last data filed at 5/5/2024 0640  Gross per 24 hour   Intake 1316 ml   Output 2625 ml   Net -1309 ml       Lines/Drains/Airways       Peripheral Intravenous Line  Duration                  Peripheral IV - Single Lumen 04/30/24 1903 20 G Right Antecubital 4 days         Peripheral IV - Single Lumen 04/30/24 2218 20 G Left Antecubital 4 days                  Telemetry: NSR     Physical Exam  Vitals and nursing note  reviewed.   Constitutional:       General: He is not in acute distress.     Appearance: Normal appearance. He is normal weight. He is not ill-appearing or diaphoretic.   HENT:      Head: Normocephalic and atraumatic.   Eyes:      Pupils: Pupils are equal, round, and reactive to light.   Cardiovascular:      Rate and Rhythm: Normal rate and regular rhythm.      Pulses: Normal pulses.      Heart sounds: Normal heart sounds.   Pulmonary:      Effort: Pulmonary effort is normal.      Breath sounds: Normal breath sounds.   Abdominal:      General: Abdomen is flat. Bowel sounds are normal. There is no distension.      Palpations: Abdomen is soft. There is no mass.      Tenderness: There is no abdominal tenderness.   Musculoskeletal:         General: Normal range of motion.   Skin:     General: Skin is warm.      Capillary Refill: Capillary refill takes less than 2 seconds.   Neurological:      General: No focal deficit present.      Mental Status: He is alert and oriented to person, place, and time.            Significant Labs:     Recent Labs   Lab 05/03/24  0508 05/04/24  0329 05/05/24  0649   WBC 11.93 12.22 11.98   HGB 16.2 16.7 16.6   HCT 51.1 53.6 52.7    260 318       Recent Labs   Lab 05/03/24  0508 05/03/24  1659 05/04/24  0329 05/05/24  0649   * 137 139 137   K 3.8 4.2 3.9 3.5    99 100 96   CO2 22* 27 26 29   BUN 32* 32* 29* 30*   CREATININE 1.8* 1.7* 1.5* 1.5*   CALCIUM 9.4 9.8 10.2 10.5   PHOS 5.8*  --  4.2 5.1*       Recent Labs   Lab 05/03/24  0508 05/04/24  0329 05/05/24  0649   ALKPHOS 85 83 86   BILITOT 0.5 0.7 0.9   PROT 7.1 7.8 8.1   ALT 13 16 35   AST 22 25 53*       Recent Labs   Lab 04/30/24  2106   CHOL 158   TRIG 84   HDL 37*     Lab Results   Component Value Date    CHOL 158 04/30/2024    HDL 37 (L) 04/30/2024    LDLCALC 104.2 04/30/2024    TRIG 84 04/30/2024       Recent Labs   Lab 05/02/24  1743 05/02/24  2307 05/03/24  0508   TROPONINI 3.052* 3.055* 3.205*       No results  "found for: "HGBA1C"    Lab Results   Component Value Date     (H) 04/30/2024    BNP 79 03/23/2023     (H) 07/07/2022     Significant Imaging:     Renal US 05/02/2024    There is insignificant stenosis (0-59%) in the Right Renal Artery.    Elevated right renal resistive index suggestive of intrinsic kidney disease.    Right kidney 10.00 cm.    There is insignificant stenosis (0-59%) in the Left Renal Artery.    Elevated left renal resistive index suggestive of intrinsic kidney disease.    Left kidney 10.10 cm.    This was a technically difficult study. This study was limited due to excessive bowel gas.    TTE 05/01/2024  Technically difficult acoustic windows.  Qualitative impression of at least moderately dilated right atrium  No obvious signs of previously reported dilation of the tricuspid annulus.  There is trivial to mild tricuspid insufficiency.  Mild right ventricle hypertrophy with at least moderate dilation.  Qualitative impression of moderate to severely reduced systolic function of the inlet and apical segments of the right ventricle with large outflow tract patch.  Right ventricular pressure estimated 16 mmHg above right atrial pressure from reasonably well defined TR doppler profile.  Echodensity consistent with patch repair of ventricular septal defect and malalignment of the ventricular septum with no residual shunt demonstrated.  Images consistent with Anne implant in pulmonary homograft with peak velocity <1.7 m/sec and mean <7 mm Hg.  No significant pulmonary insufficiency.  2D imaging demonstrates well-developed confluence pulmonary branches.  The left atrium appears normal in size with walls not well defined for measurement.  Trivial mitral valve insufficiency.  Qualitative impression of mild concentric left ventricular hypertrophy.  Flattened septal motion with decreased movement of the LV free wall, SF = 16% and EF qualitatively estimated 25 -30% from off axis apical " views.  Trivial aortic valve insufficiency.  Aortic dimensions:  Sinuses of Valsalva = 38 mm.  ST junction              = 33 mm.  Ascending aorta      = 38 mm.  Normal size aortic arch with no evidence of coarctation.  No pericardial effusion.      Assessment and Plan:     * NSTEMI (non-ST elevated myocardial infarction)  NSTEMI - ISAI 3 (13%), WING 64 (1.2%)  - Young patient with congenital heart disease, no major atherosclerotic risk factors  - Atypical chest pain (burning and tightness on right side of the chest), EKG shows left sided ST-T wave changes possible for ischemia, and myocardial injury is evidenced by trop of 2.121 and 2.104 (at OSH 1.85). Nitro sublingual with mild improvement of symptoms    - Continue Telemetry to monitor for ventricular arrhythmias  - ACS protocol: Continue ASA 81mg qd, heparin gtt, atorvastatin 80mg qhs  - GDMT: Carvedilol  - Anti-ischemic medications: Nitrates sublingual and morphine PRN  - Possible PCI on Monday.pending improvement of renal function    VINOD (acute kidney injury)  - Dramatic drop in blood pressure and rise in creatine after restarting home BP meds medications  - He had previously reported taking all medications; however, suspect non compliance given his response to restarting these meds including entresto    - Holding entresto   - Decreased coreg given ADHF  - Monitor kidney function  - Avoid nephrotoxic medications    Adult congenital heart disease  1. 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   - epicardial left ventricular pacing wires in place.   3. 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 Mild pulmonary artery hypertension noted in the past.   5. Trace aortic insufficiency and mild aortic root enlargement  6. Long history of decreased LV function -  possibly exacerbated by high afterload.   - mildly improved with ejection fraction around 40-45% on echocardiogram 2023  7. Hypertension - poor compliance in the past with medications, still an issue  Strong family history of hypertension.    HFrEF (heart failure with reduced ejection fraction)  #HFrEF 25-30%, NYHA I, ACC/AHA class C:  - Etiology: Likely structural Heart Disease  - Hemodynamic profile: Perfused and hypervolemic  - Difficult compliance as per outpatient cardiology notes, probably taking meds only once a day, which was evidenced by improvement in BP with adequate doses of medications  - Home GDMT: Carvedilol 25mg bid, Entresto 97-103mg bid, spironolactone held when entresto was initiated    - Volume status optimization  - Furosemide 10mg/h, will repeat CXR and decide on switch to IV pushes dosing  - Daily monitoring: Standing weights, Strict I/O, Na restriction (<2g/day), Keep K>4 and Mg>2  - GDMT: Continue Carvedilol 3.125mg bid  - Holding entresto due to hypotension  - Not requiring vasodilator or inotropic support for now    Essential hypertension  - Poor compliance in the past with medications as per cardiologist note  - Strong family history of hypertension.  - Outpatient on amlodipine 10mg, carvedilol 25mg bid, hydralazine 50mg bid and on entresto 97/103mg for HF       - Continue hydralazine 25mg bid  - Continue carvedilol 3.125mg bid        VTE Risk Mitigation (From admission, onward)           Ordered     IP VTE HIGH RISK PATIENT  Once         04/30/24 2355     Place sequential compression device  Until discontinued         04/30/24 2355     heparin 25,000 units in dextrose 5% (100 units/ml) IV bolus from bag LOW INTENSITY nomogram - [facility]  As needed (PRN)        Question:  Heparin Infusion Adjustment (DO NOT MODIFY ANSWER)  Answer:  [email or web address]\epic\Images\Pharmacy\HeparinInfusions\heparin LOW INTENSITY nomogram for [facility] [id number]    04/30/24 2050     heparin 25,000 units in dextrose 5% (100  units/ml) IV bolus from bag LOW INTENSITY nomogram - [facility]  As needed (PRN)        Question:  Heparin Infusion Adjustment (DO NOT MODIFY ANSWER)  Answer:  [email or web address]\epic\Images\Pharmacy\HeparinInfusions\heparin LOW INTENSITY nomogram for [facility] [id number].pdf    04/30/24 2050     heparin 25,000 units in dextrose 5% 250 mL (100 units/mL) infusion LOW INTENSITY nomogram - [facility]  Continuous        Question:  Begin at (units/kg/hr)  Answer:  12    04/30/24 2050                    Kyler San MD  Cardiology  [facility] - Cardiology Stepdown

## 2024-05-05 NOTE — PLAN OF CARE
Patient alert and oriented x4, c/o of headache, MD notified for stronger pain medication per patient request, VS WNL, plan of care discussed with patient, comfort measures given, will continue to monitor.    Problem: Adult Inpatient Plan of Care  Goal: Plan of Care Review  Outcome: Progressing  Goal: Absence of Hospital-Acquired Illness or Injury  Outcome: Progressing  Goal: Optimal Comfort and Wellbeing  Outcome: Progressing  Goal: Readiness for Transition of Care  Outcome: Progressing     Problem: Fall Injury Risk  Goal: Absence of Fall and Fall-Related Injury  Outcome: Progressing     Problem: Acute Kidney Injury/Impairment  Goal: Fluid and Electrolyte Balance  Outcome: Progressing  Goal: Effective Renal Function  Outcome: Progressing

## 2024-05-06 ENCOUNTER — PATIENT MESSAGE (OUTPATIENT)
Dept: CARDIOLOGY | Facility: CLINIC | Age: 34
End: 2024-05-06
Payer: MEDICAID

## 2024-05-06 PROBLEM — E66.9 OBESITY (BMI 30-39.9): Status: ACTIVE | Noted: 2024-05-06

## 2024-05-06 LAB
ABO + RH BLD: NORMAL
ALBUMIN SERPL BCP-MCNC: 4 G/DL (ref 3.5–5.2)
ALDOST SERPL-MCNC: <3 NG/DL
ALDOST/RENIN PLAS-RTO: <0.1 RATIO
ALP SERPL-CCNC: 90 U/L (ref 55–135)
ALT SERPL W/O P-5'-P-CCNC: 60 U/L (ref 10–44)
ANION GAP SERPL CALC-SCNC: 15 MMOL/L (ref 8–16)
APTT PPP: 54.4 SEC (ref 21–32)
AST SERPL-CCNC: 73 U/L (ref 10–40)
BASOPHILS # BLD AUTO: 0.09 K/UL (ref 0–0.2)
BASOPHILS NFR BLD: 0.8 % (ref 0–1.9)
BILIRUB SERPL-MCNC: 1 MG/DL (ref 0.1–1)
BLD GP AB SCN CELLS X3 SERPL QL: NORMAL
BNP SERPL-MCNC: 73 PG/ML (ref 0–99)
BUN SERPL-MCNC: 34 MG/DL (ref 6–20)
CALCIUM SERPL-MCNC: 10.7 MG/DL (ref 8.7–10.5)
CHLORIDE SERPL-SCNC: 95 MMOL/L (ref 95–110)
CO2 SERPL-SCNC: 25 MMOL/L (ref 23–29)
CREAT SERPL-MCNC: 1.5 MG/DL (ref 0.5–1.4)
DIFFERENTIAL METHOD BLD: ABNORMAL
EOSINOPHIL # BLD AUTO: 1.1 K/UL (ref 0–0.5)
EOSINOPHIL NFR BLD: 9.4 % (ref 0–8)
ERYTHROCYTE [DISTWIDTH] IN BLOOD BY AUTOMATED COUNT: 13.6 % (ref 11.5–14.5)
EST. GFR  (NO RACE VARIABLE): >60 ML/MIN/1.73 M^2
GLUCOSE SERPL-MCNC: 104 MG/DL (ref 70–110)
HCT VFR BLD AUTO: 53.3 % (ref 40–54)
HGB BLD-MCNC: 17.1 G/DL (ref 14–18)
IMM GRANULOCYTES # BLD AUTO: 0.04 K/UL (ref 0–0.04)
IMM GRANULOCYTES NFR BLD AUTO: 0.3 % (ref 0–0.5)
LYMPHOCYTES # BLD AUTO: 2 K/UL (ref 1–4.8)
LYMPHOCYTES NFR BLD: 17 % (ref 18–48)
MAGNESIUM SERPL-MCNC: 2.2 MG/DL (ref 1.6–2.6)
MCH RBC QN AUTO: 27.5 PG (ref 27–31)
MCHC RBC AUTO-ENTMCNC: 32.1 G/DL (ref 32–36)
MCV RBC AUTO: 86 FL (ref 82–98)
MONOCYTES # BLD AUTO: 1.2 K/UL (ref 0.3–1)
MONOCYTES NFR BLD: 10.2 % (ref 4–15)
NEUTROPHILS # BLD AUTO: 7.2 K/UL (ref 1.8–7.7)
NEUTROPHILS NFR BLD: 62.3 % (ref 38–73)
NRBC BLD-RTO: 0 /100 WBC
PHOSPHATE SERPL-MCNC: 4.9 MG/DL (ref 2.7–4.5)
PLATELET # BLD AUTO: 303 K/UL (ref 150–450)
PMV BLD AUTO: 10.7 FL (ref 9.2–12.9)
POTASSIUM SERPL-SCNC: 3.8 MMOL/L (ref 3.5–5.1)
PROT SERPL-MCNC: 8.4 G/DL (ref 6–8.4)
RBC # BLD AUTO: 6.21 M/UL (ref 4.6–6.2)
RENIN PLAS-CCNC: 22.8 NG/ML/HR
SODIUM SERPL-SCNC: 135 MMOL/L (ref 136–145)
SPECIMEN OUTDATE: NORMAL
WBC # BLD AUTO: 11.5 K/UL (ref 3.9–12.7)

## 2024-05-06 PROCEDURE — 83735 ASSAY OF MAGNESIUM: CPT | Performed by: STUDENT IN AN ORGANIZED HEALTH CARE EDUCATION/TRAINING PROGRAM

## 2024-05-06 PROCEDURE — 99152 MOD SED SAME PHYS/QHP 5/>YRS: CPT | Mod: ,,, | Performed by: INTERNAL MEDICINE

## 2024-05-06 PROCEDURE — 63600175 PHARM REV CODE 636 W HCPCS: Mod: JZ,JG | Performed by: INTERNAL MEDICINE

## 2024-05-06 PROCEDURE — C1769 GUIDE WIRE: HCPCS | Performed by: INTERNAL MEDICINE

## 2024-05-06 PROCEDURE — 99232 SBSQ HOSP IP/OBS MODERATE 35: CPT | Mod: ,,, | Performed by: PEDIATRICS

## 2024-05-06 PROCEDURE — 99233 SBSQ HOSP IP/OBS HIGH 50: CPT | Mod: 25,,, | Performed by: INTERNAL MEDICINE

## 2024-05-06 PROCEDURE — 80053 COMPREHEN METABOLIC PANEL: CPT | Performed by: STUDENT IN AN ORGANIZED HEALTH CARE EDUCATION/TRAINING PROGRAM

## 2024-05-06 PROCEDURE — 93458 L HRT ARTERY/VENTRICLE ANGIO: CPT | Mod: 26,,, | Performed by: INTERNAL MEDICINE

## 2024-05-06 PROCEDURE — B2111ZZ FLUOROSCOPY OF MULTIPLE CORONARY ARTERIES USING LOW OSMOLAR CONTRAST: ICD-10-PCS | Performed by: INTERNAL MEDICINE

## 2024-05-06 PROCEDURE — 25000003 PHARM REV CODE 250: Performed by: STUDENT IN AN ORGANIZED HEALTH CARE EDUCATION/TRAINING PROGRAM

## 2024-05-06 PROCEDURE — 4A023N7 MEASUREMENT OF CARDIAC SAMPLING AND PRESSURE, LEFT HEART, PERCUTANEOUS APPROACH: ICD-10-PCS | Performed by: INTERNAL MEDICINE

## 2024-05-06 PROCEDURE — 85730 THROMBOPLASTIN TIME PARTIAL: CPT | Performed by: STUDENT IN AN ORGANIZED HEALTH CARE EDUCATION/TRAINING PROGRAM

## 2024-05-06 PROCEDURE — 25000003 PHARM REV CODE 250

## 2024-05-06 PROCEDURE — 93458 L HRT ARTERY/VENTRICLE ANGIO: CPT | Performed by: INTERNAL MEDICINE

## 2024-05-06 PROCEDURE — 36415 COLL VENOUS BLD VENIPUNCTURE: CPT | Performed by: STUDENT IN AN ORGANIZED HEALTH CARE EDUCATION/TRAINING PROGRAM

## 2024-05-06 PROCEDURE — 85025 COMPLETE CBC W/AUTO DIFF WBC: CPT | Performed by: STUDENT IN AN ORGANIZED HEALTH CARE EDUCATION/TRAINING PROGRAM

## 2024-05-06 PROCEDURE — 36415 COLL VENOUS BLD VENIPUNCTURE: CPT

## 2024-05-06 PROCEDURE — 83880 ASSAY OF NATRIURETIC PEPTIDE: CPT

## 2024-05-06 PROCEDURE — 25000003 PHARM REV CODE 250: Performed by: INTERNAL MEDICINE

## 2024-05-06 PROCEDURE — 99152 MOD SED SAME PHYS/QHP 5/>YRS: CPT | Performed by: INTERNAL MEDICINE

## 2024-05-06 PROCEDURE — 86901 BLOOD TYPING SEROLOGIC RH(D): CPT | Performed by: STUDENT IN AN ORGANIZED HEALTH CARE EDUCATION/TRAINING PROGRAM

## 2024-05-06 PROCEDURE — C1894 INTRO/SHEATH, NON-LASER: HCPCS | Performed by: INTERNAL MEDICINE

## 2024-05-06 PROCEDURE — C1887 CATHETER, GUIDING: HCPCS | Performed by: INTERNAL MEDICINE

## 2024-05-06 PROCEDURE — 20600001 HC STEP DOWN PRIVATE ROOM

## 2024-05-06 PROCEDURE — B2151ZZ FLUOROSCOPY OF LEFT HEART USING LOW OSMOLAR CONTRAST: ICD-10-PCS | Performed by: INTERNAL MEDICINE

## 2024-05-06 PROCEDURE — 25500020 PHARM REV CODE 255: Performed by: INTERNAL MEDICINE

## 2024-05-06 PROCEDURE — 84100 ASSAY OF PHOSPHORUS: CPT | Performed by: STUDENT IN AN ORGANIZED HEALTH CARE EDUCATION/TRAINING PROGRAM

## 2024-05-06 RX ORDER — SODIUM CHLORIDE 9 MG/ML
INJECTION, SOLUTION INTRAVENOUS CONTINUOUS
Status: ACTIVE | OUTPATIENT
Start: 2024-05-06 | End: 2024-05-06

## 2024-05-06 RX ORDER — SODIUM CHLORIDE 0.9 % (FLUSH) 0.9 %
10 SYRINGE (ML) INJECTION
Status: DISCONTINUED | OUTPATIENT
Start: 2024-05-06 | End: 2024-05-07 | Stop reason: HOSPADM

## 2024-05-06 RX ORDER — FUROSEMIDE 80 MG/1
80 TABLET ORAL 2 TIMES DAILY
Status: DISCONTINUED | OUTPATIENT
Start: 2024-05-06 | End: 2024-05-07 | Stop reason: HOSPADM

## 2024-05-06 RX ORDER — OXYCODONE HYDROCHLORIDE 5 MG/1
5 TABLET ORAL ONCE
Status: COMPLETED | OUTPATIENT
Start: 2024-05-06 | End: 2024-05-06

## 2024-05-06 RX ORDER — ACETAMINOPHEN 325 MG/1
650 TABLET ORAL EVERY 4 HOURS PRN
Status: DISCONTINUED | OUTPATIENT
Start: 2024-05-06 | End: 2024-05-06

## 2024-05-06 RX ORDER — LIDOCAINE HYDROCHLORIDE 20 MG/ML
INJECTION, SOLUTION EPIDURAL; INFILTRATION; INTRACAUDAL; PERINEURAL
Status: DISCONTINUED | OUTPATIENT
Start: 2024-05-06 | End: 2024-05-06

## 2024-05-06 RX ORDER — FENTANYL CITRATE 50 UG/ML
INJECTION, SOLUTION INTRAMUSCULAR; INTRAVENOUS
Status: DISCONTINUED | OUTPATIENT
Start: 2024-05-06 | End: 2024-05-06

## 2024-05-06 RX ORDER — HEPARIN SOD,PORCINE/0.9 % NACL 1000/500ML
INTRAVENOUS SOLUTION INTRAVENOUS
Status: DISCONTINUED | OUTPATIENT
Start: 2024-05-06 | End: 2024-05-06

## 2024-05-06 RX ORDER — SODIUM CHLORIDE 9 MG/ML
INJECTION, SOLUTION INTRAVENOUS CONTINUOUS
Status: DISCONTINUED | OUTPATIENT
Start: 2024-05-06 | End: 2024-05-06

## 2024-05-06 RX ORDER — MIDAZOLAM HYDROCHLORIDE 1 MG/ML
INJECTION, SOLUTION INTRAMUSCULAR; INTRAVENOUS
Status: DISCONTINUED | OUTPATIENT
Start: 2024-05-06 | End: 2024-05-06

## 2024-05-06 RX ORDER — DIPHENHYDRAMINE HCL 50 MG
50 CAPSULE ORAL ONCE
Status: COMPLETED | OUTPATIENT
Start: 2024-05-06 | End: 2024-05-06

## 2024-05-06 RX ORDER — HEPARIN SODIUM 1000 [USP'U]/ML
INJECTION, SOLUTION INTRAVENOUS; SUBCUTANEOUS
Status: DISCONTINUED | OUTPATIENT
Start: 2024-05-06 | End: 2024-05-06

## 2024-05-06 RX ADMIN — DIPHENHYDRAMINE HYDROCHLORIDE 50 MG: 50 CAPSULE ORAL at 07:05

## 2024-05-06 RX ADMIN — POTASSIUM BICARBONATE 20 MEQ: 391 TABLET, EFFERVESCENT ORAL at 09:05

## 2024-05-06 RX ADMIN — CARVEDILOL 3.12 MG: 3.12 TABLET, FILM COATED ORAL at 08:05

## 2024-05-06 RX ADMIN — HYDRALAZINE HYDROCHLORIDE 25 MG: 25 TABLET, FILM COATED ORAL at 09:05

## 2024-05-06 RX ADMIN — HYDRALAZINE HYDROCHLORIDE 25 MG: 25 TABLET, FILM COATED ORAL at 08:05

## 2024-05-06 RX ADMIN — ATORVASTATIN CALCIUM 80 MG: 40 TABLET, FILM COATED ORAL at 08:05

## 2024-05-06 RX ADMIN — SODIUM CHLORIDE: 9 INJECTION, SOLUTION INTRAVENOUS at 09:05

## 2024-05-06 RX ADMIN — AMLODIPINE BESYLATE 10 MG: 10 TABLET ORAL at 07:05

## 2024-05-06 RX ADMIN — OXYCODONE 5 MG: 5 TABLET ORAL at 10:05

## 2024-05-06 RX ADMIN — FUROSEMIDE 80 MG: 80 TABLET ORAL at 05:05

## 2024-05-06 RX ADMIN — ACETAMINOPHEN 650 MG: 325 TABLET ORAL at 07:05

## 2024-05-06 RX ADMIN — ASPIRIN 81 MG CHEWABLE TABLET 81 MG: 81 TABLET CHEWABLE at 07:05

## 2024-05-06 RX ADMIN — OXYCODONE 5 MG: 5 TABLET ORAL at 04:05

## 2024-05-06 RX ADMIN — CARVEDILOL 3.12 MG: 3.12 TABLET, FILM COATED ORAL at 09:05

## 2024-05-06 NOTE — BRIEF OP NOTE
"    Post Cath Note  Referring Physician: Anand Keen MD  Procedure: ANGIOGRAM, CORONARY ARTERY (Right)      : Anand Keen MD     Referring Physician: Jacky Moore     All Operators: Surgeon(s):  Tafur Soto, Jose D., MD Gillies, Connor M, DO Maitas, Oscar, MD     Preoperative Diagnosis: Chest pain [R07.9]     Postop Diagnosis: Chest pain [R07.9]    Treatments/Procedures: Procedure(s) (LRB):  ANGIOGRAM, CORONARY ARTERY (Right)    Estimated Blood loss: <50 cc         Access: Right radial    LVEDP 20 mmHg    See full report for further details    Intervention:   Successful coronary angiogram and LHC. Has normal coronary arteries.     Closure device: Radial band    Post Cath Exam:   BP (!) 153/90 (BP Location: Left arm, Patient Position: Lying)   Pulse 99   Temp 98.1 °F (36.7 °C) (Oral)   Resp 17   Ht 5' 6" (1.676 m)   Wt 89.9 kg (198 lb 3.1 oz)   SpO2 97%   BMI 31.99 kg/m²   No unusual pain, hematoma, thrill or bruit at vascular access site.  Distal pulse present without signs of ischemia.    Recommendations:   - Routine post-cath care  - IVF at 125 cc/hr for 4 hrs    Tyron Kenny   "

## 2024-05-06 NOTE — PROGRESS NOTES
[facility] - Cardiology Stepdown  Interventional Cardiology  Progress Note    Patient Name: Thomas Horvath  MRN: [id number]  Admission Date: 4/30/2024  Hospital Length of Stay: 6 days  Code Status: Full Code   Attending Physician: Anand Keen MD   Primary Care Physician: Sissy Lacy MD  Principal Problem:NSTEMI (non-ST elevated myocardial infarction)    Subjective:     Interval History: No complaints, no chest pain.    Objective:     Vital Signs (Most Recent):  Temp: 97.5 °F (36.4 °C) (05/06/24 0529)  Pulse: 85 (05/06/24 0529)  Resp: 18 (05/06/24 0529)  BP: (!) 104/55 (05/06/24 0529)  SpO2: 97 % (05/06/24 0529) Vital Signs (24h Range):  Temp:  [97.5 °F (36.4 °C)-98.6 °F (37 °C)] 97.5 °F (36.4 °C)  Pulse:  [79-94] 85  Resp:  [18] 18  SpO2:  [97 %-98 %] 97 %  BP: (104-146)/(55-86) 104/55     Weight: 89.9 kg (198 lb 3.1 oz)  Body mass index is 31.99 kg/m².    SpO2: 97 %         Intake/Output Summary (Last 24 hours) at 5/6/2024 0742  Last data filed at 5/6/2024 0640  Gross per 24 hour   Intake 360 ml   Output 2000 ml   Net -1640 ml       Lines/Drains/Airways       Peripheral Intravenous Line  Duration                  Peripheral IV - Single Lumen 04/30/24 1903 20 G Right Antecubital 5 days         Peripheral IV - Single Lumen 04/30/24 2218 20 G Left Antecubital 5 days                     Physical Exam  Constitutional:       Appearance: Normal appearance.   Cardiovascular:      Heart sounds: Normal heart sounds. No murmur heard.     No gallop.      Comments:   2+ DP  2+ PT  2+ Radial  No carotid bruit  Pulmonary:      Effort: Pulmonary effort is normal.      Breath sounds: Normal breath sounds. No rales.   Musculoskeletal:      Right lower leg: No edema.      Left lower leg: No edema.   Skin:     General: Skin is warm and dry.   Neurological:      Mental Status: He is alert and oriented to person, place, and time. Mental status is at baseline.            Significant Labs: All pertinent lab results from the last  24 hours have been reviewed.    Significant Imaging:  Reviewed  Assessment and Plan:     Patient is a 33 y.o. male presenting with:    Cardiac/Vascular  * NSTEMI (non-ST elevated myocardial infarction)  33yoM with a hx of repaired Tetralogy of Fallot (right arch, right coronary from left sinus), replacement of pulmonary valve with a 23-mm pulmonary homograft (07/07/2006) - epicardial LV pacing wires in place, severe homograft obstruction s/p RVOT stents and Anne valve 7/23/15 and working well on 03/2023, mild pulmonary artery hypertension, trace aortic insufficiency and mild aortic root enlargement, long history of decreased LV function - possibly exacerbated by high afterload (40-45% on 2023), and HTN, who was transferred to [facility] on 04/30/2024 with 2-3 days of intermittent chest tightness that was resolved with NTG and elevated troponin to 4.6. He is on a lasix gtt currently and has not had chest pain since transfer from the OSH.     Creatinine this morning increased from 0.9 to 1.6.    Recommendations:  - [facility] today    [facility] +/- PCI. Patient is a [facility] candidate  - Anti-thrombotic Therapy: ASA, heparin. Plavix afterward if needed  - Access: R radial  - Will get Bendaryl 50mg pO prior to procedure  - All patient's questions were answered.  -The risks, benefits and alternatives of the procedure were explained to the patient.   -The risks of coronary angiography include but are not limited to: bleeding, infection, heart rhythm abnormalities, allergic reactions, kidney injury and potential need for dialysis, stroke and death.   - Should stenting be indicated, the patient has agreed to dual anti-platelet therapy with a drug-eluting stent   - Additionally, pt is aware that non-compliance is likely to result in stent clotting with heart attack, heart failure, and/or death  -The risks of moderate sedation include hypotension, respiratory depression, arrhythmias, bronchospasm, and death.   - Informed consent was obtained  and the  patient is agreeable to proceed with the procedure.        VTE Risk Mitigation (From admission, onward)           Ordered     IP VTE HIGH RISK PATIENT  Once         04/30/24 2355     Place sequential compression device  Until discontinued         04/30/24 2355     heparin 25,000 units in dextrose 5% (100 units/ml) IV bolus from bag LOW INTENSITY nomogram - [facility]  As needed (PRN)        Question:  Heparin Infusion Adjustment (DO NOT MODIFY ANSWER)  Answer:  [email or web address]\epic\Images\Pharmacy\HeparinInfusions\heparin LOW INTENSITY nomogram for [facility] [id number].pdf    04/30/24 2050     heparin 25,000 units in dextrose 5% (100 units/ml) IV bolus from bag LOW INTENSITY nomogram - [facility]  As needed (PRN)        Question:  Heparin Infusion Adjustment (DO NOT MODIFY ANSWER)  Answer:  [email or web address]\epic\Images\Pharmacy\HeparinInfusions\heparin LOW INTENSITY nomogram for [facility] [id number].pdf    04/30/24 2050     heparin 25,000 units in dextrose 5% 250 mL (100 units/mL) infusion LOW INTENSITY nomogram - [facility]  Continuous        Question:  Begin at (units/kg/hr)  Answer:  12    04/30/24 2050                    Connor M Gillies,   Interventional Cardiology  [facility] - Cardiology Stepdown

## 2024-05-06 NOTE — NURSING
TR Band removed no bleeding, no hematoma,no numbness or tingling noted. Will continue to monitor.

## 2024-05-06 NOTE — PLAN OF CARE
AAOX4,VSS,O2 sats>95% on RA.Plan of care discussed with patient.Patient has no complaints of pain/SOB. Discussed medications and care. Patient has no questions at this time.Pt visualised and stable.Call light within reach.Pt resting,bed at lowest position.

## 2024-05-06 NOTE — SUBJECTIVE & OBJECTIVE
Interval History: No acute issues overnight. //90 with a HR , In 360cc, Out 2000cc, net -1640L. Morning labs show Hb 17.1 and Cr 1.5. C today unremarkable for coronary artery disease. Some cath insertion site pain post procedure without swelling.    Review of Systems   Constitutional: Negative.   HENT: Negative.     Cardiovascular:  Negative for chest pain, claudication, cyanosis, dyspnea on exertion, irregular heartbeat, leg swelling, near-syncope, orthopnea, palpitations, paroxysmal nocturnal dyspnea and syncope.   Respiratory: Negative.     Endocrine: Negative.    Musculoskeletal: Negative.    Gastrointestinal: Negative.    Genitourinary: Negative.    Neurological: Negative.      Objective:     Vital Signs (Most Recent):  Temp: 98.3 °F (36.8 °C) (05/06/24 1321)  Pulse: 91 (05/06/24 1608)  Resp: 18 (05/06/24 1634)  BP: 115/65 (05/06/24 1608)  SpO2: 97 % (05/06/24 1608) Vital Signs (24h Range):  Temp:  [97.5 °F (36.4 °C)-98.6 °F (37 °C)] 98.3 °F (36.8 °C)  Pulse:  [83-99] 91  Resp:  [14-18] 18  SpO2:  [93 %-99 %] 97 %  BP: (104-153)/(55-90) 115/65     Weight: 89.9 kg (198 lb 3.1 oz)  Body mass index is 31.99 kg/m².     SpO2: 97 %         Intake/Output Summary (Last 24 hours) at 5/6/2024 1645  Last data filed at 5/6/2024 0923  Gross per 24 hour   Intake 480 ml   Output 2450 ml   Net -1970 ml       Lines/Drains/Airways       Peripheral Intravenous Line  Duration                  Peripheral IV - Single Lumen 04/30/24 1903 20 G Right Antecubital 5 days         Peripheral IV - Single Lumen 04/30/24 2218 20 G Left Antecubital 5 days                  Telemetry: NSR     Physical Exam  Vitals and nursing note reviewed.   Constitutional:       General: He is not in acute distress.     Appearance: Normal appearance. He is normal weight. He is not ill-appearing or diaphoretic.   HENT:      Head: Normocephalic and atraumatic.   Eyes:      Pupils: Pupils are equal, round, and reactive to light.  "  Cardiovascular:      Rate and Rhythm: Normal rate and regular rhythm.      Pulses: Normal pulses.      Heart sounds: Normal heart sounds.   Pulmonary:      Effort: Pulmonary effort is normal.      Breath sounds: Normal breath sounds.   Abdominal:      General: Abdomen is flat. Bowel sounds are normal. There is no distension.      Palpations: Abdomen is soft. There is no mass.      Tenderness: There is no abdominal tenderness.   Musculoskeletal:         General: Normal range of motion.   Skin:     General: Skin is warm.      Capillary Refill: Capillary refill takes less than 2 seconds.   Neurological:      General: No focal deficit present.      Mental Status: He is alert and oriented to person, place, and time.            Significant Labs:     Recent Labs   Lab 05/04/24  0329 05/05/24  0649 05/06/24  0323   WBC 12.22 11.98 11.50   HGB 16.7 16.6 17.1   HCT 53.6 52.7 53.3    318 303       Recent Labs   Lab 05/04/24  0329 05/05/24  0649 05/05/24  1839 05/06/24  0322    137 137 135*   K 3.9 3.5 4.1 3.8    96 93* 95   CO2 26 29 27 25   BUN 29* 30* 33* 34*   CREATININE 1.5* 1.5* 1.6* 1.5*   CALCIUM 10.2 10.5 10.9* 10.7*   PHOS 4.2 5.1*  --  4.9*       Recent Labs   Lab 05/04/24 0329 05/05/24  0649 05/06/24  0322   ALKPHOS 83 86 90   BILITOT 0.7 0.9 1.0   PROT 7.8 8.1 8.4   ALT 16 35 60*   AST 25 53* 73*       Recent Labs   Lab 04/30/24  2106   CHOL 158   TRIG 84   HDL 37*     Lab Results   Component Value Date    CHOL 158 04/30/2024    HDL 37 (L) 04/30/2024    LDLCALC 104.2 04/30/2024    TRIG 84 04/30/2024       Recent Labs   Lab 05/02/24  1743 05/02/24  2307 05/03/24  0508   TROPONINI 3.052* 3.055* 3.205*       No results found for: "HGBA1C"    Lab Results   Component Value Date    BNP 73 05/06/2024     (H) 04/30/2024    BNP 79 03/23/2023     Significant Imaging:      [facility] 5/6/24  All coronary arteries visualized angiographically are normal without evidence of coronary artery disease.    Renal " US 05/02/2024    There is insignificant stenosis (0-59%) in the Right Renal Artery.    Elevated right renal resistive index suggestive of intrinsic kidney disease.    Right kidney 10.00 cm.    There is insignificant stenosis (0-59%) in the Left Renal Artery.    Elevated left renal resistive index suggestive of intrinsic kidney disease.    Left kidney 10.10 cm.    This was a technically difficult study. This study was limited due to excessive bowel gas.    TTE 05/01/2024  Technically difficult acoustic windows.  Qualitative impression of at least moderately dilated right atrium  No obvious signs of previously reported dilation of the tricuspid annulus.  There is trivial to mild tricuspid insufficiency.  Mild right ventricle hypertrophy with at least moderate dilation.  Qualitative impression of moderate to severely reduced systolic function of the inlet and apical segments of the right ventricle with large outflow tract patch.  Right ventricular pressure estimated 16 mmHg above right atrial pressure from reasonably well defined TR doppler profile.  Echodensity consistent with patch repair of ventricular septal defect and malalignment of the ventricular septum with no residual shunt demonstrated.  Images consistent with Anne implant in pulmonary homograft with peak velocity <1.7 m/sec and mean <7 mm Hg.  No significant pulmonary insufficiency.  2D imaging demonstrates well-developed confluence pulmonary branches.  The left atrium appears normal in size with walls not well defined for measurement.  Trivial mitral valve insufficiency.  Qualitative impression of mild concentric left ventricular hypertrophy.  Flattened septal motion with decreased movement of the LV free wall, SF = 16% and EF qualitatively estimated 25 -30% from off axis apical views.  Trivial aortic valve insufficiency.  Aortic dimensions:  Sinuses of Valsalva = 38 mm.  ST junction              = 33 mm.  Ascending aorta      = 38 mm.  Normal size  aortic arch with no evidence of coarctation.  No pericardial effusion.

## 2024-05-06 NOTE — ASSESSMENT & PLAN NOTE
#HFrEF 25-30%, NYHA I, ACC/AHA class C:  - Etiology: Likely structural Heart Disease  - Hemodynamic profile: Perfused and hypervolemic  - Difficult compliance as per outpatient cardiology notes, probably taking meds only once a day, which was evidenced by improvement in BP with adequate doses of medications  - Home GDMT: Carvedilol 25mg bid, Entresto 97-103mg bid, spironolactone held when entresto was initiated    - Volume status optimization  - Lasix gtt discontinued as patient approaches euvolemia  - Will continue diuresis with lasix 80mg po BID  - Daily monitoring: Standing weights, Strict I/O, Na restriction (<2g/day), Keep K>4 and Mg>2  - GDMT: Continue Carvedilol 3.125mg bid (plan to uptitrate and initiate aldactone)  - Holding entresto due to hypotension  - Not requiring vasodilator or inotropic support for now

## 2024-05-06 NOTE — ASSESSMENT & PLAN NOTE
1. 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   - epicardial left ventricular pacing wires in place.   3. 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 Mild pulmonary artery hypertension noted in the past.   5. Trace aortic insufficiency and mild aortic root enlargement  6. Long history of decreased LV function - possibly exacerbated by high afterload.   - mildly improved with ejection fraction around 40-45% on echocardiogram 2023  7. Hypertension - poor compliance in the past with medications, still an issue  Strong family history of hypertension.    Discussion:     No history of fever, and by report CBC does not suggest acute infection, but he is definitely at risk for endocarditis given his Anne valve in the pulmonary position.  Echo last Wednesday with markedly worsened EF compared to last echo.  Repeat echo Thursday afternoon to better assess the PV, which is functioning very well with absolutely no evidence of worsening stenosis or endocarditis.   Pt's renal fx is improving with creatinine today at 1.5.   Angiogram today showed coronary arteries to be normal with LVEDP of 200 mmHg (possibly due to long standing HTN). Possible that chest pain last week was due to coronary spasms. Will discuss with adult interventional card team.  Pt already has his annual [facility] office visit with echo and EKG scheduled for 5/16 with Dr. Corona.

## 2024-05-06 NOTE — ASSESSMENT & PLAN NOTE
Body mass index is 31.99 kg/m². Morbid obesity complicates all aspects of disease management from diagnostic modalities to treatment. Weight loss encouraged and health benefits explained to patient.

## 2024-05-06 NOTE — SUBJECTIVE & OBJECTIVE
Interval History:    Pt in good spirits today after returning to the cardiac stepdown unit after cardiac cath. He is being visited by his mom, girlfriend Mariia, and two children (1 yo and 5 yo). Pt went to the cath lab this AM for a Coronary Artery Angiogram with [facility]. Procedure went well and all of pt's coronary arteries shown to be normal. Pt's LVEDP is 20 mmHg.    Pt complained yesterday (per nursing note) of generalized pain and muscle spasm, Magnesium and BPM repeated. Mg normal, BMP with Cl of 93 and Ca of 10.9. Pts renal fx continues to show improvement, with creatine at 1.5 today (BUN at 34, eGFR 57.6).    Pt denies all cardiac sx today. He states that he is experiencing no pain or muscle spasm and he denies chest pain, palpitations, SOB, or dizziness. Pt asking frequently when he will be able to go home. Reiterated the importance of him prioritizing cardiac care moving forward (and post-DC) by attending all out-patient appts and taking all home meds. Pt and his girlfriend state that he will get a daily medication container and use with [facility].    Past Medical History:   Diagnosis Date    Aortic insufficiency     trace    Homograft cardiac valve stenosis     mild    Hypertension     Left ventricular dysfunction     Pulmonary artery hypertension     RV hypertension    Right ventricular dilation     S/P TOF (tetralogy of Fallot) repair 12/18/2014    TOF (tetralogy of Fallot)        Past Surgical History:   Procedure Laterality Date    balloon dilation of pulmonary atrery homograft  2008    CARDIAC VALVE SURGERY      7/23/15 @ [facility] [place]    epicardial pacing wires      LV    pulmonary valve replacement  July 7, 2006    TETRALOGY OF FALLOT REPAIR  1990       Review of patient's allergies indicates:  No Known Allergies    Current Facility-Administered Medications   Medication Dose Route Frequency Provider Last Rate Last Admin    acetaminophen tablet 650 mg  650 mg Oral Q8H PRN Kyler Alberto MD   650 mg  at 24 1648    aspirin chewable tablet 81 mg  81 mg Oral Daily Kyler Alberto MD   81 mg at 24 0735    atorvastatin tablet 80 mg  80 mg Oral QHS Kyler Alberto MD   80 mg at 24 2108    carvediloL tablet 3.125 mg  3.125 mg Oral BID Jonatan Randall MD   3.125 mg at 24 0902    furosemide tablet 80 mg  80 mg Oral BID Jesus Caputo MD   80 mg at 24 1705    hydrALAZINE tablet 25 mg  25 mg Oral Q12H Jonatan Randall MD   25 mg at 24 0902    melatonin tablet 9 mg  9 mg Oral Nightly PRN Kyler Alberto MD   9 mg at 24 0357    sodium chloride 0.9% flush 10 mL  10 mL Intravenous PRN Kyler Alberto MD        sodium chloride 0.9% flush 10 mL  10 mL Intravenous PRN Gillies, Connor M, DO         Family History       Problem Relation (Age of Onset)    Hypertension Mother    No Known Problems Father, Sister, Brother, Maternal Grandmother, Maternal Grandfather, Paternal Grandmother, Paternal Grandfather, Maternal Aunt, Maternal Uncle, Paternal Aunt, Paternal Uncle          Tobacco Use    Smoking status: Former     Current packs/day: 0.00     Types: Cigarettes     Start date: 2006     Quit date: 2015     Years since quittin.9    Smokeless tobacco: Never    Tobacco comments:     marijuana   Substance and Sexual Activity    Alcohol use: No     Alcohol/week: 0.0 standard drinks of alcohol    Drug use: Yes     Frequency: 7.0 times per week     Types: Marijuana    Sexual activity: Yes     Partners: Female     Objective:     Vital Signs (Most Recent):  Temp: 98.3 °F (36.8 °C) (24 1321)  Pulse: 86 (24 1630)  Resp: 18 (24 1634)  BP: 115/65 (24 1608)  SpO2: 97 % (24 1608) Vital Signs (24h Range):  Temp:  [97.5 °F (36.4 °C)-98.6 °F (37 °C)] 98.3 °F (36.8 °C)  Pulse:  [83-99] 86  Resp:  [14-18] 18  SpO2:  [93 %-99 %] 97 %  BP: (104-153)/(55-90) 115/65     Weight: 89.9 kg (198 lb 3.1 oz)  Body mass index is  31.99 kg/m².    SpO2: 97 %         Intake/Output Summary (Last 24 hours) at 5/6/2024 1835  Last data filed at 5/6/2024 0923  Gross per 24 hour   Intake 480 ml   Output 1550 ml   Net -1070 ml       Lines/Drains/Airways       Peripheral Intravenous Line  Duration                  Peripheral IV - Single Lumen 04/30/24 1903 20 G Right Antecubital 5 days         Peripheral IV - Single Lumen 04/30/24 2218 20 G Left Antecubital 5 days                     Physical Exam  Constitutional:       General: He is not in acute distress.     Appearance: He is obese. He is not ill-appearing.   HENT:      Head: Normocephalic and atraumatic.   Eyes:      Extraocular Movements: Extraocular movements intact.      Pupils: Pupils are equal, round, and reactive to light.   Cardiovascular:      Rate and Rhythm: Normal rate and regular rhythm.      Pulses: Normal pulses.      Comments: No murmur heard on auscultation. Fixed & split S2 sound  Pulmonary:      Effort: Pulmonary effort is normal.   Abdominal:      General: There is no distension.      Palpations: Abdomen is soft.      Tenderness: There is no abdominal tenderness.   Musculoskeletal:      Cervical back: Normal range of motion and neck supple.   Skin:     General: Skin is warm.      Capillary Refill: Capillary refill takes less than 2 seconds.   Neurological:      General: No focal deficit present.      Mental Status: He is oriented to person, place, and time.   Psychiatric:         Mood and Affect: Mood normal.         Behavior: Behavior normal.          Significant Labs:   Recent Labs   Lab 05/05/24  0649 05/05/24  1839 05/06/24  0322    153* 104    137 135*   K 3.5 4.1 3.8   CL 96 93* 95   CO2 29 27 25   BUN 30* 33* 34*   CREATININE 1.5* 1.6* 1.5*   CALCIUM 10.5 10.9* 10.7*   MG 2.1 2.3 2.2   , CMP   Recent Labs   Lab 05/05/24  0649 05/05/24  1839 05/06/24  0322    137 135*   K 3.5 4.1 3.8   CL 96 93* 95   CO2 29 27 25    153* 104   BUN 30* 33* 34*  "  CREATININE 1.5* 1.6* 1.5*   CALCIUM 10.5 10.9* 10.7*   PROT 8.1  --  8.4   ALBUMIN 3.9  --  4.0   BILITOT 0.9  --  1.0   ALKPHOS 86  --  90   AST 53*  --  73*   ALT 35  --  60*   ANIONGAP 12 17* 15   , CBC   Recent Labs   Lab 05/05/24  0649 05/06/24  0323   WBC 11.98 11.50   HGB 16.6 17.1   HCT 52.7 53.3    303   , INR   No results for input(s): "INR", "PROTIME" in the last 48 hours.  , Lipid Panel   No results for input(s): "CHOL", "HDL", "LDLCALC", "TRIG", "CHOLHDL" in the last 48 hours.  ,   Pathology Results  (Last 10 years)      None        , Troponin   No results for input(s): "TROPONINI" in the last 48 hours.  , and   Recent Lab Results  (Last 5 results in the past 24 hours)        05/06/24  1426   05/06/24  0751   05/06/24  0323   05/06/24  0322   05/05/24  1839        Albumin       4.0         ALP       90         ALT       60         Anion Gap       15   17       PTT       54.4  Comment: Refer to local heparin nomogram for intensity/dose specific   therapeutic   range.           AST       73         Baso #     0.09           Basophil %     0.8           BILIRUBIN TOTAL       1.0  Comment: For infants and newborns, interpretation of results should be based  on gestational age, weight and in agreement with clinical  observations.    Premature Infant recommended reference ranges:  Up to 24 hours.............<8.0 mg/dL  Up to 48 hours............<12.0 mg/dL  3-5 days..................<15.0 mg/dL  6-29 days.................<15.0 mg/dL           BNP 73  Comment: Values of less than 100 pg/ml are consistent with non-CHF populations.               BUN       34   33       Calcium       10.7   10.9       Chloride       95   93       CO2       25   27       Creatinine       1.5   1.6       Differential Method     Automated           eGFR       >60.0   57.6       Eos #     1.1           Eos %     9.4           Glucose       104   153       Gran # (ANC)     7.2           Gran %     62.3           Group & Rh   " B POS             Hematocrit     53.3           Hemoglobin     17.1           Immature Grans (Abs)     0.04  Comment: Mild elevation in immature granulocytes is non specific and   can be seen in a variety of conditions including stress response,   acute inflammation, trauma and pregnancy. Correlation with other   laboratory and clinical findings is essential.             Immature Granulocytes     0.3           INDIRECT WHITLEY   NEG             Lymph #     2.0           Lymph %     17.0           Magnesium        2.2   2.3       MCH     27.5           MCHC     32.1           MCV     86           Mono #     1.2           Mono %     10.2           MPV     10.7           nRBC     0           Phosphorus Level       4.9         Platelet Count     303           Potassium       3.8   4.1       PROTEIN TOTAL       8.4         RBC     6.21           RDW     13.6           Sodium       135   137       Specimen Outdate   05/09/2024 23:59             WBC     11.50                                  Significant Imaging:     Coronary Artery Angiogram with [facility]: (5/6/24)    Diagnostic  Dominance: Right    Left Main   The vessel was visualized by angiography, is moderate in size and is angiographically normal.      Left Anterior Descending   The vessel was visualized by angiography, is moderate in size and is angiographically normal.      Left Circumflex   The vessel was visualized by angiography, is moderate in size and is angiographically normal.      Right Coronary Artery   The vessel was visualized by angiography, is moderate in size and is angiographically normal. The vessel originates from the left coronary sinus.      Intervention     No interventions have been documented.     Left Heart Findings    Left Ventricle    LVEDP (Pre): 20 mmHG.     Recommendations     Routine post-cath care.   Continue medical management.     CV US Renal Artery Stenosis Hypertension (5/2/24)      There is insignificant stenosis (0-59%) in the Right  Renal Artery.    Elevated right renal resistive index suggestive of intrinsic kidney disease.    Right kidney 10.00 cm.    There is insignificant stenosis (0-59%) in the Left Renal Artery.    Elevated left renal resistive index suggestive of intrinsic kidney disease.    Left kidney 10.10 cm.    This was a technically difficult study. This study was limited due to excessive bowel gas.    Echo (5/1/24 at 8:55 AM)      Left Ventricle: Ventricular mass is normal. Mildly increased wall thickness. Severe global hypokinesis present. Septal motion is abnormal. There is severely reduced systolic function with a visually estimated ejection fraction of 15 - 20%. Ejection fraction by visual approximation is 18%. The biplane LVEF was much higher but not accurate due to technical issues. There is diastolic dysfunction but grade cannot be determined.    Right Ventricle: Systolic function is moderately reduced.    Right Atrium: Right atrium is moderately dilated.    IVC/SVC: Intermediate venous pressure at 8 mmHg.    Echo (5/1/24 at 1:38 PM)    IMPRESSION:  Patient admitted for suspected MI with poor LV function on initial echocardiogram-  Technically difficult acoustic windows.  Qualitative impression of at least moderately dilated right atrium  No obvious signs of previously reported dilation of the tricuspid annulus.  There is trivial to mild tricuspid insufficiency.  Mild right ventricle hypertrophy with at least moderate dilation.  Qualitative impression of moderate to severely reduced systolic function of the inlet and apical segments of the right ventricle with large outflow tract patch.  Right ventricular pressure estimated 16 mmHg above right atrial pressure from reasonably well defined TR doppler profile.  Echodensity consistent with patch repair of ventricular septal defect and malalignment of the ventricular septum with no residual shunt demonstrated.  Images consistent with Anne implant in pulmonary homograft with  peak velocity <1.7 m/sec and mean <7 mm Hg.  No significant pulmonary insufficiency.  2D imaging demonstrates well-developed confluence pulmonary branches.  The left atrium appears normal in size with walls not well defined for measurement.  Trivial mitral valve insufficiency.  Qualitative impression of mild concentric left ventricular hypertrophy.  Flattened septal motion with decreased movement of the LV free wall, SF = 16% and EF qualitatively estimated 25 -30% from off axis apical views.  Trivial aortic valve insufficiency.  Aortic dimensions:  Sinuses of Valsalva = 38 mm.  ST junction              = 33 mm.  Ascending aorta      = 38 mm.  Normal size aortic arch with no evidence of coarctation.  No pericardial effusion.

## 2024-05-06 NOTE — ASSESSMENT & PLAN NOTE
- Dramatic drop in blood pressure and rise in creatine after restarting home BP meds medications  - He had previously reported taking all medications; however, suspect non compliance given his response to restarting these meds including entresto    - Holding entresto   - Initially decreased coreg given ADHF. Will plan to increase now that patient is approaching euvolemia.  - Monitor kidney function  - Avoid nephrotoxic medications

## 2024-05-06 NOTE — ASSESSMENT & PLAN NOTE
33yoM with a hx of repaired Tetralogy of Fallot (right arch, right coronary from left sinus), replacement of pulmonary valve with a 23-mm pulmonary homograft (07/07/2006) - epicardial LV pacing wires in place, severe homograft obstruction s/p RVOT stents and Anne valve 7/23/15 and working well on 03/2023, mild pulmonary artery hypertension, trace aortic insufficiency and mild aortic root enlargement, long history of decreased LV function - possibly exacerbated by high afterload (40-45% on 2023), and HTN, who was transferred to [facility] on 04/30/2024 with 2-3 days of intermittent chest tightness that was resolved with NTG and elevated troponin to 4.6. He is on a lasix gtt currently and has not had chest pain since transfer from the OSH.     Creatinine this morning increased from 0.9 to 1.6.    Recommendations:  - [facility] today    [facility] +/- PCI. Patient is a [facility]CHO candidate  - Anti-thrombotic Therapy: ASA, heparin. Plavix afterward if needed  - Access: R radial  - Will get Bendaryl 50mg pO prior to procedure  - All patient's questions were answered.  -The risks, benefits and alternatives of the procedure were explained to the patient.   -The risks of coronary angiography include but are not limited to: bleeding, infection, heart rhythm abnormalities, allergic reactions, kidney injury and potential need for dialysis, stroke and death.   - Should stenting be indicated, the patient has agreed to dual anti-platelet therapy with a drug-eluting stent   - Additionally, pt is aware that non-compliance is likely to result in stent clotting with heart attack, heart failure, and/or death  -The risks of moderate sedation include hypotension, respiratory depression, arrhythmias, bronchospasm, and death.   - Informed consent was obtained and the  patient is agreeable to proceed with the procedure.

## 2024-05-06 NOTE — SUBJECTIVE & OBJECTIVE
Interval History:    Pt in good spirits today after returning to the cardiac stepdown unit after cardiac cath. He is being visited by his mom, girlfriend Mariia, and two children (3 yo and 5 yo). Pt went to the cath lab this AM for a Coronary Artery Angiogram with [facility]. Procedure went well and all of pt's coronary arteries shown to be normal. Pt's LVEDP is 20 mmHg.    Pt complained yesterday (per nursing note) of generalized pain and muscle spasm, Magnesium and BPM repeated. Mg normal, BMP with Cl of 93 and Ca of 10.9. Pts renal fx continues to show improvement, with creatine at 1.5 today (BUN at 34, eGFR 57.6).    Pt denies all cardiac sx today. He states that he is experiencing no pain or muscle spasm and he denies chest pain, palpitations, SOB, or dizziness. Pt asking frequently when he will be able to go home. Reiterated the importance of him prioritizing cardiac care moving forward (and post-DC) by attending all out-patient appts and taking all home meds. Pt and his girlfriend state that he will get a daily medication container and use with [facility].    Past Medical History:   Diagnosis Date    Aortic insufficiency     trace    Homograft cardiac valve stenosis     mild    Hypertension     Left ventricular dysfunction     Pulmonary artery hypertension     RV hypertension    Right ventricular dilation     S/P TOF (tetralogy of Fallot) repair 12/18/2014    TOF (tetralogy of Fallot)        Past Surgical History:   Procedure Laterality Date    balloon dilation of pulmonary atrery homograft  2008    CARDIAC VALVE SURGERY      7/23/15 @ [facility]    epicardial pacing wires      LV    pulmonary valve replacement  July 7, 2006    TETRALOGY OF FALLOT REPAIR  1990       Review of patient's allergies indicates:  No Known Allergies    Current Facility-Administered Medications   Medication Dose Route Frequency Provider Last Rate Last Admin    0.9%  NaCl infusion   Intravenous Continuous Gillies, Connor M,  mL/hr at 05/06/24  0901 New Bag at 05/06/24 0901    acetaminophen tablet 650 mg  650 mg Oral Q8H PRN Kyler Alberto MD   650 mg at 05/05/24 1648    amLODIPine tablet 10 mg  10 mg Oral Daily Kyler Alberto MD   10 mg at 05/06/24 0739    aspirin chewable tablet 81 mg  81 mg Oral Daily Kyler Alberto MD   81 mg at 05/06/24 0735    atorvastatin tablet 80 mg  80 mg Oral QHS Kyler Alberto MD   80 mg at 05/05/24 2108    carvediloL tablet 3.125 mg  3.125 mg Oral BID Jonatan MD   3.125 mg at 05/06/24 0902    fentaNYL 50 mcg/mL injection    PRN Anand Keen MD   25 mcg at 05/06/24 0821    furosemide (Lasix) 500 mg in 50 mL infusion (conc: 10 mg/mL)  10 mg/hr Intravenous Continuous Kyler Alberto MD 1 mL/hr at 05/04/24 1053 10 mg/hr at 05/04/24 1053    heparin (porcine) injection    PRN Anand Keen MD   5,000 Units at 05/06/24 0819    heparin infusion 1,000 units/500 ml in 0.9% NaCl (on sterile field)    PRN Anand Keen MD   1,500 mL at 05/06/24 0807    hydrALAZINE tablet 25 mg  25 mg Oral Q12H Jonatan Randall MD   25 mg at 05/06/24 0902    iohexoL (OMNIPAQUE 350) injection    PRN Anand Keen MD   37 mL at 05/06/24 0835    LIDOcaine (PF) 20 mg/ml (2%) injection    PRN Anand Keen MD   20 mL at 05/06/24 0807    melatonin tablet 9 mg  9 mg Oral Nightly PRN Kyler Alberto MD   9 mg at 05/01/24 0357    midazolam injection    PRN Anand Keen MD   0.5 mg at 05/06/24 0821    sodium chloride 0.9% flush 10 mL  10 mL Intravenous PRN Kyler Alberto MD        sodium chloride 0.9% flush 10 mL  10 mL Intravenous PRN Gillies, Connor M, DO        verapamiL 2.5 mg, nitroGLYCERIN 2.5 mg in sodium chloride 0.9% 1,000 mL    PRN Anand Keen MD   Given at 05/06/24 0807     Family History       Problem Relation (Age of Onset)    Hypertension Mother    No Known Problems Father, Sister, Brother,  Maternal Grandmother, Maternal Grandfather, Paternal Grandmother, Paternal Grandfather, Maternal Aunt, Maternal Uncle, Paternal Aunt, Paternal Uncle          Tobacco Use    Smoking status: Former     Current packs/day: 0.00     Types: Cigarettes     Start date: 2006     Quit date: 2015     Years since quittin.9    Smokeless tobacco: Never    Tobacco comments:     marijuana   Substance and Sexual Activity    Alcohol use: No     Alcohol/week: 0.0 standard drinks of alcohol    Drug use: Yes     Frequency: 7.0 times per week     Types: Marijuana    Sexual activity: Yes     Partners: Female     Objective:     Vital Signs (Most Recent):  Temp: 98 °F (36.7 °C) (24 1039)  Pulse: 86 (24 1039)  Resp: 15 (24 1039)  BP: 115/80 (24 1039)  SpO2: 98 % (24 1039) Vital Signs (24h Range):  Temp:  [97.5 °F (36.4 °C)-98.6 °F (37 °C)] 98 °F (36.7 °C)  Pulse:  [79-99] 86  Resp:  [15-18] 15  SpO2:  [93 %-98 %] 98 %  BP: (104-153)/(55-90) 115/80     Weight: 89.9 kg (198 lb 3.1 oz)  Body mass index is 31.99 kg/m².    SpO2: 98 %         Intake/Output Summary (Last 24 hours) at 2024 1043  Last data filed at 2024 0923  Gross per 24 hour   Intake 600 ml   Output 2450 ml   Net -1850 ml       Lines/Drains/Airways       Peripheral Intravenous Line  Duration                  Peripheral IV - Single Lumen 24 1903 20 G Right Antecubital 5 days         Peripheral IV - Single Lumen 24 2218 20 G Left Antecubital 5 days                     Physical Exam  Constitutional:       General: He is not in acute distress.     Appearance: He is obese. He is not ill-appearing.   HENT:      Head: Normocephalic and atraumatic.   Eyes:      Extraocular Movements: Extraocular movements intact.      Pupils: Pupils are equal, round, and reactive to light.   Cardiovascular:      Rate and Rhythm: Normal rate and regular rhythm.      Pulses: Normal pulses.      Comments: No murmur heard on auscultation. Fixed &  "split S2 sound  Pulmonary:      Effort: Pulmonary effort is normal.   Abdominal:      General: There is no distension.      Palpations: Abdomen is soft.      Tenderness: There is no abdominal tenderness.   Musculoskeletal:      Cervical back: Normal range of motion and neck supple.   Skin:     General: Skin is warm.      Capillary Refill: Capillary refill takes less than 2 seconds.   Neurological:      General: No focal deficit present.      Mental Status: He is oriented to person, place, and time.   Psychiatric:         Mood and Affect: Mood normal.         Behavior: Behavior normal.        Significant Labs:   Recent Labs   Lab 05/05/24 0649 05/05/24 1839 05/06/24 0322    153* 104    137 135*   K 3.5 4.1 3.8   CL 96 93* 95   CO2 29 27 25   BUN 30* 33* 34*   CREATININE 1.5* 1.6* 1.5*   CALCIUM 10.5 10.9* 10.7*   MG 2.1 2.3 2.2   , CMP   Recent Labs   Lab 05/05/24 0649 05/05/24 1839 05/06/24 0322    137 135*   K 3.5 4.1 3.8   CL 96 93* 95   CO2 29 27 25    153* 104   BUN 30* 33* 34*   CREATININE 1.5* 1.6* 1.5*   CALCIUM 10.5 10.9* 10.7*   PROT 8.1  --  8.4   ALBUMIN 3.9  --  4.0   BILITOT 0.9  --  1.0   ALKPHOS 86  --  90   AST 53*  --  73*   ALT 35  --  60*   ANIONGAP 12 17* 15   , CBC   Recent Labs   Lab 05/05/24 0649 05/06/24 0323   WBC 11.98 11.50   HGB 16.6 17.1   HCT 52.7 53.3    303   , INR   No results for input(s): "INR", "PROTIME" in the last 48 hours.  , Lipid Panel   No results for input(s): "CHOL", "HDL", "LDLCALC", "TRIG", "CHOLHDL" in the last 48 hours.  ,   Pathology Results  (Last 10 years)      None        , Troponin   No results for input(s): "TROPONINI" in the last 48 hours.  , and   Recent Lab Results         05/06/24  0751   05/06/24 0323 05/06/24 0322 05/05/24  1839        Albumin     4.0         ALP     90         ALT     60         Anion Gap     15   17       PTT     54.4  Comment: Refer to local heparin nomogram for intensity/dose specific "   therapeutic   range.           AST     73         Baso #   0.09           Basophil %   0.8           BILIRUBIN TOTAL     1.0  Comment: For infants and newborns, interpretation of results should be based  on gestational age, weight and in agreement with clinical  observations.    Premature Infant recommended reference ranges:  Up to 24 hours.............<8.0 mg/dL  Up to 48 hours............<12.0 mg/dL  3-5 days..................<15.0 mg/dL  6-29 days.................<15.0 mg/dL           BUN     34   33       Calcium     10.7   10.9       Chloride     95   93       CO2     25   27       Creatinine     1.5   1.6       Differential Method   Automated           eGFR     >60.0   57.6       Eos #   1.1           Eos %   9.4           Glucose     104   153       Gran # (ANC)   7.2           Gran %   62.3           Group & Rh B POS             Hematocrit   53.3           Hemoglobin   17.1           Immature Grans (Abs)   0.04  Comment: Mild elevation in immature granulocytes is non specific and   can be seen in a variety of conditions including stress response,   acute inflammation, trauma and pregnancy. Correlation with other   laboratory and clinical findings is essential.             Immature Granulocytes   0.3           INDIRECT WHITLEY NEG             Lymph #   2.0           Lymph %   17.0           Magnesium      2.2   2.3       MCH   27.5           MCHC   32.1           MCV   86           Mono #   1.2           Mono %   10.2           MPV   10.7           nRBC   0           Phosphorus Level     4.9         Platelet Count   303           Potassium     3.8   4.1       PROTEIN TOTAL     8.4         RBC   6.21           RDW   13.6           Sodium     135   137       Specimen Outdate 05/09/2024 23:59             WBC   11.50                   Significant Imaging:     Coronary Artery Angiogram with [facility]: (5/6/24)    Diagnostic  Dominance: Right    Left Main   The vessel was visualized by angiography, is moderate in size  and is angiographically normal.      Left Anterior Descending   The vessel was visualized by angiography, is moderate in size and is angiographically normal.      Left Circumflex   The vessel was visualized by angiography, is moderate in size and is angiographically normal.      Right Coronary Artery   The vessel was visualized by angiography, is moderate in size and is angiographically normal. The vessel originates from the left coronary sinus.      Intervention     No interventions have been documented.     Left Heart Findings    Left Ventricle    LVEDP (Pre): 20 mmHG.     Recommendations     Routine post-cath care.   Continue medical management.     CV US Renal Artery Stenosis Hypertension (5/2/24)      There is insignificant stenosis (0-59%) in the Right Renal Artery.    Elevated right renal resistive index suggestive of intrinsic kidney disease.    Right kidney 10.00 cm.    There is insignificant stenosis (0-59%) in the Left Renal Artery.    Elevated left renal resistive index suggestive of intrinsic kidney disease.    Left kidney 10.10 cm.    This was a technically difficult study. This study was limited due to excessive bowel gas.    Echo (5/1/24 at 8:55 AM)      Left Ventricle: Ventricular mass is normal. Mildly increased wall thickness. Severe global hypokinesis present. Septal motion is abnormal. There is severely reduced systolic function with a visually estimated ejection fraction of 15 - 20%. Ejection fraction by visual approximation is 18%. The biplane LVEF was much higher but not accurate due to technical issues. There is diastolic dysfunction but grade cannot be determined.    Right Ventricle: Systolic function is moderately reduced.    Right Atrium: Right atrium is moderately dilated.    IVC/SVC: Intermediate venous pressure at 8 mmHg.    Echo (5/1/24 at 1:38 PM)    IMPRESSION:  Patient admitted for suspected MI with poor LV function on initial echocardiogram-  Technically difficult acoustic  windows.  Qualitative impression of at least moderately dilated right atrium  No obvious signs of previously reported dilation of the tricuspid annulus.  There is trivial to mild tricuspid insufficiency.  Mild right ventricle hypertrophy with at least moderate dilation.  Qualitative impression of moderate to severely reduced systolic function of the inlet and apical segments of the right ventricle with large outflow tract patch.  Right ventricular pressure estimated 16 mmHg above right atrial pressure from reasonably well defined TR doppler profile.  Echodensity consistent with patch repair of ventricular septal defect and malalignment of the ventricular septum with no residual shunt demonstrated.  Images consistent with Anne implant in pulmonary homograft with peak velocity <1.7 m/sec and mean <7 mm Hg.  No significant pulmonary insufficiency.  2D imaging demonstrates well-developed confluence pulmonary branches.  The left atrium appears normal in size with walls not well defined for measurement.  Trivial mitral valve insufficiency.  Qualitative impression of mild concentric left ventricular hypertrophy.  Flattened septal motion with decreased movement of the LV free wall, SF = 16% and EF qualitatively estimated 25 -30% from off axis apical views.  Trivial aortic valve insufficiency.  Aortic dimensions:  Sinuses of Valsalva = 38 mm.  ST junction              = 33 mm.  Ascending aorta      = 38 mm.  Normal size aortic arch with no evidence of coarctation.  No pericardial effusion.

## 2024-05-06 NOTE — ASSESSMENT & PLAN NOTE
1. 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   - epicardial left ventricular pacing wires in place.   3. 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 Mild pulmonary artery hypertension noted in the past.   5. Trace aortic insufficiency and mild aortic root enlargement  6. Long history of decreased LV function - possibly exacerbated by high afterload.   - moderately reduced LV systolic function, elevated LVEDP  - normal coronary arteries on cath 5/6/24  7. Hypertension - poor compliance in the past with medications    Recommendations:  Agree with plans to advance carvedilol dose, hold amlodipine, likely start Aldactone tomorrow.  At home, he was on 25 mg of carvedilol twice a day and high-dose Entresto, but I really question his compliance given how much his blood pressure dropped when he was getting his home medications in the hospital.  We will need to reassess his echocardiogram as an outpatient once he is medically maximized for goal-directed medical therapy.  We need to strongly consider whether a defibrillator for primary prevention is indicated.  He has a significant risk factor in his LV dysfunction.

## 2024-05-06 NOTE — PROGRESS NOTES
[facility] Stepdown  Cardiology  Progress Note    Patient Name: Thomas Horvath  MRN: [id number]  Admission Date: 4/30/2024  Hospital Length of Stay: 6 days  Code Status: Full Code   Attending Physician: Anand Keen MD   Primary Care Physician: Sissy Lacy MD  Expected Discharge Date: 5/7/2024  Principal Problem:NSTEMI (non-ST elevated myocardial infarction)    Subjective:     Hospital Course:   32yo M patient with repaired Tetralogy of Fallot (right arch, right coronary from left sinus), replacement of pulmonary valve with a 23-mm pulmonary homograft (07/07/2006), presented with chest pain and elevated troponin, ACS was started for NSTEMI. Echo showed normal daisy valve and EF of 30%. PCI after renal function improves.    Interval History:    Pt in good spirits today after returning to the cardiac stepdown unit after cardiac cath. He is being visited by his mom, girlfriend Mariia, and two children (1 yo and 5 yo). Pt went to the cath lab this AM for a Coronary Artery Angiogram with [facility]. Procedure went well and all of pt's coronary arteries shown to be normal. Pt's LVEDP is 20 mmHg.    Pt complained yesterday (per nursing note) of generalized pain and muscle spasm, Magnesium and BPM repeated. Mg normal, BMP with Cl of 93 and Ca of 10.9. Pts renal fx continues to show improvement, with creatine at 1.5 today (BUN at 34, eGFR 57.6).    Pt denies all cardiac sx today. He states that he is experiencing no pain or muscle spasm and he denies chest pain, palpitations, SOB, or dizziness. Pt asking frequently when he will be able to go home. Reiterated the importance of him prioritizing cardiac care moving forward (and post-DC) by attending all out-patient appts and taking all home meds. Pt and his girlfriend state that he will get a daily medication container and use with [facility].    Past Medical History:   Diagnosis Date    Aortic insufficiency     trace    Homograft cardiac valve stenosis     mild     Hypertension     Left ventricular dysfunction     Pulmonary artery hypertension     RV hypertension    Right ventricular dilation     S/P TOF (tetralogy of Fallot) repair 12/18/2014    TOF (tetralogy of Fallot)        Past Surgical History:   Procedure Laterality Date    balloon dilation of pulmonary atrery homograft  2008    CARDIAC VALVE SURGERY      7/23/15 @ [facility] [place]    epicardial pacing wires      LV    pulmonary valve replacement  July 7, 2006    TETRALOGY OF FALLOT REPAIR  1990       Review of patient's allergies indicates:  No Known Allergies    Current Facility-Administered Medications   Medication Dose Route Frequency Provider Last Rate Last Admin    0.9%  NaCl infusion   Intravenous Continuous Gillies, Connor M,  mL/hr at 05/06/24 0901 New Bag at 05/06/24 0901    acetaminophen tablet 650 mg  650 mg Oral Q8H PRN Kyler Alberto MD   650 mg at 05/05/24 1648    amLODIPine tablet 10 mg  10 mg Oral Daily Kyler Alberto MD   10 mg at 05/06/24 0739    aspirin chewable tablet 81 mg  81 mg Oral Daily Kyler Alberto MD   81 mg at 05/06/24 0735    atorvastatin tablet 80 mg  80 mg Oral QHS Kyler Alberto MD   80 mg at 05/05/24 2108    carvediloL tablet 3.125 mg  3.125 mg Oral BID Jonatan Randall MD   3.125 mg at 05/06/24 0902    fentaNYL 50 mcg/mL injection    PRN Anand Keen MD   25 mcg at 05/06/24 0821    furosemide (Lasix) 500 mg in 50 mL infusion (conc: 10 mg/mL)  10 mg/hr Intravenous Continuous Kyler Alberto MD 1 mL/hr at 05/04/24 1053 10 mg/hr at 05/04/24 1053    heparin (porcine) injection    PRN Anand Keen MD   5,000 Units at 05/06/24 0819    heparin infusion 1,000 units/500 ml in 0.9% NaCl (on sterile field)    PRN Anand Keen MD   1,500 mL at 05/06/24 0807    hydrALAZINE tablet 25 mg  25 mg Oral Q12H Jonatan Randall MD   25 mg at 05/06/24 0902    iohexoL (OMNIPAQUE 350) injection    PRN Anand Keen  MD BUCK   37 mL at 24 0835    LIDOcaine (PF) 20 mg/ml (2%) injection    PRN Anand Keen MD   20 mL at 24 0807    melatonin tablet 9 mg  9 mg Oral Nightly PRN Kyler Alberto MD   9 mg at 24 0357    midazolam injection    PRN Anand Keen MD   0.5 mg at 24 0821    sodium chloride 0.9% flush 10 mL  10 mL Intravenous PRN Kyler Alberto MD        sodium chloride 0.9% flush 10 mL  10 mL Intravenous PRN Gillies, Connor M,         verapamiL 2.5 mg, nitroGLYCERIN 2.5 mg in sodium chloride 0.9% 1,000 mL    PRN Anand Keen MD   Given at 24 0807     Family History       Problem Relation (Age of Onset)    Hypertension Mother    No Known Problems Father, Sister, Brother, Maternal Grandmother, Maternal Grandfather, Paternal Grandmother, Paternal Grandfather, Maternal Aunt, Maternal Uncle, Paternal Aunt, Paternal Uncle          Tobacco Use    Smoking status: Former     Current packs/day: 0.00     Types: Cigarettes     Start date: 2006     Quit date: 2015     Years since quittin.9    Smokeless tobacco: Never    Tobacco comments:     marijuana   Substance and Sexual Activity    Alcohol use: No     Alcohol/week: 0.0 standard drinks of alcohol    Drug use: Yes     Frequency: 7.0 times per week     Types: Marijuana    Sexual activity: Yes     Partners: Female     Objective:     Vital Signs (Most Recent):  Temp: 98 °F (36.7 °C) (24 1039)  Pulse: 86 (24 1039)  Resp: 15 (24 1039)  BP: 115/80 (24 1039)  SpO2: 98 % (24 1039) Vital Signs (24h Range):  Temp:  [97.5 °F (36.4 °C)-98.6 °F (37 °C)] 98 °F (36.7 °C)  Pulse:  [79-99] 86  Resp:  [15-18] 15  SpO2:  [93 %-98 %] 98 %  BP: (104-153)/(55-90) 115/80     Weight: 89.9 kg (198 lb 3.1 oz)  Body mass index is 31.99 kg/m².    SpO2: 98 %         Intake/Output Summary (Last 24 hours) at 2024 1043  Last data filed at 2024 0923  Gross per 24 hour   Intake 600 ml    Output 2450 ml   Net -1850 ml       Lines/Drains/Airways       Peripheral Intravenous Line  Duration                  Peripheral IV - Single Lumen 04/30/24 1903 20 G Right Antecubital 5 days         Peripheral IV - Single Lumen 04/30/24 2218 20 G Left Antecubital 5 days                     Physical Exam  Constitutional:       General: He is not in acute distress.     Appearance: He is obese. He is not ill-appearing.   HENT:      Head: Normocephalic and atraumatic.   Eyes:      Extraocular Movements: Extraocular movements intact.      Pupils: Pupils are equal, round, and reactive to light.   Cardiovascular:      Rate and Rhythm: Normal rate and regular rhythm.      Pulses: Normal pulses.      Comments: No murmur heard on auscultation. Fixed & split S2 sound  Pulmonary:      Effort: Pulmonary effort is normal.   Abdominal:      General: There is no distension.      Palpations: Abdomen is soft.      Tenderness: There is no abdominal tenderness.   Musculoskeletal:      Cervical back: Normal range of motion and neck supple.   Skin:     General: Skin is warm.      Capillary Refill: Capillary refill takes less than 2 seconds.   Neurological:      General: No focal deficit present.      Mental Status: He is oriented to person, place, and time.   Psychiatric:         Mood and Affect: Mood normal.         Behavior: Behavior normal.        Significant Labs:   Recent Labs   Lab 05/05/24  0649 05/05/24  1839 05/06/24  0322    153* 104    137 135*   K 3.5 4.1 3.8   CL 96 93* 95   CO2 29 27 25   BUN 30* 33* 34*   CREATININE 1.5* 1.6* 1.5*   CALCIUM 10.5 10.9* 10.7*   MG 2.1 2.3 2.2   , CMP   Recent Labs   Lab 05/05/24  0649 05/05/24  1839 05/06/24  0322    137 135*   K 3.5 4.1 3.8   CL 96 93* 95   CO2 29 27 25    153* 104   BUN 30* 33* 34*   CREATININE 1.5* 1.6* 1.5*   CALCIUM 10.5 10.9* 10.7*   PROT 8.1  --  8.4   ALBUMIN 3.9  --  4.0   BILITOT 0.9  --  1.0   ALKPHOS 86  --  90   AST 53*  --  73*   ALT  "35  --  60*   ANIONGAP 12 17* 15   , CBC   Recent Labs   Lab 05/05/24  0649 05/06/24  0323   WBC 11.98 11.50   HGB 16.6 17.1   HCT 52.7 53.3    303   , INR   No results for input(s): "INR", "PROTIME" in the last 48 hours.  , Lipid Panel   No results for input(s): "CHOL", "HDL", "LDLCALC", "TRIG", "CHOLHDL" in the last 48 hours.  ,   Pathology Results  (Last 10 years)      None        , Troponin   No results for input(s): "TROPONINI" in the last 48 hours.  , and   Recent Lab Results         05/06/24  0751   05/06/24  0323   05/06/24  0322   05/05/24  1839        Albumin     4.0         ALP     90         ALT     60         Anion Gap     15   17       PTT     54.4  Comment: Refer to local heparin nomogram for intensity/dose specific   therapeutic   range.           AST     73         Baso #   0.09           Basophil %   0.8           BILIRUBIN TOTAL     1.0  Comment: For infants and newborns, interpretation of results should be based  on gestational age, weight and in agreement with clinical  observations.    Premature Infant recommended reference ranges:  Up to 24 hours.............<8.0 mg/dL  Up to 48 hours............<12.0 mg/dL  3-5 days..................<15.0 mg/dL  6-29 days.................<15.0 mg/dL           BUN     34   33       Calcium     10.7   10.9       Chloride     95   93       CO2     25   27       Creatinine     1.5   1.6       Differential Method   Automated           eGFR     >60.0   57.6       Eos #   1.1           Eos %   9.4           Glucose     104   153       Gran # (ANC)   7.2           Gran %   62.3           Group & Rh B POS             Hematocrit   53.3           Hemoglobin   17.1           Immature Grans (Abs)   0.04  Comment: Mild elevation in immature granulocytes is non specific and   can be seen in a variety of conditions including stress response,   acute inflammation, trauma and pregnancy. Correlation with other   laboratory and clinical findings is essential.             " Immature Granulocytes   0.3           INDIRECT WHITLEY NEG             Lymph #   2.0           Lymph %   17.0           Magnesium      2.2   2.3       MCH   27.5           MCHC   32.1           MCV   86           Mono #   1.2           Mono %   10.2           MPV   10.7           nRBC   0           Phosphorus Level     4.9         Platelet Count   303           Potassium     3.8   4.1       PROTEIN TOTAL     8.4         RBC   6.21           RDW   13.6           Sodium     135   137       Specimen Outdate 05/09/2024 23:59             WBC   11.50                   Significant Imaging:     Coronary Artery Angiogram with [facility]: (5/6/24)    Diagnostic  Dominance: Right    Left Main   The vessel was visualized by angiography, is moderate in size and is angiographically normal.      Left Anterior Descending   The vessel was visualized by angiography, is moderate in size and is angiographically normal.      Left Circumflex   The vessel was visualized by angiography, is moderate in size and is angiographically normal.      Right Coronary Artery   The vessel was visualized by angiography, is moderate in size and is angiographically normal. The vessel originates from the left coronary sinus.      Intervention     No interventions have been documented.     Left Heart Findings    Left Ventricle    LVEDP (Pre): 20 mmHG.     Recommendations     Routine post-cath care.   Continue medical management.     CV US Renal Artery Stenosis Hypertension (5/2/24)      There is insignificant stenosis (0-59%) in the Right Renal Artery.    Elevated right renal resistive index suggestive of intrinsic kidney disease.    Right kidney 10.00 cm.    There is insignificant stenosis (0-59%) in the Left Renal Artery.    Elevated left renal resistive index suggestive of intrinsic kidney disease.    Left kidney 10.10 cm.    This was a technically difficult study. This study was limited due to excessive bowel gas.    Echo (5/1/24 at 8:55 AM)      Left  Ventricle: Ventricular mass is normal. Mildly increased wall thickness. Severe global hypokinesis present. Septal motion is abnormal. There is severely reduced systolic function with a visually estimated ejection fraction of 15 - 20%. Ejection fraction by visual approximation is 18%. The biplane LVEF was much higher but not accurate due to technical issues. There is diastolic dysfunction but grade cannot be determined.    Right Ventricle: Systolic function is moderately reduced.    Right Atrium: Right atrium is moderately dilated.    IVC/SVC: Intermediate venous pressure at 8 mmHg.    Echo (5/1/24 at 1:38 PM)    IMPRESSION:  Patient admitted for suspected MI with poor LV function on initial echocardiogram-  Technically difficult acoustic windows.  Qualitative impression of at least moderately dilated right atrium  No obvious signs of previously reported dilation of the tricuspid annulus.  There is trivial to mild tricuspid insufficiency.  Mild right ventricle hypertrophy with at least moderate dilation.  Qualitative impression of moderate to severely reduced systolic function of the inlet and apical segments of the right ventricle with large outflow tract patch.  Right ventricular pressure estimated 16 mmHg above right atrial pressure from reasonably well defined TR doppler profile.  Echodensity consistent with patch repair of ventricular septal defect and malalignment of the ventricular septum with no residual shunt demonstrated.  Images consistent with Anne implant in pulmonary homograft with peak velocity <1.7 m/sec and mean <7 mm Hg.  No significant pulmonary insufficiency.  2D imaging demonstrates well-developed confluence pulmonary branches.  The left atrium appears normal in size with walls not well defined for measurement.  Trivial mitral valve insufficiency.  Qualitative impression of mild concentric left ventricular hypertrophy.  Flattened septal motion with decreased movement of the LV free wall, SF =  16% and EF qualitatively estimated 25 -30% from off axis apical views.  Trivial aortic valve insufficiency.  Aortic dimensions:  Sinuses of Valsalva = 38 mm.  ST junction              = 33 mm.  Ascending aorta      = 38 mm.  Normal size aortic arch with no evidence of coarctation.  No pericardial effusion.    Assessment and Plan:       * NSTEMI (non-ST elevated myocardial infarction)  NSTEMI - ISAI 3 (13%), WING 64 (1.2%)  - Young patient with congenital heart disease, no major atherosclerotic risk factors  - Atypical chest pain (burning and tightness on right side of the chest), EKG shows left sided ST-T wave changes possible for ischemia, and myocardial injury is evidenced by trop of 2.121 and 2.104 (at OSH 1.85). Nitro sublingual with mild improvement of symptoms    - Continue Telemetry to monitor for ventricular arrhythmias  - ACS protocol: Continue ASA 81mg qd, heparin gtt, atorvastatin 80mg qhs  - GDMT: Carvedilol  - Anti-ischemic medications: Nitrates sublingual and morphine PRN  - Possible PCI on Monday.pending improvement of renal function    VINOD (acute kidney injury)  - Dramatic drop in blood pressure and rise in creatine after restarting home BP meds medications  - He had previously reported taking all medications; however, suspect non compliance given his response to restarting these meds including entresto    - Holding entresto   - Decreased coreg given ADHF  - Monitor kidney function  - Avoid nephrotoxic medications    Adult congenital heart disease  1. 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   - epicardial left ventricular pacing wires in place.   3. 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 Mild pulmonary artery hypertension noted in the past.   5. Trace aortic insufficiency and mild aortic  root enlargement  6. Long history of decreased LV function - possibly exacerbated by high afterload.   - mildly improved with ejection fraction around 40-45% on echocardiogram 2023  7. Hypertension - poor compliance in the past with medications, still an issue  Strong family history of hypertension.    Discussion:     No history of fever, and by report CBC does not suggest acute infection, but he is definitely at risk for endocarditis given his Anne valve in the pulmonary position.  Echo last Wednesday with markedly worsened EF compared to last echo.  Repeat echo Thursday afternoon to better assess the PV, which is functioning very well with absolutely no evidence of worsening stenosis or endocarditis.   Pt's renal fx is improving with creatinine today at 1.5.   Angiogram today showed coronary arteries to be normal with LVEDP of 200 mmHg (possibly due to long standing HTN). Possible that chest pain last week was due to coronary spasms. Will discuss with adult interventional card team.  Pt already has his annual [facility] office visit with echo and EKG scheduled for 5/16 with Dr. Corona.    HFrEF (heart failure with reduced ejection fraction)  #HFrEF 25-30%, NYHA I, ACC/AHA class C:  - Etiology: Likely structural Heart Disease  - Hemodynamic profile: Perfused and hypervolemic  - Difficult compliance as per outpatient cardiology notes, probably taking meds only once a day, which was evidenced by improvement in BP with adequate doses of medications  - Home GDMT: Carvedilol 25mg bid, Entresto 97-103mg bid, spironolactone held when entresto was initiated    - Volume status optimization  - Furosemide 10mg/h, will repeat CXR and decide on switch to IV pushes dosing  - Daily monitoring: Standing weights, Strict I/O, Na restriction (<2g/day), Keep K>4 and Mg>2  - GDMT: Continue Carvedilol 3.125mg bid  - Holding entresto due to hypotension  - Not requiring vasodilator or inotropic support for now    Essential hypertension  - Poor  compliance in the past with medications as per cardiologist note  - Strong family history of hypertension.  - Outpatient on amlodipine 10mg, carvedilol 25mg bid, hydralazine 50mg bid and on entresto 97/103mg for HF       - Continue hydralazine 25mg bid  - Continue carvedilol 3.125mg bid        VTE Risk Mitigation (From admission, onward)           Ordered     IP VTE HIGH RISK PATIENT  Once         04/30/24 2351     Place sequential compression device  Until discontinued         04/30/24 6003                    Maura Harvey PA-C  Cardiology  [facility] - Cardiology Stepdown

## 2024-05-06 NOTE — SUBJECTIVE & OBJECTIVE
Interval History: No complaints, no chest pain.    Objective:     Vital Signs (Most Recent):  Temp: 97.5 °F (36.4 °C) (05/06/24 0529)  Pulse: 85 (05/06/24 0529)  Resp: 18 (05/06/24 0529)  BP: (!) 104/55 (05/06/24 0529)  SpO2: 97 % (05/06/24 0529) Vital Signs (24h Range):  Temp:  [97.5 °F (36.4 °C)-98.6 °F (37 °C)] 97.5 °F (36.4 °C)  Pulse:  [79-94] 85  Resp:  [18] 18  SpO2:  [97 %-98 %] 97 %  BP: (104-146)/(55-86) 104/55     Weight: 89.9 kg (198 lb 3.1 oz)  Body mass index is 31.99 kg/m².    SpO2: 97 %         Intake/Output Summary (Last 24 hours) at 5/6/2024 0742  Last data filed at 5/6/2024 0640  Gross per 24 hour   Intake 360 ml   Output 2000 ml   Net -1640 ml       Lines/Drains/Airways       Peripheral Intravenous Line  Duration                  Peripheral IV - Single Lumen 04/30/24 1903 20 G Right Antecubital 5 days         Peripheral IV - Single Lumen 04/30/24 2218 20 G Left Antecubital 5 days                     Physical Exam  Constitutional:       Appearance: Normal appearance.   Cardiovascular:      Heart sounds: Normal heart sounds. No murmur heard.     No gallop.      Comments:   2+ DP  2+ PT  2+ Radial  No carotid bruit  Pulmonary:      Effort: Pulmonary effort is normal.      Breath sounds: Normal breath sounds. No rales.   Musculoskeletal:      Right lower leg: No edema.      Left lower leg: No edema.   Skin:     General: Skin is warm and dry.   Neurological:      Mental Status: He is alert and oriented to person, place, and time. Mental status is at baseline.            Significant Labs: All pertinent lab results from the last 24 hours have been reviewed.    Significant Imaging:  Reviewed

## 2024-05-06 NOTE — ASSESSMENT & PLAN NOTE
- Poor compliance in the past with medications as per cardiologist note  - Strong family history of hypertension.  - Outpatient on amlodipine 10mg, carvedilol 25mg bid, hydralazine 50mg bid and on entresto 97/103mg for HF  - Patient had dramatic response to home medications when initiated inpatient, suspect this was evidence of poor home compliance    - Discontinued amlodipine (5/6) considering rEF  - Continue coreg 3.125 BID po. Will plan to uptitrate as tolerated tomorrow (5/7) as part of GDMT regimen  - will plan to initiate aldactone 25mg QD tomorrow (5/7) as part of GDMT regimen  - Continue hydralazine 25mg BID

## 2024-05-06 NOTE — PLAN OF CARE
[facility] - Cardiology Stepdown  Discharge Reassessment    Primary Care Provider: Sissy Lacy MD    Expected Discharge Date: 5/7/2024    Reassessment (most recent)       Discharge Reassessment - 05/06/24 1338          Discharge Reassessment    Assessment Type Discharge Planning Reassessment     Discharge Plan discussed with: Patient;Spouse/sig other;Parent(s)     Discharge Plan A Home with family     Discharge Plan B Home     DME Needed Upon Discharge  none     Transition of Care Barriers None     Why the patient remains in the hospital Requires continued medical care                   Discharge Plan A and Plan B have been determined by review of patient's clinical status, future medical and therapeutic needs, and coverage/benefits for post-acute care in coordination with multidisciplinary team members.  Will continue to follow.      Eulalia Alejandro LMSW  [facility]  [id number]

## 2024-05-06 NOTE — PROGRESS NOTES
[facility] - Cardiology Stepdown  Pediatric Cardiology  Progress Note    Patient Name: Thomas Horvath  MRN: [id number]  Admission Date: 4/30/2024  Hospital Length of Stay: 6 days  Code Status: Full Code   Attending Physician: Anand Keen MD   Primary Care Physician: Sissy Lacy MD  Expected Discharge Date: 5/7/2024  Principal Problem:NSTEMI (non-ST elevated myocardial infarction)    Subjective:     Interval History:    Pt in good spirits today after returning to the cardiac stepdown unit after cardiac cath. He is being visited by his mom, girlfriend Mariia, and two children (3 yo and 3 yo). Pt went to the cath lab this AM for a Coronary Artery Angiogram with [facility]. Procedure went well and all of pt's coronary arteries shown to be normal. Pt's LVEDP is 20 mmHg.    Pt complained yesterday (per nursing note) of generalized pain and muscle spasm, Magnesium and BPM repeated. Mg normal, BMP with Cl of 93 and Ca of 10.9. Pts renal fx continues to show improvement, with creatine at 1.5 today (BUN at 34, eGFR 57.6).    Pt denies all cardiac sx today. He states that he is experiencing no pain or muscle spasm and he denies chest pain, palpitations, SOB, or dizziness. Pt asking frequently when he will be able to go home. Reiterated the importance of him prioritizing cardiac care moving forward (and post-DC) by attending all out-patient appts and taking all home meds. Pt and his girlfriend state that he will get a daily medication container and use with [facility].    Past Medical History:   Diagnosis Date    Aortic insufficiency     trace    Homograft cardiac valve stenosis     mild    Hypertension     Left ventricular dysfunction     Pulmonary artery hypertension     RV hypertension    Right ventricular dilation     S/P TOF (tetralogy of Fallot) repair 12/18/2014    TOF (tetralogy of Fallot)        Past Surgical History:   Procedure Laterality Date    balloon dilation of pulmonary atrery homograft  2008    CARDIAC VALVE  SURGERY      7/23/15 @ [facility] [place]    epicardial pacing wires      LV    pulmonary valve replacement  2006    TETRALOGY OF FALLOT REPAIR         Review of patient's allergies indicates:  No Known Allergies    Current Facility-Administered Medications   Medication Dose Route Frequency Provider Last Rate Last Admin    acetaminophen tablet 650 mg  650 mg Oral Q8H PRN Kyler Alberto MD   650 mg at 24 1648    aspirin chewable tablet 81 mg  81 mg Oral Daily Kyler Alberto MD   81 mg at 24 0735    atorvastatin tablet 80 mg  80 mg Oral QHS Kyler Alberto MD   80 mg at 24 2108    carvediloL tablet 3.125 mg  3.125 mg Oral BID Jonatan Randall MD   3.125 mg at 24 0902    furosemide tablet 80 mg  80 mg Oral BID Jesus Caputo MD   80 mg at 24 1705    hydrALAZINE tablet 25 mg  25 mg Oral Q12H Jonatan Randall MD   25 mg at 24 0902    melatonin tablet 9 mg  9 mg Oral Nightly PRN Kyler Alberto MD   9 mg at 24 0357    sodium chloride 0.9% flush 10 mL  10 mL Intravenous PRN Kyler Alberto MD        sodium chloride 0.9% flush 10 mL  10 mL Intravenous PRN Gillies, Connor M, DO         Family History       Problem Relation (Age of Onset)    Hypertension Mother    No Known Problems Father, Sister, Brother, Maternal Grandmother, Maternal Grandfather, Paternal Grandmother, Paternal Grandfather, Maternal Aunt, Maternal Uncle, Paternal Aunt, Paternal Uncle          Tobacco Use    Smoking status: Former     Current packs/day: 0.00     Types: Cigarettes     Start date: 2006     Quit date: 2015     Years since quittin.9    Smokeless tobacco: Never    Tobacco comments:     marijuana   Substance and Sexual Activity    Alcohol use: No     Alcohol/week: 0.0 standard drinks of alcohol    Drug use: Yes     Frequency: 7.0 times per week     Types: Marijuana    Sexual activity: Yes     Partners: Female     Objective:      Vital Signs (Most Recent):  Temp: 98.3 °F (36.8 °C) (05/06/24 1321)  Pulse: 86 (05/06/24 1630)  Resp: 18 (05/06/24 1634)  BP: 115/65 (05/06/24 1608)  SpO2: 97 % (05/06/24 1608) Vital Signs (24h Range):  Temp:  [97.5 °F (36.4 °C)-98.6 °F (37 °C)] 98.3 °F (36.8 °C)  Pulse:  [83-99] 86  Resp:  [14-18] 18  SpO2:  [93 %-99 %] 97 %  BP: (104-153)/(55-90) 115/65     Weight: 89.9 kg (198 lb 3.1 oz)  Body mass index is 31.99 kg/m².    SpO2: 97 %         Intake/Output Summary (Last 24 hours) at 5/6/2024 1835  Last data filed at 5/6/2024 0923  Gross per 24 hour   Intake 480 ml   Output 1550 ml   Net -1070 ml       Lines/Drains/Airways       Peripheral Intravenous Line  Duration                  Peripheral IV - Single Lumen 04/30/24 1903 20 G Right Antecubital 5 days         Peripheral IV - Single Lumen 04/30/24 2218 20 G Left Antecubital 5 days                     Physical Exam  Constitutional:       General: He is not in acute distress.     Appearance: He is obese. He is not ill-appearing.   HENT:      Head: Normocephalic and atraumatic.   Eyes:      Extraocular Movements: Extraocular movements intact.      Pupils: Pupils are equal, round, and reactive to light.   Cardiovascular:      Rate and Rhythm: Normal rate and regular rhythm.      Pulses: Normal pulses.      Comments: No murmur heard on auscultation. Fixed & split S2 sound  Pulmonary:      Effort: Pulmonary effort is normal.   Abdominal:      General: There is no distension.      Palpations: Abdomen is soft.      Tenderness: There is no abdominal tenderness.   Musculoskeletal:      Cervical back: Normal range of motion and neck supple.   Skin:     General: Skin is warm.      Capillary Refill: Capillary refill takes less than 2 seconds.   Neurological:      General: No focal deficit present.      Mental Status: He is oriented to person, place, and time.   Psychiatric:         Mood and Affect: Mood normal.         Behavior: Behavior normal.          Significant Labs:  "  Recent Labs   Lab 05/05/24  0649 05/05/24  1839 05/06/24 0322    153* 104    137 135*   K 3.5 4.1 3.8   CL 96 93* 95   CO2 29 27 25   BUN 30* 33* 34*   CREATININE 1.5* 1.6* 1.5*   CALCIUM 10.5 10.9* 10.7*   MG 2.1 2.3 2.2   , CMP   Recent Labs   Lab 05/05/24  0649 05/05/24  1839 05/06/24 0322    137 135*   K 3.5 4.1 3.8   CL 96 93* 95   CO2 29 27 25    153* 104   BUN 30* 33* 34*   CREATININE 1.5* 1.6* 1.5*   CALCIUM 10.5 10.9* 10.7*   PROT 8.1  --  8.4   ALBUMIN 3.9  --  4.0   BILITOT 0.9  --  1.0   ALKPHOS 86  --  90   AST 53*  --  73*   ALT 35  --  60*   ANIONGAP 12 17* 15   , CBC   Recent Labs   Lab 05/05/24  0649 05/06/24 0323   WBC 11.98 11.50   HGB 16.6 17.1   HCT 52.7 53.3    303   , INR   No results for input(s): "INR", "PROTIME" in the last 48 hours.  , Lipid Panel   No results for input(s): "CHOL", "HDL", "LDLCALC", "TRIG", "CHOLHDL" in the last 48 hours.  ,   Pathology Results  (Last 10 years)      None        , Troponin   No results for input(s): "TROPONINI" in the last 48 hours.  , and   Recent Lab Results  (Last 5 results in the past 24 hours)        05/06/24  1426   05/06/24  0751   05/06/24 0323 05/06/24 0322 05/05/24 1839        Albumin       4.0         ALP       90         ALT       60         Anion Gap       15   17       PTT       54.4  Comment: Refer to local heparin nomogram for intensity/dose specific   therapeutic   range.           AST       73         Baso #     0.09           Basophil %     0.8           BILIRUBIN TOTAL       1.0  Comment: For infants and newborns, interpretation of results should be based  on gestational age, weight and in agreement with clinical  observations.    Premature Infant recommended reference ranges:  Up to 24 hours.............<8.0 mg/dL  Up to 48 hours............<12.0 mg/dL  3-5 days..................<15.0 mg/dL  6-29 days.................<15.0 mg/dL           BNP 73  Comment: Values of less than 100 pg/ml are " consistent with non-CHF populations.               BUN       34   33       Calcium       10.7   10.9       Chloride       95   93       CO2       25   27       Creatinine       1.5   1.6       Differential Method     Automated           eGFR       >60.0   57.6       Eos #     1.1           Eos %     9.4           Glucose       104   153       Gran # (ANC)     7.2           Gran %     62.3           Group & Rh   B POS             Hematocrit     53.3           Hemoglobin     17.1           Immature Grans (Abs)     0.04  Comment: Mild elevation in immature granulocytes is non specific and   can be seen in a variety of conditions including stress response,   acute inflammation, trauma and pregnancy. Correlation with other   laboratory and clinical findings is essential.             Immature Granulocytes     0.3           INDIRECT WHITLEY   NEG             Lymph #     2.0           Lymph %     17.0           Magnesium        2.2   2.3       MCH     27.5           MCHC     32.1           MCV     86           Mono #     1.2           Mono %     10.2           MPV     10.7           nRBC     0           Phosphorus Level       4.9         Platelet Count     303           Potassium       3.8   4.1       PROTEIN TOTAL       8.4         RBC     6.21           RDW     13.6           Sodium       135   137       Specimen Outdate   05/09/2024 23:59             WBC     11.50                                  Significant Imaging:     Coronary Artery Angiogram with [facility]: (5/6/24)    Diagnostic  Dominance: Right    Left Main   The vessel was visualized by angiography, is moderate in size and is angiographically normal.      Left Anterior Descending   The vessel was visualized by angiography, is moderate in size and is angiographically normal.      Left Circumflex   The vessel was visualized by angiography, is moderate in size and is angiographically normal.      Right Coronary Artery   The vessel was visualized by angiography, is  moderate in size and is angiographically normal. The vessel originates from the left coronary sinus.      Intervention     No interventions have been documented.     Left Heart Findings    Left Ventricle    LVEDP (Pre): 20 mmHG.     Recommendations     Routine post-cath care.   Continue medical management.     CV US Renal Artery Stenosis Hypertension (5/2/24)      There is insignificant stenosis (0-59%) in the Right Renal Artery.    Elevated right renal resistive index suggestive of intrinsic kidney disease.    Right kidney 10.00 cm.    There is insignificant stenosis (0-59%) in the Left Renal Artery.    Elevated left renal resistive index suggestive of intrinsic kidney disease.    Left kidney 10.10 cm.    This was a technically difficult study. This study was limited due to excessive bowel gas.    Echo (5/1/24 at 8:55 AM)      Left Ventricle: Ventricular mass is normal. Mildly increased wall thickness. Severe global hypokinesis present. Septal motion is abnormal. There is severely reduced systolic function with a visually estimated ejection fraction of 15 - 20%. Ejection fraction by visual approximation is 18%. The biplane LVEF was much higher but not accurate due to technical issues. There is diastolic dysfunction but grade cannot be determined.    Right Ventricle: Systolic function is moderately reduced.    Right Atrium: Right atrium is moderately dilated.    IVC/SVC: Intermediate venous pressure at 8 mmHg.    Echo (5/1/24 at 1:38 PM)    IMPRESSION:  Patient admitted for suspected MI with poor LV function on initial echocardiogram-  Technically difficult acoustic windows.  Qualitative impression of at least moderately dilated right atrium  No obvious signs of previously reported dilation of the tricuspid annulus.  There is trivial to mild tricuspid insufficiency.  Mild right ventricle hypertrophy with at least moderate dilation.  Qualitative impression of moderate to severely reduced systolic function of the  inlet and apical segments of the right ventricle with large outflow tract patch.  Right ventricular pressure estimated 16 mmHg above right atrial pressure from reasonably well defined TR doppler profile.  Echodensity consistent with patch repair of ventricular septal defect and malalignment of the ventricular septum with no residual shunt demonstrated.  Images consistent with Anne implant in pulmonary homograft with peak velocity <1.7 m/sec and mean <7 mm Hg.  No significant pulmonary insufficiency.  2D imaging demonstrates well-developed confluence pulmonary branches.  The left atrium appears normal in size with walls not well defined for measurement.  Trivial mitral valve insufficiency.  Qualitative impression of mild concentric left ventricular hypertrophy.  Flattened septal motion with decreased movement of the LV free wall, SF = 16% and EF qualitatively estimated 25 -30% from off axis apical views.  Trivial aortic valve insufficiency.  Aortic dimensions:  Sinuses of Valsalva = 38 mm.  ST junction              = 33 mm.  Ascending aorta      = 38 mm.  Normal size aortic arch with no evidence of coarctation.  No pericardial effusion.        Assessment and Plan:     Cardiac/Vascular  Adult congenital heart disease  1. 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   - epicardial left ventricular pacing wires in place.   3. 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 Mild pulmonary artery hypertension noted in the past.   5. Trace aortic insufficiency and mild aortic root enlargement  6. Long history of decreased LV function - possibly exacerbated by high afterload.   - moderately reduced LV systolic function, elevated LVEDP  - normal coronary arteries on cath 5/6/24  7. Hypertension - poor compliance in the past  with medications    Recommendations:  Agree with plans to advance carvedilol dose, hold amlodipine, likely start Aldactone tomorrow.  At home, he was on 25 mg of carvedilol twice a day and high-dose Entresto, but I really question his compliance given how much his blood pressure dropped when he was getting his home medications in the hospital.  We will need to reassess his echocardiogram as an outpatient once he is medically maximized for goal-directed medical therapy.  We need to strongly consider whether a defibrillator for primary prevention is indicated.  He has a significant risk factor in his LV dysfunction.        Ino Corona MD  Pediatric Cardiology  [facility]

## 2024-05-06 NOTE — PROGRESS NOTES
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 Echo showed normal daisy valve and EF of 30%. LHC 5/6/24 unremarkable for coronary artery disease; normal LHC.    Interval History: No acute issues overnight. //90 with a HR , In 360cc, Out 2000cc, net -1640L. Morning labs show Hb 17.1 and Cr 1.5. LHC today unremarkable for coronary artery disease. Some cath insertion site pain post procedure without swelling.    Review of Systems   Constitutional: Negative.   HENT: Negative.     Cardiovascular:  Negative for chest pain, claudication, cyanosis, dyspnea on exertion, irregular heartbeat, leg swelling, near-syncope, orthopnea, palpitations, paroxysmal nocturnal dyspnea and syncope.   Respiratory: Negative.     Endocrine: Negative.    Musculoskeletal: Negative.    Gastrointestinal: Negative.    Genitourinary: Negative.    Neurological: Negative.      Objective:     Vital Signs (Most Recent):  Temp: 98.3 °F (36.8 °C) (05/06/24 1321)  Pulse: 91 (05/06/24 1608)  Resp: 18 (05/06/24 1634)  BP: 115/65 (05/06/24 1608)  SpO2: 97 % (05/06/24 1608) Vital Signs (24h Range):  Temp:  [97.5 °F (36.4 °C)-98.6 °F (37 °C)] 98.3 °F (36.8 °C)  Pulse:  [83-99] 91  Resp:  [14-18] 18  SpO2:  [93 %-99 %] 97 %  BP: (104-153)/(55-90) 115/65     Weight: 89.9 kg (198 lb 3.1 oz)  Body mass index is 31.99 kg/m².     SpO2:  97 %         Intake/Output Summary (Last 24 hours) at 5/6/2024 1645  Last data filed at 5/6/2024 0923  Gross per 24 hour   Intake 480 ml   Output 2450 ml   Net -1970 ml       Lines/Drains/Airways       Peripheral Intravenous Line  Duration                  Peripheral IV - Single Lumen 04/30/24 1903 20 G Right Antecubital 5 days         Peripheral IV - Single Lumen 04/30/24 2218 20 G Left Antecubital 5 days                  Telemetry: NSR     Physical Exam  Vitals and nursing note reviewed.   Constitutional:       General: He is not in acute distress.     Appearance: Normal appearance. He is normal weight. He is not ill-appearing or diaphoretic.   HENT:      Head: Normocephalic and atraumatic.   Eyes:      Pupils: Pupils are equal, round, and reactive to light.   Cardiovascular:      Rate and Rhythm: Normal rate and regular rhythm.      Pulses: Normal pulses.      Heart sounds: Normal heart sounds.   Pulmonary:      Effort: Pulmonary effort is normal.      Breath sounds: Normal breath sounds.   Abdominal:      General: Abdomen is flat. Bowel sounds are normal. There is no distension.      Palpations: Abdomen is soft. There is no mass.      Tenderness: There is no abdominal tenderness.   Musculoskeletal:         General: Normal range of motion.   Skin:     General: Skin is warm.      Capillary Refill: Capillary refill takes less than 2 seconds.   Neurological:      General: No focal deficit present.      Mental Status: He is alert and oriented to person, place, and time.            Significant Labs:     Recent Labs   Lab 05/04/24  0329 05/05/24  0649 05/06/24  0323   WBC 12.22 11.98 11.50   HGB 16.7 16.6 17.1   HCT 53.6 52.7 53.3    318 303       Recent Labs   Lab 05/04/24  0329 05/05/24  0649 05/05/24  1839 05/06/24  0322    137 137 135*   K 3.9 3.5 4.1 3.8    96 93* 95   CO2 26 29 27 25   BUN 29* 30* 33* 34*   CREATININE 1.5* 1.5* 1.6* 1.5*   CALCIUM 10.2 10.5 10.9* 10.7*   PHOS 4.2 5.1*  --  4.9*  "      Recent Labs   Lab 05/04/24  0329 05/05/24  0649 05/06/24  0322   ALKPHOS 83 86 90   BILITOT 0.7 0.9 1.0   PROT 7.8 8.1 8.4   ALT 16 35 60*   AST 25 53* 73*       Recent Labs   Lab 04/30/24  2106   CHOL 158   TRIG 84   HDL 37*     Lab Results   Component Value Date    CHOL 158 04/30/2024    HDL 37 (L) 04/30/2024    LDLCALC 104.2 04/30/2024    TRIG 84 04/30/2024       Recent Labs   Lab 05/02/24  1743 05/02/24  2307 05/03/24  0508   TROPONINI 3.052* 3.055* 3.205*       No results found for: "HGBA1C"    Lab Results   Component Value Date    BNP 73 05/06/2024     (H) 04/30/2024    BNP 79 03/23/2023     Significant Imaging:      [facility] 5/6/24  All coronary arteries visualized angiographically are normal without evidence of coronary artery disease.    Renal US 05/02/2024    There is insignificant stenosis (0-59%) in the Right Renal Artery.    Elevated right renal resistive index suggestive of intrinsic kidney disease.    Right kidney 10.00 cm.    There is insignificant stenosis (0-59%) in the Left Renal Artery.    Elevated left renal resistive index suggestive of intrinsic kidney disease.    Left kidney 10.10 cm.    This was a technically difficult study. This study was limited due to excessive bowel gas.    TTE 05/01/2024  Technically difficult acoustic windows.  Qualitative impression of at least moderately dilated right atrium  No obvious signs of previously reported dilation of the tricuspid annulus.  There is trivial to mild tricuspid insufficiency.  Mild right ventricle hypertrophy with at least moderate dilation.  Qualitative impression of moderate to severely reduced systolic function of the inlet and apical segments of the right ventricle with large outflow tract patch.  Right ventricular pressure estimated 16 mmHg above right atrial pressure from reasonably well defined TR doppler profile.  Echodensity consistent with patch repair of ventricular septal defect and malalignment of the ventricular septum " with no residual shunt demonstrated.  Images consistent with Anne implant in pulmonary homograft with peak velocity <1.7 m/sec and mean <7 mm Hg.  No significant pulmonary insufficiency.  2D imaging demonstrates well-developed confluence pulmonary branches.  The left atrium appears normal in size with walls not well defined for measurement.  Trivial mitral valve insufficiency.  Qualitative impression of mild concentric left ventricular hypertrophy.  Flattened septal motion with decreased movement of the LV free wall, SF = 16% and EF qualitatively estimated 25 -30% from off axis apical views.  Trivial aortic valve insufficiency.  Aortic dimensions:  Sinuses of Valsalva = 38 mm.  ST junction              = 33 mm.  Ascending aorta      = 38 mm.  Normal size aortic arch with no evidence of coarctation.  No pericardial effusion.  Assessment and Plan:     * NSTEMI (non-ST elevated myocardial infarction)  NSTEMI - ISAI 3 (13%), WING 64 (1.2%)  - Young patient with congenital heart disease, no major atherosclerotic risk factors  - Atypical chest pain (burning and tightness on right side of the chest), EKG shows left sided ST-T wave changes possible for ischemia, and myocardial injury is evidenced by trop of 2.121 and 2.104 (at OSH 1.85). Nitro sublingual with mild improvement of symptoms  - [facility] (5/6/24) unremarkable for coronary artery disease.     - Continue Telemetry to monitor for ventricular arrhythmias  - Discontinuing ACS protocol considering clean heart cath  - Continue ASA 81mg qd, atorvastatin 80mg qhs  - GDMT: Carvedilol  - Anti-ischemic medications: Nitrates sublingual and morphine PRN    Obesity (BMI 30-39.9)  Body mass index is 31.99 kg/m². Morbid obesity complicates all aspects of disease management from diagnostic modalities to treatment. Weight loss encouraged and health benefits explained to patient.         VINOD (acute kidney injury)  - Dramatic drop in blood pressure and rise in creatine after  restarting home BP meds medications  - He had previously reported taking all medications; however, suspect non compliance given his response to restarting these meds including entresto    - Holding entresto   - Initially decreased coreg given ADHF. Will plan to increase now that patient is approaching euvolemia.  - Monitor kidney function  - Avoid nephrotoxic medications    Adult congenital heart disease  1. 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   - epicardial left ventricular pacing wires in place.   3. 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 Mild pulmonary artery hypertension noted in the past.   5. Trace aortic insufficiency and mild aortic root enlargement  6. Long history of decreased LV function - possibly exacerbated by high afterload.   - mildly improved with ejection fraction around 40-45% on echocardiogram 2023  7. Hypertension - poor compliance in the past with medications, still an issue  Strong family history of hypertension.    Discussion:     No history of fever, and by report CBC does not suggest acute infection, but he is definitely at risk for endocarditis given his Anne valve in the pulmonary position.  Echo last Wednesday with markedly worsened EF compared to last echo.  Repeat echo Thursday afternoon to better assess the PV, which is functioning very well with absolutely no evidence of worsening stenosis or endocarditis.   Pt's renal fx is improving with creatinine today at 1.5.   Angiogram today showed coronary arteries to be normal with LVEDP of 200 mmHg (possibly due to long standing HTN). Possible that chest pain last week was due to coronary spasms. Will discuss with adult interventional card team.  Pt already has his annual [facility] office visit with echo and EKG scheduled for  5/16 with Dr. Corona.    HFrEF (heart failure with reduced ejection fraction)  #HFrEF 25-30%, NYHA I, ACC/AHA class C:  - Etiology: Likely structural Heart Disease  - Hemodynamic profile: Perfused and hypervolemic  - Difficult compliance as per outpatient cardiology notes, probably taking meds only once a day, which was evidenced by improvement in BP with adequate doses of medications  - Home GDMT: Carvedilol 25mg bid, Entresto 97-103mg bid, spironolactone held when entresto was initiated    - Volume status optimization  - Lasix gtt discontinued as patient approaches euvolemia  - Will continue diuresis with lasix 80mg po BID  - Daily monitoring: Standing weights, Strict I/O, Na restriction (<2g/day), Keep K>4 and Mg>2  - GDMT: Continue Carvedilol 3.125mg bid (plan to uptitrate and initiate aldactone)  - Holding entresto due to hypotension  - Not requiring vasodilator or inotropic support for now    Essential hypertension  - Poor compliance in the past with medications as per cardiologist note  - Strong family history of hypertension.  - Outpatient on amlodipine 10mg, carvedilol 25mg bid, hydralazine 50mg bid and on entresto 97/103mg for HF  - Patient had dramatic response to home medications when initiated inpatient, suspect this was evidence of poor home compliance    - Discontinued amlodipine (5/6) considering rEF  - Continue coreg 3.125 BID po. Will plan to uptitrate as tolerated tomorrow (5/7) as part of GDMT regimen  - will plan to initiate aldactone 25mg QD tomorrow (5/7) as part of GDMT regimen  - Continue hydralazine 25mg BID        VTE Risk Mitigation (From admission, onward)           Ordered     IP VTE HIGH RISK PATIENT  Once         04/30/24 2357     Place sequential compression device  Until discontinued         04/30/24 5583                    Jesus Caputo MD  Cardiology  [facility] - Cardiology Stepdown

## 2024-05-06 NOTE — ASSESSMENT & PLAN NOTE
NSTEMI - ISAI 3 (13%), WING 64 (1.2%)  - Young patient with congenital heart disease, no major atherosclerotic risk factors  - Atypical chest pain (burning and tightness on right side of the chest), EKG shows left sided ST-T wave changes possible for ischemia, and myocardial injury is evidenced by trop of 2.121 and 2.104 (at OSH 1.85). Nitro sublingual with mild improvement of symptoms  - [facility] (5/6/24) unremarkable for coronary artery disease.     - Continue Telemetry to monitor for ventricular arrhythmias  - Discontinuing ACS protocol considering clean heart cath  - Continue ASA 81mg qd, atorvastatin 80mg qhs  - GDMT: Carvedilol  - Anti-ischemic medications: Nitrates sublingual and morphine PRN

## 2024-05-06 NOTE — CARE UPDATE
I have reviewed the chart of Thomas YAN Horvath who is hospitalized for the following:    Active Hospital Problems    Diagnosis    *NSTEMI (non-ST elevated myocardial infarction)    Obesity (BMI 30-39.9)    VINOD (acute kidney injury)    Adult congenital heart disease    HFrEF (heart failure with reduced ejection fraction)     POA  Acute on Chronic      Essential hypertension        Brittany Serna NP  Unit Based [facility]

## 2024-05-07 VITALS
DIASTOLIC BLOOD PRESSURE: 70 MMHG | BODY MASS INDEX: 31.07 KG/M2 | SYSTOLIC BLOOD PRESSURE: 111 MMHG | RESPIRATION RATE: 20 BRPM | HEART RATE: 86 BPM | TEMPERATURE: 98 F | WEIGHT: 193.31 LBS | OXYGEN SATURATION: 96 % | HEIGHT: 66 IN

## 2024-05-07 LAB
ALBUMIN SERPL BCP-MCNC: 3.6 G/DL (ref 3.5–5.2)
ALP SERPL-CCNC: 86 U/L (ref 55–135)
ALT SERPL W/O P-5'-P-CCNC: 56 U/L (ref 10–44)
ANION GAP SERPL CALC-SCNC: 13 MMOL/L (ref 8–16)
APTT PPP: 29.7 SEC (ref 21–32)
AST SERPL-CCNC: 54 U/L (ref 10–40)
BASOPHILS # BLD AUTO: 0.09 K/UL (ref 0–0.2)
BASOPHILS NFR BLD: 0.8 % (ref 0–1.9)
BILIRUB SERPL-MCNC: 0.9 MG/DL (ref 0.1–1)
BUN SERPL-MCNC: 30 MG/DL (ref 6–20)
CALCIUM SERPL-MCNC: 10.1 MG/DL (ref 8.7–10.5)
CHLORIDE SERPL-SCNC: 93 MMOL/L (ref 95–110)
CO2 SERPL-SCNC: 29 MMOL/L (ref 23–29)
CREAT SERPL-MCNC: 1.4 MG/DL (ref 0.5–1.4)
DIFFERENTIAL METHOD BLD: ABNORMAL
EOSINOPHIL # BLD AUTO: 1.2 K/UL (ref 0–0.5)
EOSINOPHIL NFR BLD: 10.2 % (ref 0–8)
ERYTHROCYTE [DISTWIDTH] IN BLOOD BY AUTOMATED COUNT: 13.6 % (ref 11.5–14.5)
EST. GFR  (NO RACE VARIABLE): >60 ML/MIN/1.73 M^2
GLUCOSE SERPL-MCNC: 87 MG/DL (ref 70–110)
HCT VFR BLD AUTO: 50.9 % (ref 40–54)
HGB BLD-MCNC: 16.1 G/DL (ref 14–18)
IMM GRANULOCYTES # BLD AUTO: 0.05 K/UL (ref 0–0.04)
IMM GRANULOCYTES NFR BLD AUTO: 0.4 % (ref 0–0.5)
LYMPHOCYTES # BLD AUTO: 1.9 K/UL (ref 1–4.8)
LYMPHOCYTES NFR BLD: 16.5 % (ref 18–48)
MAGNESIUM SERPL-MCNC: 2.2 MG/DL (ref 1.6–2.6)
MCH RBC QN AUTO: 27.4 PG (ref 27–31)
MCHC RBC AUTO-ENTMCNC: 31.6 G/DL (ref 32–36)
MCV RBC AUTO: 87 FL (ref 82–98)
MONOCYTES # BLD AUTO: 1.6 K/UL (ref 0.3–1)
MONOCYTES NFR BLD: 13.5 % (ref 4–15)
NEUTROPHILS # BLD AUTO: 6.8 K/UL (ref 1.8–7.7)
NEUTROPHILS NFR BLD: 58.6 % (ref 38–73)
NRBC BLD-RTO: 0 /100 WBC
PHOSPHATE SERPL-MCNC: 4.2 MG/DL (ref 2.7–4.5)
PLATELET # BLD AUTO: 289 K/UL (ref 150–450)
PMV BLD AUTO: 11.1 FL (ref 9.2–12.9)
POTASSIUM SERPL-SCNC: 3.7 MMOL/L (ref 3.5–5.1)
PROT SERPL-MCNC: 7.5 G/DL (ref 6–8.4)
RBC # BLD AUTO: 5.87 M/UL (ref 4.6–6.2)
SODIUM SERPL-SCNC: 135 MMOL/L (ref 136–145)
WBC # BLD AUTO: 11.59 K/UL (ref 3.9–12.7)

## 2024-05-07 PROCEDURE — 99239 HOSP IP/OBS DSCHRG MGMT >30: CPT | Mod: ,,, | Performed by: INTERNAL MEDICINE

## 2024-05-07 PROCEDURE — 80053 COMPREHEN METABOLIC PANEL: CPT | Performed by: STUDENT IN AN ORGANIZED HEALTH CARE EDUCATION/TRAINING PROGRAM

## 2024-05-07 PROCEDURE — 85730 THROMBOPLASTIN TIME PARTIAL: CPT | Performed by: STUDENT IN AN ORGANIZED HEALTH CARE EDUCATION/TRAINING PROGRAM

## 2024-05-07 PROCEDURE — 25000003 PHARM REV CODE 250: Performed by: STUDENT IN AN ORGANIZED HEALTH CARE EDUCATION/TRAINING PROGRAM

## 2024-05-07 PROCEDURE — 83735 ASSAY OF MAGNESIUM: CPT | Performed by: STUDENT IN AN ORGANIZED HEALTH CARE EDUCATION/TRAINING PROGRAM

## 2024-05-07 PROCEDURE — 84100 ASSAY OF PHOSPHORUS: CPT | Performed by: STUDENT IN AN ORGANIZED HEALTH CARE EDUCATION/TRAINING PROGRAM

## 2024-05-07 PROCEDURE — 85025 COMPLETE CBC W/AUTO DIFF WBC: CPT | Performed by: STUDENT IN AN ORGANIZED HEALTH CARE EDUCATION/TRAINING PROGRAM

## 2024-05-07 PROCEDURE — 99231 SBSQ HOSP IP/OBS SF/LOW 25: CPT | Mod: ,,, | Performed by: PEDIATRICS

## 2024-05-07 PROCEDURE — 25000003 PHARM REV CODE 250

## 2024-05-07 RX ORDER — ATORVASTATIN CALCIUM 80 MG/1
80 TABLET, FILM COATED ORAL NIGHTLY
Qty: 30 TABLET | Refills: 3 | Status: SHIPPED | OUTPATIENT
Start: 2024-05-07 | End: 2025-05-07

## 2024-05-07 RX ORDER — CARVEDILOL 6.25 MG/1
6.25 TABLET ORAL 2 TIMES DAILY
Qty: 180 TABLET | Refills: 3 | Status: SHIPPED | OUTPATIENT
Start: 2024-05-07 | End: 2025-05-07

## 2024-05-07 RX ORDER — CARVEDILOL 6.25 MG/1
6.25 TABLET ORAL 2 TIMES DAILY
Status: DISCONTINUED | OUTPATIENT
Start: 2024-05-07 | End: 2024-05-07 | Stop reason: HOSPADM

## 2024-05-07 RX ORDER — HYDRALAZINE HYDROCHLORIDE 25 MG/1
25 TABLET, FILM COATED ORAL EVERY 12 HOURS
Qty: 60 TABLET | Refills: 11 | Status: SHIPPED | OUTPATIENT
Start: 2024-05-07 | End: 2025-05-07

## 2024-05-07 RX ORDER — NAPROXEN SODIUM 220 MG/1
81 TABLET, FILM COATED ORAL DAILY
Qty: 60 TABLET | Refills: 3 | Status: SHIPPED | OUTPATIENT
Start: 2024-05-08 | End: 2025-05-08

## 2024-05-07 RX ORDER — SPIRONOLACTONE 25 MG/1
25 TABLET ORAL DAILY
Qty: 30 TABLET | Refills: 11 | Status: SHIPPED | OUTPATIENT
Start: 2024-05-08 | End: 2024-06-05

## 2024-05-07 RX ORDER — SPIRONOLACTONE 25 MG/1
25 TABLET ORAL DAILY
Status: DISCONTINUED | OUTPATIENT
Start: 2024-05-07 | End: 2024-05-07 | Stop reason: HOSPADM

## 2024-05-07 RX ORDER — FUROSEMIDE 80 MG/1
80 TABLET ORAL 2 TIMES DAILY
Qty: 60 TABLET | Refills: 11 | Status: SHIPPED | OUTPATIENT
Start: 2024-05-07 | End: 2024-06-19 | Stop reason: SDUPTHER

## 2024-05-07 RX ADMIN — CARVEDILOL 6.25 MG: 6.25 TABLET, FILM COATED ORAL at 09:05

## 2024-05-07 RX ADMIN — ACETAMINOPHEN 650 MG: 325 TABLET ORAL at 09:05

## 2024-05-07 RX ADMIN — POTASSIUM BICARBONATE 40 MEQ: 391 TABLET, EFFERVESCENT ORAL at 09:05

## 2024-05-07 RX ADMIN — ASPIRIN 81 MG CHEWABLE TABLET 81 MG: 81 TABLET CHEWABLE at 09:05

## 2024-05-07 RX ADMIN — HYDRALAZINE HYDROCHLORIDE 25 MG: 25 TABLET, FILM COATED ORAL at 09:05

## 2024-05-07 RX ADMIN — SPIRONOLACTONE 25 MG: 25 TABLET ORAL at 09:05

## 2024-05-07 RX ADMIN — FUROSEMIDE 80 MG: 80 TABLET ORAL at 09:05

## 2024-05-07 NOTE — CARE UPDATE
Unit [facility] Care Support Interaction      I have reviewed the chart of Thomas Horvath who is hospitalized for NSTEMI (non-ST elevated myocardial infarction). The patient is currently located in the following unit: CSU       I have seen and examined the patient and provided the following support:     Patient experience rounds - positive experience reported    Overall patient has had a good experience with nursing staff. Patient reports delay in answering call light during night shift. Escalated to Charge Nurse.    Patient reports waiting on his ride, his mom, who will not be able to pick him up until after 6pm after she gets off work. Therefore he is not a candidate for the DC suite due to ride coming after 6:30pm.       Brittany Serna NP  Unit Based [facility]

## 2024-05-07 NOTE — PLAN OF CARE
Patient is stable, vital signs normal, no chest pain, reviewed discharge paperwork, removed peripheral IV's and meds have been delivered by pharmacy. OK to discharge - getting a ride home from his brother.

## 2024-05-07 NOTE — ASSESSMENT & PLAN NOTE
1. 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   - epicardial left ventricular pacing wires in place.   3. 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 Mild pulmonary artery hypertension noted in the past.   5. Trace aortic insufficiency and mild aortic root enlargement  6. Long history of decreased LV function - possibly exacerbated by high afterload.   - moderately reduced LV systolic function, elevated LVEDP  - normal coronary arteries on cath 5/6/24  7. Hypertension - poor compliance in the past with medications     Recommendations:  Agree with plans to advance carvedilol dose, hold amlodipine, and to start Aldactone.  At home, he was on 25 mg of carvedilol twice a day and high-dose Entresto, but I really question his compliance given how much his blood pressure dropped when he was getting his home medications in the hospital.  We will need to reassess his echocardiogram as an outpatient once he is medically maximized for goal-directed medical therapy.  We need to strongly consider whether a defibrillator for primary prevention is indicated.  He has a significant risk factor in his LV dysfunction.    NOTE: (per Dr. Lee):    Coreg was gently started with uptitration to 6.25 BID and aldactone 25 was started as well. Hydralazine was addeded at 25mg BID. Patient will need to be uptitrated to adequate GDMT with SGLT2 inhib and ARNI after discharge. Patient was diuresed with resolution of elevations in BNP; discharging with continued lasix po 80mg. Patient to follow up with Dr. Corona in Adult Congenital Heart Disease as well as with PCP.

## 2024-05-07 NOTE — DISCHARGE SUMMARY
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     Per patient chest pain is on the right side, started 2-3 days prior to admission while he was lying down, waxing and weaning but always present, described as tightness and burning, not improving or worsening by anything at home, and at the hospital mild improvement with nitro. He went to OSH were elevated troponin was seen and was diagnosed with NSTEMI, given ASA loading dose and transferred for interventional cardiology evaluation. He also complained of cough for the last week, but denies low extremity swelling, orthopnea, PND, palpitations, syncope, and is not sure if he is having shortness of breath or not.     Admitted to CCU for close follow overnight.     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 Echo showed normal daisy valve and EF of 30%. LHC 5/6/24 unremarkable for coronary artery disease; normal LHC. Upon initiation of prescribed home GDMT, patient became hypotensive, likely indiciative of poor home med compliance. Coreg was gently started with uptitration to 6.25 BID and aldactone 25 was started as well. Hydralazine was added at 25mg BID. Patient will need to be uptitrated to adequate GDMT with SGLT2 inhib and ARNI after discharge. Patient was diuresed with resolution BNP elevation; discharging with continued lasix po 80mg. Patient to follow up with Dr. Corona in Adult Congenital Heart Disease as well as with PCP.    Goals of Care Treatment Preferences:  Code Status: Full Code    Objective:  /70 (BP Location: Right arm, Patient Position: Lying)   Pulse 86   Temp 98.3 °F (36.8 °C) (Oral)   Resp 20   Ht 5' 6" (1.676 m)   Wt 87.7 kg (193 lb 5.5 oz)   SpO2 96%   BMI 31.21 kg/m²     Physical Exam  Vitals and nursing note reviewed.   Constitutional:       General: He is not in acute distress.     Appearance: Normal appearance. He is normal weight. He is not ill-appearing or diaphoretic.   HENT:      Head: Normocephalic and atraumatic.   Eyes:      Pupils: Pupils are equal, round, and reactive to light.   Cardiovascular:      Rate and Rhythm: Normal rate and regular rhythm.      Pulses: Normal pulses.      Heart sounds: Normal heart sounds.   Pulmonary:      Effort: Pulmonary effort is normal.      Breath sounds: Normal breath sounds.   Abdominal:      General: Abdomen is flat. Bowel sounds are normal. There is no distension.      Palpations: Abdomen is soft. There is no mass.      Tenderness: There is no abdominal tenderness.   Musculoskeletal:         General: Normal range of motion.   Skin:     General: Skin is warm.      Capillary Refill: Capillary refill " takes less than 2 seconds.   Neurological:      General: No focal deficit present.      Mental Status: He is alert and oriented to person, place, and time.     Consults:   Consults (From admission, onward)          Status Ordering Provider     Inpatient consult to Interventional Cardiology  Once        Provider:  (Not yet assigned)    Completed KARMA CANCINO     Inpatient consult to Adult Congenital Heart Disease  Once        Provider:  (Not yet assigned)    Completed ADAM HOFFMAN     Inpatient consult to Cardiology  Once        Provider:  (Not yet assigned)    Completed ADAM HOFFMAN            Significant Diagnostic Studies: Labs: CMP   Recent Labs   Lab 05/05/24  1839 05/06/24  0322 05/07/24  0411    135* 135*   K 4.1 3.8 3.7   CL 93* 95 93*   CO2 27 25 29   * 104 87   BUN 33* 34* 30*   CREATININE 1.6* 1.5* 1.4   CALCIUM 10.9* 10.7* 10.1   PROT  --  8.4 7.5   ALBUMIN  --  4.0 3.6   BILITOT  --  1.0 0.9   ALKPHOS  --  90 86   AST  --  73* 54*   ALT  --  60* 56*   ANIONGAP 17* 15 13   , CBC   Recent Labs   Lab 05/06/24  0323 05/07/24  0411   WBC 11.50 11.59   HGB 17.1 16.1   HCT 53.3 50.9    289   , Lipid Panel   Lab Results   Component Value Date    CHOL 158 04/30/2024    HDL 37 (L) 04/30/2024    LDLCALC 104.2 04/30/2024    TRIG 84 04/30/2024    CHOLHDL 23.4 04/30/2024   , Troponin   Recent Labs   Lab 05/02/24  1743 05/02/24  2307 05/03/24  0508   TROPONINI 3.052* 3.055* 3.205*   , and All labs within the past 24 hours have been reviewed    Significant Imaging:      [facility] 5/6/24  All coronary arteries visualized angiographically are normal without evidence of coronary artery disease.     Renal US 05/02/2024    There is insignificant stenosis (0-59%) in the Right Renal Artery.    Elevated right renal resistive index suggestive of intrinsic kidney disease.    Right kidney 10.00 cm.    There is insignificant stenosis (0-59%) in the Left Renal Artery.    Elevated left  renal resistive index suggestive of intrinsic kidney disease.    Left kidney 10.10 cm.    This was a technically difficult study. This study was limited due to excessive bowel gas.     TTE 05/01/2024  Technically difficult acoustic windows.  Qualitative impression of at least moderately dilated right atrium  No obvious signs of previously reported dilation of the tricuspid annulus.  There is trivial to mild tricuspid insufficiency.  Mild right ventricle hypertrophy with at least moderate dilation.  Qualitative impression of moderate to severely reduced systolic function of the inlet and apical segments of the right ventricle with large outflow tract patch.  Right ventricular pressure estimated 16 mmHg above right atrial pressure from reasonably well defined TR doppler profile.  Echodensity consistent with patch repair of ventricular septal defect and malalignment of the ventricular septum with no residual shunt demonstrated.  Images consistent with Anne implant in pulmonary homograft with peak velocity <1.7 m/sec and mean <7 mm Hg.  No significant pulmonary insufficiency.  2D imaging demonstrates well-developed confluence pulmonary branches.  The left atrium appears normal in size with walls not well defined for measurement.  Trivial mitral valve insufficiency.  Qualitative impression of mild concentric left ventricular hypertrophy.  Flattened septal motion with decreased movement of the LV free wall, SF = 16% and EF qualitatively estimated 25 -30% from off axis apical views.  Trivial aortic valve insufficiency.  Aortic dimensions:  Sinuses of Valsalva = 38 mm.  ST junction              = 33 mm.  Ascending aorta      = 38 mm.  Normal size aortic arch with no evidence of coarctation.  No pericardial effusion.  Pending Diagnostic Studies:       None            Final Active Diagnoses:    Diagnosis Date Noted POA    PRINCIPAL PROBLEM:  NSTEMI (non-ST elevated myocardial infarction) [I21.4] 05/01/2024 Yes    Obesity  (BMI 30-39.9) [E66.9] 05/06/2024 Yes    VINOD (acute kidney injury) [N17.9] 05/02/2024 Yes    Adult congenital heart disease [Q24.9] 06/07/2021 Not Applicable    HFrEF (heart failure with reduced ejection fraction) [I50.20] 09/09/2015 Yes    Essential hypertension [I10] 04/28/2015 Yes      Problems Resolved During this Admission:     No new Assessment & Plan notes have been filed under this hospital service since the last note was generated.  Service: Cardiology      Discharged Condition: stable    Disposition: Home or Self Care    Follow Up:   Follow-up Information       Ino Corona MD. Schedule an appointment as soon as possible for a visit in 1 week(s).    Specialties: Pediatric Cardiology, Cardiology  Contact information:  [address]  [facility] [address]  [phone]                           Patient Instructions:      Ambulatory referral/consult to Pediatric Cardiology   Standing Status: Future   Referral Priority: Routine Referral Type: Consultation   Referral Reason: Specialty Services Required   Requested Specialty: Pediatric Cardiology   Number of Visits Requested: 1     Medications:  Reconciled Home Medications:      Medication List        START taking these medications      aspirin 81 MG Chew  Chew and swallow 1 tablet (81 mg total) by mouth once daily.  Start taking on: May 8, 2024     atorvastatin 80 MG tablet  Commonly known as: LIPITOR  Take 1 tablet (80 mg total) by mouth every evening.     furosemide 80 MG tablet  Commonly known as: LASIX  Take 1 tablet (80 mg total) by mouth 2 (two) times daily.     hydrALAZINE 25 MG tablet  Commonly known as: APRESOLINE  Take 1 tablet (25 mg total) by mouth every 12 (twelve) hours.     spironolactone 25 MG tablet  Commonly known as: ALDACTONE  Take 1 tablet (25 mg total) by mouth once daily.  Start taking on: May 8, 2024            CHANGE how you take these medications      carvediloL 6.25 MG tablet  Commonly known as: COREG  Take 1 tablet (6.25 mg  total) by mouth 2 (two) times daily.  What changed:   medication strength  how much to take              Time spent on the discharge of patient: 35 minutes    Jesus Caputo MD  Cardiology  [facility] - Cardiology Stepdown

## 2024-05-07 NOTE — PLAN OF CARE
[facility] - Cardiology Stepdown  Discharge Final Note    Primary Care Provider: Sissy Lacy MD    Expected Discharge Date: 5/7/2024    Final Discharge Note (most recent)       Final Note - 05/07/24 1526          Final Note    Assessment Type Final Discharge Note     Anticipated Discharge Disposition Home or Self Care                   Per CCU team the only follow-up appt needed at discharge is with congential cardiology, who will schedule their own appt.      Eulalia Alejandro LMSW [facility]  [id number]      Future Appointments   Date Time Provider Department Center   5/16/2024 12:30 PM EKG, APPT [facility] EKG [facility]   5/16/2024  1:00 PM ECHO, [facility] NOM ECHOSTR [facility]   5/16/2024  2:00 PM Ino Corona MD [facility] CON [facility]         Contact Info       Ino Corona MD   Specialty: Pediatric Cardiology, Cardiology    [address]  [facility] [address]   Phone: [phone]       Next Steps: Schedule an appointment as soon as possible for a visit in 1 week(s)

## 2024-05-07 NOTE — SUBJECTIVE & OBJECTIVE
"Interval History:    Pt continues to feel better and denied all cardiac sx today during interview/PE.Pt states that he was told that he will likely be discharged today (or tomorrow at the latest.) Pt is ready for discharge and looking forward to going home.    Asked pt what his current understanding is of his condition and he states that his heart is "fine". Spoke with pt extensively about his current diagnosis of HFrEF and the importance of remaining compliant with all HF medications after discharge. Pt communicated his understanding of this and intends to do so. Discharge plan is for pt to follow up with Dr. Corona in the [facility] out-patient clinic as well as with PCP.    Past Medical History:   Diagnosis Date    Aortic insufficiency     trace    Homograft cardiac valve stenosis     mild    Hypertension     Left ventricular dysfunction     Pulmonary artery hypertension     RV hypertension    Right ventricular dilation     S/P TOF (tetralogy of Fallot) repair 12/18/2014    TOF (tetralogy of Fallot)        Past Surgical History:   Procedure Laterality Date    ANGIOGRAM, CORONARY, WITH LEFT HEART CATHETERIZATION  5/6/2024    Procedure: Angiogram, Coronary, with Left Heart Cath;  Surgeon: Anand Keen MD;  Location: [facility] CATH LAB;  Service: Cardiology;;    balloon dilation of pulmonary atrery homograft  2008    CARDIAC VALVE SURGERY      7/23/15 @ [facility]    CORONARY ANGIOGRAPHY Right 5/6/2024    Procedure: ANGIOGRAM, CORONARY ARTERY;  Surgeon: Anand Keen MD;  Location: [facility] CATH LAB;  Service: Cardiology;  Laterality: Right;    epicardial pacing wires      LV    pulmonary valve replacement  July 7, 2006    TETRALOGY OF FALLOT REPAIR  1990       Review of patient's allergies indicates:  No Known Allergies    No current facility-administered medications on file prior to encounter.     Current Outpatient Medications on File Prior to Encounter   Medication Sig    [DISCONTINUED] amLODIPine (NORVASC) 10 MG tablet " Take 1 tablet (10 mg total) by mouth once daily.    [DISCONTINUED] aspirin 81 MG Chew Take 1 tablet (81 mg total) by mouth once daily.    [DISCONTINUED] carvediloL (COREG) 25 MG tablet Take 1 tablet (25 mg total) by mouth 2 (two) times daily.    [DISCONTINUED] digoxin (LANOXIN) 125 mcg tablet Take 1 tablet (125 mcg total) by mouth once daily.    [DISCONTINUED] hydrALAZINE (APRESOLINE) 50 MG tablet Take 1 tablet (50 mg total) by mouth every 12 (twelve) hours.    [DISCONTINUED] ibuprofen (ADVIL,MOTRIN) 800 MG tablet Take 800 mg by mouth every 6 (six) hours as needed.    [DISCONTINUED] rosuvastatin (CRESTOR) 5 MG tablet Take 1 tablet (5 mg total) by mouth once daily.    [DISCONTINUED] sacubitriL-valsartan (ENTRESTO)  mg per tablet Take 1 tablet by mouth 2 (two) times daily.    [DISCONTINUED] spironolactone (ALDACTONE) 25 MG tablet Take 1 tablet (25 mg total) by mouth once daily.     Family History       Problem Relation (Age of Onset)    Hypertension Mother    No Known Problems Father, Sister, Brother, Maternal Grandmother, Maternal Grandfather, Paternal Grandmother, Paternal Grandfather, Maternal Aunt, Maternal Uncle, Paternal Aunt, Paternal Uncle          Tobacco Use    Smoking status: Former     Current packs/day: 0.00     Types: Cigarettes     Start date: 2006     Quit date: 2015     Years since quittin.9    Smokeless tobacco: Never    Tobacco comments:     marijuana   Substance and Sexual Activity    Alcohol use: No     Alcohol/week: 0.0 standard drinks of alcohol    Drug use: Yes     Frequency: 7.0 times per week     Types: Marijuana    Sexual activity: Yes     Partners: Female     Objective:     Vital Signs (Most Recent):  Temp: 98.3 °F (36.8 °C) (24 1139)  Pulse: 93 (24 1139)  Resp: 20 (24 113)  BP: 109/65 (24 113)  SpO2: (!) 93 % (24 113) Vital Signs (24h Range):  Temp:  [97.3 °F (36.3 °C)-98.3 °F (36.8 °C)] 98.3 °F (36.8 °C)  Pulse:  [85-98] 93  Resp:   [16-20] 20  SpO2:  [93 %-98 %] 93 %  BP: (104-135)/(62-91) 109/65     Weight: 87.7 kg (193 lb 5.5 oz)  Body mass index is 31.21 kg/m².    SpO2: (!) 93 %         Intake/Output Summary (Last 24 hours) at 5/7/2024 1401  Last data filed at 5/7/2024 0800  Gross per 24 hour   Intake 600 ml   Output 500 ml   Net 100 ml       Lines/Drains/Airways       Peripheral Intravenous Line  Duration                  Peripheral IV - Single Lumen 04/30/24 1903 20 G Right Antecubital 6 days         Peripheral IV - Single Lumen 04/30/24 2218 20 G Left Antecubital 6 days                     Physical Exam  Constitutional:       General: He is not in acute distress.     Appearance: He is obese. He is not ill-appearing.   HENT:      Head: Normocephalic and atraumatic.   Eyes:      Extraocular Movements: Extraocular movements intact.      Pupils: Pupils are equal, round, and reactive to light.   Cardiovascular:      Rate and Rhythm: Normal rate and regular rhythm.      Pulses: Normal pulses.      Comments: No murmur heard on auscultation. Fixed & split S2 sound  Pulmonary:      Effort: Pulmonary effort is normal.   Abdominal:      General: There is no distension.      Palpations: Abdomen is soft.      Tenderness: There is no abdominal tenderness.   Musculoskeletal:      Cervical back: Normal range of motion and neck supple.   Skin:     General: Skin is warm.      Capillary Refill: Capillary refill takes less than 2 seconds.   Neurological:      General: No focal deficit present.      Mental Status: He is oriented to person, place, and time.   Psychiatric:         Mood and Affect: Mood normal.         Behavior: Behavior normal.          Significant Labs:   Recent Labs   Lab 05/05/24  1839 05/06/24  0322 05/07/24  0411   * 104 87    135* 135*   K 4.1 3.8 3.7   CL 93* 95 93*   CO2 27 25 29   BUN 33* 34* 30*   CREATININE 1.6* 1.5* 1.4   CALCIUM 10.9* 10.7* 10.1   MG 2.3 2.2 2.2   , CMP   Recent Labs   Lab 05/05/24  1839  "05/06/24 0322 05/07/24 0411    135* 135*   K 4.1 3.8 3.7   CL 93* 95 93*   CO2 27 25 29   * 104 87   BUN 33* 34* 30*   CREATININE 1.6* 1.5* 1.4   CALCIUM 10.9* 10.7* 10.1   PROT  --  8.4 7.5   ALBUMIN  --  4.0 3.6   BILITOT  --  1.0 0.9   ALKPHOS  --  90 86   AST  --  73* 54*   ALT  --  60* 56*   ANIONGAP 17* 15 13   , CBC   Recent Labs   Lab 05/06/24 0323 05/07/24 0411   WBC 11.50 11.59   HGB 17.1 16.1   HCT 53.3 50.9    289   , INR   No results for input(s): "INR", "PROTIME" in the last 48 hours.  , Lipid Panel   No results for input(s): "CHOL", "HDL", "LDLCALC", "TRIG", "CHOLHDL" in the last 48 hours.  ,   Pathology Results  (Last 10 years)      None        , Troponin   No results for input(s): "TROPONINI" in the last 48 hours.  , and   Recent Lab Results         05/07/24 0411 05/06/24  1426        Albumin 3.6         ALP 86         ALT 56         Anion Gap 13         PTT 29.7  Comment: Refer to local heparin nomogram for intensity/dose specific   therapeutic   range.           AST 54         Baso # 0.09         Basophil % 0.8         BILIRUBIN TOTAL 0.9  Comment: For infants and newborns, interpretation of results should be based  on gestational age, weight and in agreement with clinical  observations.    Premature Infant recommended reference ranges:  Up to 24 hours.............<8.0 mg/dL  Up to 48 hours............<12.0 mg/dL  3-5 days..................<15.0 mg/dL  6-29 days.................<15.0 mg/dL           BNP   73  Comment: Values of less than 100 pg/ml are consistent with non-CHF populations.       BUN 30         Calcium 10.1         Chloride 93         CO2 29         Creatinine 1.4         Differential Method Automated         eGFR >60.0         Eos # 1.2         Eos % 10.2         Glucose 87         Gran # (ANC) 6.8         Gran % 58.6         Hematocrit 50.9         Hemoglobin 16.1         Immature Grans (Abs) 0.05  Comment: Mild elevation in immature granulocytes is non " specific and   can be seen in a variety of conditions including stress response,   acute inflammation, trauma and pregnancy. Correlation with other   laboratory and clinical findings is essential.           Immature Granulocytes 0.4         Lymph # 1.9         Lymph % 16.5         Magnesium  2.2         MCH 27.4         MCHC 31.6         MCV 87         Mono # 1.6         Mono % 13.5         MPV 11.1         nRBC 0         Phosphorus Level 4.2         Platelet Count 289         Potassium 3.7         PROTEIN TOTAL 7.5         RBC 5.87         RDW 13.6         Sodium 135         WBC 11.59                 Significant Imaging:     Coronary Artery Angiogram with [facility]: (5/6/24)    Diagnostic  Dominance: Right    Left Main   The vessel was visualized by angiography, is moderate in size and is angiographically normal.      Left Anterior Descending   The vessel was visualized by angiography, is moderate in size and is angiographically normal.      Left Circumflex   The vessel was visualized by angiography, is moderate in size and is angiographically normal.      Right Coronary Artery   The vessel was visualized by angiography, is moderate in size and is angiographically normal. The vessel originates from the left coronary sinus.      Intervention     No interventions have been documented.     Left Heart Findings    Left Ventricle    LVEDP (Pre): 20 mmHG.     Recommendations     Routine post-cath care.   Continue medical management.     CV US Renal Artery Stenosis Hypertension (5/2/24)      There is insignificant stenosis (0-59%) in the Right Renal Artery.    Elevated right renal resistive index suggestive of intrinsic kidney disease.    Right kidney 10.00 cm.    There is insignificant stenosis (0-59%) in the Left Renal Artery.    Elevated left renal resistive index suggestive of intrinsic kidney disease.    Left kidney 10.10 cm.    This was a technically difficult study. This study was limited due to excessive bowel  gas.    Echo (5/1/24 at 8:55 AM)      Left Ventricle: Ventricular mass is normal. Mildly increased wall thickness. Severe global hypokinesis present. Septal motion is abnormal. There is severely reduced systolic function with a visually estimated ejection fraction of 15 - 20%. Ejection fraction by visual approximation is 18%. The biplane LVEF was much higher but not accurate due to technical issues. There is diastolic dysfunction but grade cannot be determined.    Right Ventricle: Systolic function is moderately reduced.    Right Atrium: Right atrium is moderately dilated.    IVC/SVC: Intermediate venous pressure at 8 mmHg.    Echo (5/1/24 at 1:38 PM)    IMPRESSION:  Patient admitted for suspected MI with poor LV function on initial echocardiogram-  Technically difficult acoustic windows.  Qualitative impression of at least moderately dilated right atrium  No obvious signs of previously reported dilation of the tricuspid annulus.  There is trivial to mild tricuspid insufficiency.  Mild right ventricle hypertrophy with at least moderate dilation.  Qualitative impression of moderate to severely reduced systolic function of the inlet and apical segments of the right ventricle with large outflow tract patch.  Right ventricular pressure estimated 16 mmHg above right atrial pressure from reasonably well defined TR doppler profile.  Echodensity consistent with patch repair of ventricular septal defect and malalignment of the ventricular septum with no residual shunt demonstrated.  Images consistent with Anne implant in pulmonary homograft with peak velocity <1.7 m/sec and mean <7 mm Hg.  No significant pulmonary insufficiency.  2D imaging demonstrates well-developed confluence pulmonary branches.  The left atrium appears normal in size with walls not well defined for measurement.  Trivial mitral valve insufficiency.  Qualitative impression of mild concentric left ventricular hypertrophy.  Flattened septal motion with  decreased movement of the LV free wall, SF = 16% and EF qualitatively estimated 25 -30% from off axis apical views.  Trivial aortic valve insufficiency.  Aortic dimensions:  Sinuses of Valsalva = 38 mm.  ST junction              = 33 mm.  Ascending aorta      = 38 mm.  Normal size aortic arch with no evidence of coarctation.  No pericardial effusion.

## 2024-05-07 NOTE — PLAN OF CARE
Pt educated on fall risk, pt remained free from falls/trauma/injury. Denies chest pain, SOB, palpitations or dizziness. Plan of care reviewed with pt. Bed locked in lowest position, call bell within reach, no acute distress noted, will continue to monitor.    Problem: Adult Inpatient Plan of Care  Goal: Plan of Care Review  Outcome: Progressing  Goal: Patient-Specific Goal (Individualized)  Outcome: Progressing  Goal: Absence of Hospital-Acquired Illness or Injury  Outcome: Progressing  Goal: Optimal Comfort and Wellbeing  Outcome: Progressing  Goal: Readiness for Transition of Care  Outcome: Progressing     Problem: Fall Injury Risk  Goal: Absence of Fall and Fall-Related Injury  Outcome: Progressing     Problem: Acute Kidney Injury/Impairment  Goal: Fluid and Electrolyte Balance  Outcome: Progressing  Goal: Improved Oral Intake  Outcome: Progressing  Goal: Effective Renal Function  Outcome: Progressing

## 2024-05-16 ENCOUNTER — LAB VISIT (OUTPATIENT)
Dept: LAB | Facility: HOSPITAL | Age: 34
End: 2024-05-16
Payer: MEDICAID

## 2024-05-16 ENCOUNTER — OFFICE VISIT (OUTPATIENT)
Dept: CARDIOLOGY | Facility: CLINIC | Age: 34
End: 2024-05-16
Payer: MEDICAID

## 2024-05-16 VITALS
WEIGHT: 197.75 LBS | DIASTOLIC BLOOD PRESSURE: 82 MMHG | BODY MASS INDEX: 31.78 KG/M2 | HEART RATE: 84 BPM | SYSTOLIC BLOOD PRESSURE: 132 MMHG | OXYGEN SATURATION: 99 % | HEIGHT: 66 IN

## 2024-05-16 DIAGNOSIS — I50.20 HFREF (HEART FAILURE WITH REDUCED EJECTION FRACTION): ICD-10-CM

## 2024-05-16 DIAGNOSIS — I42.0 DILATED CARDIOMYOPATHY: ICD-10-CM

## 2024-05-16 DIAGNOSIS — Z98.890 S/P RIGHT VENTRICLE TO PULMONARY ARTERY (RV-PA) CONDUIT: ICD-10-CM

## 2024-05-16 DIAGNOSIS — Z87.74 S/P TOF (TETRALOGY OF FALLOT) REPAIR: Chronic | ICD-10-CM

## 2024-05-16 DIAGNOSIS — Z87.74 S/P TOF (TETRALOGY OF FALLOT) REPAIR: Primary | Chronic | ICD-10-CM

## 2024-05-16 LAB
ALBUMIN SERPL BCP-MCNC: 4.3 G/DL (ref 3.5–5.2)
ALP SERPL-CCNC: 77 U/L (ref 55–135)
ALT SERPL W/O P-5'-P-CCNC: 41 U/L (ref 10–44)
ANION GAP SERPL CALC-SCNC: 14 MMOL/L (ref 8–16)
ANION GAP SERPL CALC-SCNC: 14 MMOL/L (ref 8–16)
AST SERPL-CCNC: 40 U/L (ref 10–40)
BILIRUB SERPL-MCNC: 0.5 MG/DL (ref 0.1–1)
BNP SERPL-MCNC: 48 PG/ML (ref 0–99)
BUN SERPL-MCNC: 16 MG/DL (ref 6–20)
BUN SERPL-MCNC: 16 MG/DL (ref 6–20)
CALCIUM SERPL-MCNC: 10.3 MG/DL (ref 8.7–10.5)
CALCIUM SERPL-MCNC: 10.3 MG/DL (ref 8.7–10.5)
CHLORIDE SERPL-SCNC: 100 MMOL/L (ref 95–110)
CHLORIDE SERPL-SCNC: 100 MMOL/L (ref 95–110)
CO2 SERPL-SCNC: 22 MMOL/L (ref 23–29)
CO2 SERPL-SCNC: 22 MMOL/L (ref 23–29)
CREAT SERPL-MCNC: 1.3 MG/DL (ref 0.5–1.4)
CREAT SERPL-MCNC: 1.3 MG/DL (ref 0.5–1.4)
EST. GFR  (NO RACE VARIABLE): >60 ML/MIN/1.73 M^2
EST. GFR  (NO RACE VARIABLE): >60 ML/MIN/1.73 M^2
GLUCOSE SERPL-MCNC: 87 MG/DL (ref 70–110)
GLUCOSE SERPL-MCNC: 87 MG/DL (ref 70–110)
MAGNESIUM SERPL-MCNC: 1.9 MG/DL (ref 1.6–2.6)
POTASSIUM SERPL-SCNC: 5 MMOL/L (ref 3.5–5.1)
POTASSIUM SERPL-SCNC: 5 MMOL/L (ref 3.5–5.1)
PROT SERPL-MCNC: 8.4 G/DL (ref 6–8.4)
SODIUM SERPL-SCNC: 136 MMOL/L (ref 136–145)
SODIUM SERPL-SCNC: 136 MMOL/L (ref 136–145)

## 2024-05-16 PROCEDURE — 99999 PR PBB SHADOW E&M-EST. PATIENT-LVL III: CPT | Mod: PBBFAC,,, | Performed by: PEDIATRICS

## 2024-05-16 PROCEDURE — 1111F DSCHRG MED/CURRENT MED MERGE: CPT | Mod: CPTII,,, | Performed by: PEDIATRICS

## 2024-05-16 PROCEDURE — 80053 COMPREHEN METABOLIC PANEL: CPT | Performed by: PEDIATRICS

## 2024-05-16 PROCEDURE — 99213 OFFICE O/P EST LOW 20 MIN: CPT | Mod: S$PBB,,, | Performed by: PEDIATRICS

## 2024-05-16 PROCEDURE — 3075F SYST BP GE 130 - 139MM HG: CPT | Mod: CPTII,,, | Performed by: PEDIATRICS

## 2024-05-16 PROCEDURE — 83880 ASSAY OF NATRIURETIC PEPTIDE: CPT | Performed by: PEDIATRICS

## 2024-05-16 PROCEDURE — 36415 COLL VENOUS BLD VENIPUNCTURE: CPT | Performed by: PEDIATRICS

## 2024-05-16 PROCEDURE — G2211 COMPLEX E/M VISIT ADD ON: HCPCS | Mod: S$PBB,,, | Performed by: PEDIATRICS

## 2024-05-16 PROCEDURE — 99213 OFFICE O/P EST LOW 20 MIN: CPT | Mod: PBBFAC | Performed by: PEDIATRICS

## 2024-05-16 PROCEDURE — 3008F BODY MASS INDEX DOCD: CPT | Mod: CPTII,,, | Performed by: PEDIATRICS

## 2024-05-16 PROCEDURE — 83735 ASSAY OF MAGNESIUM: CPT | Performed by: PEDIATRICS

## 2024-05-16 PROCEDURE — 1159F MED LIST DOCD IN RCRD: CPT | Mod: CPTII,,, | Performed by: PEDIATRICS

## 2024-05-16 PROCEDURE — 3079F DIAST BP 80-89 MM HG: CPT | Mod: CPTII,,, | Performed by: PEDIATRICS

## 2024-05-16 NOTE — PROGRESS NOTES
2024    Re:Thomas Horvath  :1990     Sissy Lacy MD  [address]  [place] [address]    [facility] Note    Thomas Horvath is a 33 y.o. male with the following diagnoses:  1. 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   - epicardial left ventricular pacing wires in place.   3. 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 Mild pulmonary artery hypertension noted in the past.   5. Trace aortic insufficiency and mild aortic root enlargement  6. Long history of decreased LV function - possibly exacerbated by high afterload.   - mildly improved with ejection fraction around 40-45%  7. Hypertension - poor compliance in the past with medications, still an issue  Strong family history of hypertension.   - no significant renal artery stenosis on US 24  8. Recent admission May 2024 with chest pain, worsened EF, elevated troponin complicated by ARF due to hypotension  - coronary angiography 24 normal       Recommendations:  1.  Continue current medications.  If labs look good, will add back low dose Entresto.  2.  check labs  3.  Follow-up 3 months with echo, ekg, repeat labs  4.  SBE prophylaxis is definitely indicated.  Regular dental care strongly recommended.  5.  Any worsening shortness of breath, significant palpitations, syncope, chest pain, neurologic changes, or worsening headache or other concerning symptoms should prompt emergency room visit.  6.  Low salt diet.    Discussion:    1.  From a congenital heart standpoint he looks great.  The pulmonary valve is working well.  2.  That said, he had significant worsening of his left ventricular function noted in the hospital.  He was fluid overloaded, I am hoping we see some improvement in  the function.  It is also very unclear to me how compliant he was with his medications at home given his profound hypotension when he took what was supposed to be his home dose of medication while in the hospital.  3. Given his repaired tetralogy and significantly decreased left ventricular function, you could make a very good argument to place a defibrillator or, at least, to get a ventricular stimulation study.  When we discussed this in the hospital, he was not interested.  However, I definitely think he is at increased risk of sudden death and we need to reassess this if his ventricular function does not improve.  4. I am going to check blood work today.  If his kidney function is stable, I will restart some low-dose Entresto.    Interval history:  He tells me he is doing well.  He denies any worsening shortness of breath.  His chest pain, which was pretty constant in the hospital, is now much improved although it does still occur randomly.  No syncope.  No edema.  No other new issues.  He reports good compliance with his medication.  He now has a pillbox it helps him keep it straight.  He does struggle sometimes taking twice a day medications based on what time he wakes up.    He was recently admitted.  He presented to a local emergency room with chest pain, and his troponin was elevated.  He was transferred to [facility] April 30, 2024.  Pain was right-sided and started a few days prior to admission, was waxing and waning but had been consistently present, and did appear to improve with nitroglycerin in the outside hospital.  Patient also complained of cough for a week but denied any lower extremity edema or other heart failure symptoms.  On admission, echocardiogram was unchanged except his ejection fraction had worsened to about 30%.  He underwent a left heart catheterization that showed normal coronary arteries.  Upon initiation of his prescribed home goal-directed medical therapy, he became hypotensive which  was felt to be indicative of poor home medicine compliance.  He had an acute bump in his creatinine associated with this.  Carvedilol was gently restarted with up titration to 6.25 mg twice a day, and Aldactone 25 mg daily was started.  His hydralazine was then added.  He diuresed well in the hospital.    Recent past medical history:    I saw him in clinic on April 8, 2021 after not seeing him for 4 years.  He had been in nursing home.  In my clinic, he was significantly hypertensive.  While he was in nursing home, it looks like he had significant hypertension.  In addition to his normal lisinopril and carvedilol, he was placed on hydralazine and amlodipine.  He was also on a statin.  When he got out of nursing home 2 months prior to that visit with, he went back to only taking his original medications including the carvedilol and the lisinopril.  Had not been taking the amlodipine or the hydralazine.  Despite restarting his hydralazine and his amlodipine, he continued to be quite hypertensive.  I discussed his case with Dr. Arrington, who recommend transitioning to Entresto given the decreased left ventricular function.  When I started the Entresto, I purposely did not restart his spironolactone due to concerns about affecting potassium levels.  He was supposed to be on that medication, but he had been noncompliant with it.  No noted side effects from spironolactone.    The review of systems is as noted above. It is otherwise negative for other symptoms related to the general, neurological, psychiatric, endocrine, gastrointestinal, genitourinary, respiratory, dermatologic, musculoskeletal, hematologic, and immunologic systems.    Past Medical History:   Diagnosis Date    Aortic insufficiency     trace    Homograft cardiac valve stenosis     mild    Hypertension     Left ventricular dysfunction     Pulmonary artery hypertension     RV hypertension    Right ventricular dilation     S/P TOF (tetralogy of Fallot) repair 12/18/2014    TOF  (tetralogy of Fallot)      Past Surgical History:   Procedure Laterality Date    ANGIOGRAM, CORONARY, WITH LEFT HEART CATHETERIZATION  2024    Procedure: Angiogram, Coronary, with Left Heart Cath;  Surgeon: Anand Keen MD;  Location: [facility] CATH LAB;  Service: Cardiology;;    balloon dilation of pulmonary atrery homograft      [place]ARDIAC [place] [facility] [place]ONARY ANGIOGRAPHY Right 2024    Procedure: ANGIOGRAM, CORONARY ARTERY;  Surgeon: Anand Keen MD;  Location: [facility] CATH LAB;  Service: Cardiology;  Laterality: Right;    epicardial pacing wires      LV    pulmonary valve replacement  2006    TETRALOGY OF FALLOT REPAIR       Family History   Problem Relation Name Age of Onset    Hypertension Mother      No Known Problems Father      No Known Problems Sister 2     No Known Problems Brother 1     No Known Problems Maternal Grandmother      No Known Problems Maternal Grandfather      No Known Problems Paternal Grandmother      No Known Problems Paternal Grandfather      No Known Problems Maternal Aunt      No Known Problems Maternal Uncle      No Known Problems Paternal Aunt      No Known Problems Paternal Uncle      Anemia Neg Hx      Arrhythmia Neg Hx      Asthma Neg Hx      Clotting disorder Neg Hx      Fainting Neg Hx      Heart attack Neg Hx      Heart disease Neg Hx      Heart failure Neg Hx      Hyperlipidemia Neg Hx      Stroke Neg Hx      Atrial Septal Defect Neg Hx       Social History     Socioeconomic History    Marital status: Single    Years of education: 10   Tobacco Use    Smoking status: Former     Current packs/day: 0.00     Types: Cigarettes     Start date: 2006     Quit date: 2015     Years since quittin.9    Smokeless tobacco: Never    Tobacco comments:     marijuana   Substance and Sexual Activity    Alcohol use: No     Alcohol/week: 0.0 standard drinks of alcohol    Drug use: Yes     Frequency: 7.0 times per week     Types:  Marijuana    Sexual activity: Yes     Partners: Female   Social History Narrative    Lives at home with mother at this time.  He is uninsured and not working.     Social Determinants of Health     Financial Resource Strain: Low Risk  (5/1/2024)    Overall Financial Resource Strain (CARDIA)     Difficulty of Paying Living Expenses: Not hard at all   Food Insecurity: No Food Insecurity (5/1/2024)    Hunger Vital Sign     Worried About Running Out of Food in the Last Year: Never true     Ran Out of Food in the Last Year: Never true   Transportation Needs: No Transportation Needs (5/1/2024)    PRAPARE - Transportation     Lack of Transportation (Medical): No     Lack of Transportation (Non-Medical): No   Physical Activity: Unknown (5/1/2024)    Exercise Vital Sign     Days of Exercise per Week: 7 days     Minutes of Exercise per Session: Patient declined   Stress: No Stress Concern Present (5/1/2024)    [employer] [place] [employer] - Occupational Stress Questionnaire     Feeling of Stress : Not at all   Housing Stability: Low Risk  (5/1/2024)    Housing Stability Vital Sign     Unable to Pay for Housing in the Last Year: No     Homeless in the Last Year: No     Current Outpatient Medications on File Prior to Visit   Medication Sig Dispense Refill    aspirin 81 MG Chew Chew and swallow 1 tablet (81 mg total) by mouth once daily. 60 tablet 3    atorvastatin (LIPITOR) 80 MG tablet Take 1 tablet (80 mg total) by mouth every evening. 30 tablet 3    carvediloL (COREG) 6.25 MG tablet Take 1 tablet (6.25 mg total) by mouth 2 (two) times daily. 180 tablet 3    furosemide (LASIX) 80 MG tablet Take 1 tablet (80 mg total) by mouth 2 (two) times daily. 60 tablet 11    hydrALAZINE (APRESOLINE) 25 MG tablet Take 1 tablet (25 mg total) by mouth every 12 (twelve) hours. 60 tablet 11    spironolactone (ALDACTONE) 25 MG tablet Take 1 tablet (25 mg total) by mouth once daily. 30 tablet 11     No current facility-administered  "medications on file prior to visit.     Review of patient's allergies indicates:  No Known Allergies     Vitals:    05/16/24 1412   BP: 132/82   BP Location: Left arm   Patient Position: Sitting   Pulse: 84   SpO2: 99%   Weight: 89.7 kg (197 lb 12 oz)   Height: 5' 5.98" (1.676 m)     Wt Readings from Last 3 Encounters:   05/16/24 1412 89.7 kg (197 lb 12 oz)   05/07/24 0839 87.7 kg (193 lb 5.5 oz)   05/05/24 0742 89.9 kg (198 lb 3.1 oz)   05/04/24 0804 88 kg (194 lb 0.1 oz)   05/01/24 1339 98.5 kg (217 lb 2.5 oz)   05/01/24 0845 98.5 kg (217 lb 2.5 oz)   05/01/24 0201 98.5 kg (217 lb 2.5 oz)   05/01/24 0200 98.5 kg (217 lb 2.5 oz)   04/30/24 1839 81.6 kg (180 lb)   07/06/23 0912 81.7 kg (180 lb 1.9 oz)     In general, he is a healthy appearing, nondysmorphic-appearing, black male in no apparent distress. The eyes, nares, and oropharynx are clear.  The neck is supple without thyroid enlargement.  There is very mild jugular venous distention.  The lungs are clear to auscultation bilaterally. There is no wheezing.  There are no rales.  A well-healed median sternotomy incision is noted.  The first heart sound is normal.  A grade 1/6 systolic ejection murmur is best auscultated at the upper left sternal border.  There are no diastolic murmurs or gallops.  The second heart sound is fixed and split.  The abdominal exam is benign without hepatosplenomegaly, tenderness, or distention.  Pulses are normal in all extremities except the right hand where it is mildly decreased.  There is no clubbing, cyanosis, or edema.  No rashes are noted.  He does have a tattoo on the right arm.    I personally reviewed the following tests performed today and my interpretation follows:  No tests in clinic today.    Results for orders placed during the hospital encounter of 04/30/24    Echo    Interpretation Summary  CONGENITAL CARDIAC HISTORY:  Tetralogy of Fallot s/p repair  23-mm pulmonary homograft in pulmonary position - 7/7/2006.  Left " ventricular pacing wires.  Recurrent severe right ventricular hypertension: Multiple balloon dilations of pulmonary homograft (last 2010)  Anne valve 7/2015    Basic Anatomical Connections (previously demonstrated):  Abdominal situs is solitus.  Atrial situs is normal.  Atrioventricular concordance.  Grossly normal tricuspid and mitral valve structure.  RV dilation and RV hypertrophy.  Ventriculoarterial concordance.  Aortic valve is normal and pulmonic valve is abnormal.  Ventricular loop: D-loop.  Cardiac position is levocardia.  Left aortic arch branching.  No ventricular septal defect present.      IMPRESSION:  Patient admitted for suspected MI with poor LV function on initial echocardiogram-  Technically difficult acoustic windows.  Qualitative impression of at least moderately dilated right atrium  No obvious signs of previously reported dilation of the tricuspid annulus.  There is trivial to mild tricuspid insufficiency.  Mild right ventricle hypertrophy with at least moderate dilation.  Qualitative impression of moderate to severely reduced systolic function of the inlet and apical segments of the right ventricle with large outflow tract patch.  Right ventricular pressure estimated 16 mmHg above right atrial pressure from reasonably well defined TR doppler profile.  Echodensity consistent with patch repair of ventricular septal defect and malalignment of the ventricular septum with no residual shunt demonstrated.  Images consistent with Anne implant in pulmonary homograft with peak velocity <1.7 m/sec and mean <7 mm Hg.  No significant pulmonary insufficiency.  2D imaging demonstrates well-developed confluence pulmonary branches.  The left atrium appears normal in size with walls not well defined for measurement.  Trivial mitral valve insufficiency.  Qualitative impression of mild concentric left ventricular hypertrophy.  Flattened septal motion with decreased movement of the LV free wall, SF = 16% and  EF qualitatively estimated 25 -30% from off axis apical views.  Trivial aortic valve insufficiency.  Aortic dimensions:  Sinuses of Valsalva = 38 mm.  ST junction              = 33 mm.  Ascending aorta      = 38 mm.  Normal size aortic arch with no evidence of coarctation.  No pericardial effusion.      BNP   Date Value Ref Range Status   05/06/2024 73 0 - 99 pg/mL Final     Comment:     Values of less than 100 pg/ml are consistent with non-CHF populations.          BMP  Lab Results   Component Value Date     (L) 05/07/2024    K 3.7 05/07/2024    CL 93 (L) 05/07/2024    CO2 29 05/07/2024    BUN 30 (H) 05/07/2024    CREATININE 1.4 05/07/2024    CALCIUM 10.1 05/07/2024    ANIONGAP 13 05/07/2024    ESTGFRAFRICA >60.0 07/07/2022    EGFRNONAA >60.0 07/07/2022       Cath 7/23/15:  IMPRESSION:  1. Repaired tetralogy of Fallot with severe homograft stenosis, peak gradient 40 mmHg.  2. Right and left ventricular dysfunction and low cardiac output, normal pulmonary vascular resistance calculations.  3. Right aortic arch.  4. Right coronary artery from left sinus of Valsalva.  5. RV to PA conduit dilation with [place] 16, 18, 20 and 22 mm balloons.  6. RV to pulmonary artery conduit stent with [place] [id number] on 22 x 4 BIB (x3), postdilated with [place] 22 mm balloon (16 atmospheres).  7. Anne valve implantation on 22-[place] [employer], residual gradient 9 mmHg, trivial insufficiency.    Sincerely,        Ino Corona MD  Pediatric Cardiology  Adult Congenital Heart Disease  Pediatric Heart Failure and Transplantation  [facility]  [address] [place]  [address]  [phone]

## 2024-05-17 ENCOUNTER — PATIENT MESSAGE (OUTPATIENT)
Dept: CARDIOLOGY | Facility: CLINIC | Age: 34
End: 2024-05-17
Payer: MEDICAID

## 2024-05-17 ENCOUNTER — TELEPHONE (OUTPATIENT)
Dept: TRANSPLANT | Facility: HOSPITAL | Age: 34
End: 2024-05-17
Payer: MEDICAID

## 2024-05-17 DIAGNOSIS — I50.42 CHRONIC COMBINED SYSTOLIC AND DIASTOLIC HEART FAILURE: Primary | ICD-10-CM

## 2024-05-17 RX ORDER — SACUBITRIL AND VALSARTAN 24; 26 MG/1; MG/1
1 TABLET, FILM COATED ORAL 2 TIMES DAILY
Qty: 60 TABLET | Refills: 3 | Status: SHIPPED | OUTPATIENT
Start: 2024-05-17

## 2024-05-17 NOTE — TELEPHONE ENCOUNTER
I attempted him a few times today.  He did not answer, and his voicemail is not set up.  I will send a portal message as well.  Blood work in general looks great.  Potassium is 5, upper end of normal.  BNP much improved.  I am going to restart his Entresto at very low dose, and I want to recheck a BNP a week or so later.  I will have my nurse contact him to get that set up.  I will also send a portal message.

## 2024-05-23 ENCOUNTER — TELEPHONE (OUTPATIENT)
Dept: CARDIOLOGY | Facility: CLINIC | Age: 34
End: 2024-05-23
Payer: MEDICAID

## 2024-05-23 NOTE — TELEPHONE ENCOUNTER
Provided Code I50.2 for systolic heart failure ----- Message from Joyce Talamantes MA sent at 5/23/2024  9:28 AM CDT -----  Pharmacy is calling to clarify an RX.    RX name: [email or web address]    What do they need to clarify: Diagnosis Code    Comments:   [id number] - [place], [place] - [address] [place] [address]  [address] [facility] [address]  Phone: [phone] Fax: [phone]

## 2024-05-27 ENCOUNTER — PATIENT MESSAGE (OUTPATIENT)
Dept: CARDIOLOGY | Facility: CLINIC | Age: 34
End: 2024-05-27
Payer: MEDICAID

## 2024-05-27 DIAGNOSIS — Z87.74 S/P TOF (TETRALOGY OF FALLOT) REPAIR: Chronic | ICD-10-CM

## 2024-05-27 DIAGNOSIS — I42.0 DILATED CARDIOMYOPATHY: Primary | ICD-10-CM

## 2024-05-27 DIAGNOSIS — I50.20 HFREF (HEART FAILURE WITH REDUCED EJECTION FRACTION): ICD-10-CM

## 2024-05-27 DIAGNOSIS — Q24.9 ADULT CONGENITAL HEART DISEASE: ICD-10-CM

## 2024-06-05 RX ORDER — SPIRONOLACTONE 25 MG/1
25 TABLET ORAL
Qty: 30 TABLET | Refills: 3 | Status: SHIPPED | OUTPATIENT
Start: 2024-06-05

## 2024-06-12 NOTE — SUBJECTIVE & OBJECTIVE
Interval History:   No more chest pain. Trop peak at 4.6. LHC pending improvement in renal function. VINOD uptrending     Review of Systems   Constitutional: Positive for malaise/fatigue.     Objective:     Vital Signs (Most Recent):  Temp: 97.9 °F (36.6 °C) (05/02/24 1501)  Pulse: 86 (05/02/24 1801)  Resp: (!) 21 (05/02/24 1801)  BP: 106/66 (05/02/24 1801)  SpO2: 96 % (05/02/24 1801) Vital Signs (24h Range):  Temp:  [97.9 °F (36.6 °C)-98.2 °F (36.8 °C)] 97.9 °F (36.6 °C)  Pulse:  [80-95] 86  Resp:  [15-26] 21  SpO2:  [3 %-97 %] 96 %  BP: ()/(50-83) 106/66     Weight: 98.5 kg (217 lb 2.5 oz)  Body mass index is 35.05 kg/m².     SpO2: 96 %         Intake/Output Summary (Last 24 hours) at 5/2/2024 1912  Last data filed at 5/2/2024 1854  Gross per 24 hour   Intake 1217.24 ml   Output 2625 ml   Net -1407.76 ml       Lines/Drains/Airways       Peripheral Intravenous Line  Duration                  Peripheral IV - Single Lumen 04/30/24 1903 20 G Right Antecubital 2 days         Peripheral IV - Single Lumen 04/30/24 2218 20 G Left Antecubital 1 day         Peripheral IV - Single Lumen 05/02/24 0642 20 G Left;Posterior Hand <1 day                       Physical Exam  Constitutional:       General: He is not in acute distress.     Appearance: Normal appearance. He is not ill-appearing.   HENT:      Head: Normocephalic and atraumatic.      Nose: No congestion.      Mouth/Throat:      Mouth: Mucous membranes are moist.   Eyes:      Conjunctiva/sclera: Conjunctivae normal.   Cardiovascular:      Rate and Rhythm: Normal rate and regular rhythm.      Pulses: Normal pulses.   Pulmonary:      Effort: No respiratory distress.      Comments: Crackles bilaterally   Abdominal:      General: Abdomen is flat. There is no distension.      Palpations: Abdomen is soft.   Musculoskeletal:      Cervical back: Normal range of motion.      Right lower leg: No edema.      Left lower leg: No edema.   Skin:     Capillary Refill: Capillary  refill takes less than 2 seconds.      Findings: No rash.   Neurological:      Mental Status: He is alert and oriented to person, place, and time.   Psychiatric:         Mood and Affect: Mood normal.            Significant Labs: All pertinent lab results from the last 24 hours have been reviewed.     Detail Level: Zone Detail Level: Detailed Detail Level: Simple

## 2024-06-19 ENCOUNTER — PATIENT MESSAGE (OUTPATIENT)
Dept: CARDIOLOGY | Facility: CLINIC | Age: 34
End: 2024-06-19
Payer: MEDICAID

## 2024-06-19 RX ORDER — FUROSEMIDE 80 MG/1
80 TABLET ORAL 2 TIMES DAILY
Qty: 60 TABLET | Refills: 11 | Status: SHIPPED | OUTPATIENT
Start: 2024-06-19 | End: 2025-06-19

## 2024-08-05 ENCOUNTER — PATIENT MESSAGE (OUTPATIENT)
Dept: CARDIOLOGY | Facility: CLINIC | Age: 34
End: 2024-08-05
Payer: MEDICAID

## 2024-08-05 PROBLEM — I21.4 NSTEMI (NON-ST ELEVATED MYOCARDIAL INFARCTION): Status: RESOLVED | Noted: 2024-05-01 | Resolved: 2024-08-05

## 2024-08-05 PROBLEM — N17.9 AKI (ACUTE KIDNEY INJURY): Status: RESOLVED | Noted: 2024-05-02 | Resolved: 2024-08-05

## 2024-08-14 ENCOUNTER — HOSPITAL ENCOUNTER (OUTPATIENT)
Dept: CARDIOLOGY | Facility: CLINIC | Age: 34
Discharge: HOME | End: 2024-08-14
Payer: MEDICAID

## 2024-08-14 ENCOUNTER — PATIENT MESSAGE (OUTPATIENT)
Dept: CARDIOLOGY | Facility: CLINIC | Age: 34
End: 2024-08-14
Payer: MEDICAID

## 2024-08-14 ENCOUNTER — OFFICE VISIT (OUTPATIENT)
Dept: PEDIATRIC CARDIOLOGY | Facility: CLINIC | Age: 34
End: 2024-08-14
Payer: MEDICAID

## 2024-08-14 VITALS
OXYGEN SATURATION: 98 % | DIASTOLIC BLOOD PRESSURE: 92 MMHG | HEIGHT: 67 IN | WEIGHT: 207.88 LBS | BODY MASS INDEX: 32.63 KG/M2 | HEART RATE: 92 BPM | SYSTOLIC BLOOD PRESSURE: 151 MMHG

## 2024-08-14 DIAGNOSIS — Z87.74 S/P TOF (TETRALOGY OF FALLOT) REPAIR: Chronic | ICD-10-CM

## 2024-08-14 DIAGNOSIS — Z95.4 S/P TRANSCATHETER REPLACEMENT OF PULMONARY VALVE: ICD-10-CM

## 2024-08-14 DIAGNOSIS — I50.20 HFREF (HEART FAILURE WITH REDUCED EJECTION FRACTION): ICD-10-CM

## 2024-08-14 DIAGNOSIS — Z98.890 S/P RIGHT VENTRICLE TO PULMONARY ARTERY (RV-PA) CONDUIT: ICD-10-CM

## 2024-08-14 DIAGNOSIS — I42.0 DILATED CARDIOMYOPATHY: ICD-10-CM

## 2024-08-14 DIAGNOSIS — Q24.9 ADULT CONGENITAL HEART DISEASE: ICD-10-CM

## 2024-08-14 DIAGNOSIS — I50.20 HFREF (HEART FAILURE WITH REDUCED EJECTION FRACTION): Primary | ICD-10-CM

## 2024-08-14 LAB
OHS QRS DURATION: 158 MS
OHS QTC CALCULATION: 510 MS

## 2024-08-14 PROCEDURE — 4010F ACE/ARB THERAPY RXD/TAKEN: CPT | Mod: CPTII,,, | Performed by: PEDIATRICS

## 2024-08-14 PROCEDURE — 3077F SYST BP >= 140 MM HG: CPT | Mod: CPTII,,, | Performed by: PEDIATRICS

## 2024-08-14 PROCEDURE — 99213 OFFICE O/P EST LOW 20 MIN: CPT | Mod: S$PBB,,, | Performed by: PEDIATRICS

## 2024-08-14 PROCEDURE — 93010 ELECTROCARDIOGRAM REPORT: CPT | Mod: S$PBB,,, | Performed by: STUDENT IN AN ORGANIZED HEALTH CARE EDUCATION/TRAINING PROGRAM

## 2024-08-14 PROCEDURE — 3008F BODY MASS INDEX DOCD: CPT | Mod: CPTII,,, | Performed by: PEDIATRICS

## 2024-08-14 PROCEDURE — 1159F MED LIST DOCD IN RCRD: CPT | Mod: CPTII,,, | Performed by: PEDIATRICS

## 2024-08-14 PROCEDURE — 99999 PR PBB SHADOW E&M-EST. PATIENT-LVL III: CPT | Mod: PBBFAC,,, | Performed by: PEDIATRICS

## 2024-08-14 PROCEDURE — 3080F DIAST BP >= 90 MM HG: CPT | Mod: CPTII,,, | Performed by: PEDIATRICS

## 2024-08-14 PROCEDURE — 99213 OFFICE O/P EST LOW 20 MIN: CPT | Mod: PBBFAC,25 | Performed by: PEDIATRICS

## 2024-08-14 PROCEDURE — 93005 ELECTROCARDIOGRAM TRACING: CPT | Mod: PBBFAC | Performed by: STUDENT IN AN ORGANIZED HEALTH CARE EDUCATION/TRAINING PROGRAM

## 2024-08-14 RX ORDER — FUROSEMIDE 80 MG/1
80 TABLET ORAL 2 TIMES DAILY
Qty: 60 TABLET | Refills: 11 | Status: SHIPPED | OUTPATIENT
Start: 2024-08-14 | End: 2025-08-14

## 2024-08-14 NOTE — PROGRESS NOTES
2024    Re:Thomas Horvath  :1990     Sissy Lacy MD  [address] [facility] [place] [address]    [facility] Note    Thomas Horvath is a 33 y.o. male with the following diagnoses:  1. 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   - epicardial left ventricular pacing wires in place.   3. 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 Mild pulmonary artery hypertension noted in the past.   5. Trace aortic insufficiency and mild aortic root enlargement  6. Long history of decreased LV function - possibly exacerbated by high afterload.   - mildly improved with ejection fraction around 40-45%  7. Hypertension - poor compliance in the past with medications, still an issue  Strong family history of hypertension.   - no significant renal artery stenosis on US 24  8. Recent admission May 2024 with chest pain, worsened EF, elevated troponin complicated by ARF due to hypotension  - coronary angiography 24 normal       Recommendations:  1.  Continue current medications.  Restart Lasix.  2.  check labs  3.  Follow-up 3 months with echo, ekg, repeat labs  4.  SBE prophylaxis is definitely indicated.  Regular dental care strongly recommended.  5.  Any worsening shortness of breath, significant palpitations, syncope, chest pain, neurologic changes, or worsening headache or other concerning symptoms should prompt emergency room visit.  6.  Low salt diet.    Discussion:    1.  From a congenital heart standpoint he looks great.  The pulmonary valve is working well.  2.  That said, he had significant worsening of his left ventricular function noted in the hospital when he was last admitted in May.  He was fluid overloaded, I am hoping we see some improvement in the  function.  It is also very unclear to me how compliant he was with his medications at home given his profound hypotension when he took what was supposed to be his home dose of medication while in the hospital.  3. Given his repaired tetralogy and significantly decreased left ventricular function, you could make a very good argument to place a defibrillator or, at least, to get a ventricular stimulation study. I definitely think he is at increased risk of sudden death and we need to reassess this if his ventricular function does not improve.  However, he remains uninterested in this at present.  4. The plan was to get an echocardiogram today to reassess function and to potentially go up on his medications.  However, he has lost his insurance, and he is paying out-of-pocket.  I am going to delay echo, and hopefully he will be back on Medicaid in the near future.  I restarted his Lasix.  My plan is as noted above.      Interval history:  He recently lost his insurance.  He ran out of his Lasix, so he has not been taking that.  He reports that he has been taking all of his other medications.  He feels like he has been more fluid overloaded since stopping the Lasix.  He has also had more shortness of breath.  No syncope or near-syncope.  No peripheral edema although he feels full in his belly if he does not take the Lasix.    He was recently admitted.  He presented to a local emergency room with chest pain, and his troponin was elevated.  He was transferred to [facility] April 30, 2024.  Pain was right-sided and started a few days prior to admission, was waxing and waning but had been consistently present, and did appear to improve with nitroglycerin in the outside hospital.  Patient also complained of cough for a week but denied any lower extremity edema or other heart failure symptoms.  On admission, echocardiogram was unchanged except his ejection fraction had worsened to about 30%.  He underwent a left heart  catheterization that showed normal coronary arteries.  Upon initiation of his prescribed home goal-directed medical therapy, he became hypotensive which was felt to be indicative of poor home medicine compliance.  He had an acute bump in his creatinine associated with this.  Carvedilol was gently restarted with up titration to 6.25 mg twice a day, and Aldactone 25 mg daily was started.  His hydralazine was then added.  He diuresed well in the hospital.    Recent past medical history:    I saw him in clinic on April 8, 2021 after not seeing him for 4 years.  He had been in intermediate.  In my clinic, he was significantly hypertensive.  While he was in intermediate, it looks like he had significant hypertension.  In addition to his normal lisinopril and carvedilol, he was placed on hydralazine and amlodipine.  He was also on a statin.  When he got out of intermediate 2 months prior to that visit with, he went back to only taking his original medications including the carvedilol and the lisinopril.  Had not been taking the amlodipine or the hydralazine.  Despite restarting his hydralazine and his amlodipine, he continued to be quite hypertensive.  I discussed his case with Dr. Arrington, who recommend transitioning to Entresto given the decreased left ventricular function.  When I started the Entresto, I purposely did not restart his spironolactone due to concerns about affecting potassium levels.  He was supposed to be on that medication, but he had been noncompliant with it.  No noted side effects from spironolactone.    The review of systems is as noted above. It is otherwise negative for other symptoms related to the general, neurological, psychiatric, endocrine, gastrointestinal, genitourinary, respiratory, dermatologic, musculoskeletal, hematologic, and immunologic systems.    Past Medical History:   Diagnosis Date    Aortic insufficiency     trace    Homograft cardiac valve stenosis     mild    Hypertension     Left ventricular  dysfunction     Pulmonary artery hypertension     RV hypertension    Right ventricular dilation     S/P TOF (tetralogy of Fallot) repair 2014    TOF (tetralogy of Fallot)      Past Surgical History:   Procedure Laterality Date    ANGIOGRAM, CORONARY, WITH LEFT HEART CATHETERIZATION  2024    Procedure: Angiogram, Coronary, with Left Heart Cath;  Surgeon: Anand Keen MD;  Location: [facility] CATH LAB;  Service: Cardiology;;    balloon dilation of pulmonary atrery homograft      [place] [facility] [place]ARY ANGIOGRAPHY Right 2024    Procedure: ANGIOGRAM, CORONARY ARTERY;  Surgeon: Anand Keen MD;  Location: [facility] CATH LAB;  Service: Cardiology;  Laterality: Right;    epicardial pacing wires      LV    pulmonary valve replacement  2006    TETRALOGY OF FALLOT REPAIR       Family History   Problem Relation Name Age of Onset    Hypertension Mother      No Known Problems Father      No Known Problems Sister 2     No Known Problems Brother 1     No Known Problems Maternal Grandmother      No Known Problems Maternal Grandfather      No Known Problems Paternal Grandmother      No Known Problems Paternal Grandfather      No Known Problems Maternal Aunt      No Known Problems Maternal Uncle      No Known Problems Paternal Aunt      No Known Problems Paternal Uncle      Anemia Neg Hx      Arrhythmia Neg Hx      Asthma Neg Hx      Clotting disorder Neg Hx      Fainting Neg Hx      Heart attack Neg Hx      Heart disease Neg Hx      Heart failure Neg Hx      Hyperlipidemia Neg Hx      Stroke Neg Hx      Atrial Septal Defect Neg Hx       Social History     Socioeconomic History    Marital status: Single    Years of education: 10   Tobacco Use    Smoking status: Former     Current packs/day: 0.00     Types: Cigarettes     Start date: 2006     Quit date: 2015     Years since quittin.1    Smokeless tobacco: Never    Tobacco comments:     marijuana   Substance  and Sexual Activity    Alcohol use: No     Alcohol/week: 0.0 standard drinks of alcohol    Drug use: Yes     Frequency: 7.0 times per week     Types: Marijuana    Sexual activity: Yes     Partners: Female   Social History Narrative    Lives at home with mother at this time.  He is uninsured and not working.     Social Determinants of Health     Financial Resource Strain: Low Risk  (5/1/2024)    Overall Financial Resource Strain (CARDIA)     Difficulty of Paying Living Expenses: Not hard at all   Food Insecurity: No Food Insecurity (5/1/2024)    Hunger Vital Sign     Worried About Running Out of Food in the Last Year: Never true     Ran Out of Food in the Last Year: Never true   Transportation Needs: No Transportation Needs (5/1/2024)    PRAPARE - Transportation     Lack of Transportation (Medical): No     Lack of Transportation (Non-Medical): No   Physical Activity: Unknown (5/1/2024)    Exercise Vital Sign     Days of Exercise per Week: 7 days     Minutes of Exercise per Session: Patient declined   Stress: No Stress Concern Present (5/1/2024)    [employer] - Occupational Stress Questionnaire     Feeling of Stress : Not at all   Housing Stability: Low Risk  (5/1/2024)    Housing Stability Vital Sign     Unable to Pay for Housing in the Last Year: No     Homeless in the Last Year: No     Current Outpatient Medications on File Prior to Visit   Medication Sig Dispense Refill    aspirin 81 MG Chew Chew and swallow 1 tablet (81 mg total) by mouth once daily. 60 tablet 3    atorvastatin (LIPITOR) 80 MG tablet Take 1 tablet (80 mg total) by mouth every evening. 30 tablet 3    carvediloL (COREG) 6.25 MG tablet Take 1 tablet (6.25 mg total) by mouth 2 (two) times daily. 180 tablet 3    hydrALAZINE (APRESOLINE) 25 MG tablet Take 1 tablet (25 mg total) by mouth every 12 (twelve) hours. 60 tablet 11    sacubitriL-valsartan (ENTRESTO) 24-26 mg per tablet Take 1 tablet by mouth 2 (two) times daily.  "60 tablet 3    spironolactone (ALDACTONE) 25 MG tablet TAKE 1 TABLET BY MOUTH ONCE DAILY 30 tablet 3    furosemide (LASIX) 80 MG tablet Take 1 tablet (80 mg total) by mouth 2 (two) times daily. (Patient not taking: Reported on 8/14/2024) 60 tablet 11     No current facility-administered medications on file prior to visit.     Review of patient's allergies indicates:  No Known Allergies     Vitals:    08/14/24 1312   BP: (!) 151/92   BP Location: Right arm   Patient Position: Sitting   Pulse: 92   SpO2: 98%   Weight: 94.3 kg (207 lb 14.3 oz)   Height: 5' 7.13" (1.705 m)     Wt Readings from Last 3 Encounters:   08/14/24 1312 94.3 kg (207 lb 14.3 oz)   05/16/24 1412 89.7 kg (197 lb 12 oz)   05/07/24 0839 87.7 kg (193 lb 5.5 oz)   05/05/24 0742 89.9 kg (198 lb 3.1 oz)   05/04/24 0804 88 kg (194 lb 0.1 oz)   05/01/24 1339 98.5 kg (217 lb 2.5 oz)   05/01/24 0845 98.5 kg (217 lb 2.5 oz)   05/01/24 0201 98.5 kg (217 lb 2.5 oz)   05/01/24 0200 98.5 kg (217 lb 2.5 oz)   04/30/24 1839 81.6 kg (180 lb)     In general, he is a healthy appearing, nondysmorphic-appearing, black male in no apparent distress. The eyes, nares, and oropharynx are clear.  The neck is supple without thyroid enlargement.  There is very mild jugular venous distention.  The lungs are clear to auscultation bilaterally. There is no wheezing.  There are no rales.  A well-healed median sternotomy incision is noted.  The first heart sound is normal.  A grade 1/6 systolic ejection murmur is best auscultated at the upper left sternal border.  There are no diastolic murmurs or gallops.  The second heart sound is fixed and split.  The abdominal exam is benign without hepatosplenomegaly, tenderness, or distention.  Pulses are normal in all extremities except the right hand where it is mildly decreased.  There is no clubbing, cyanosis, or edema.  No rashes are noted.  He does have a tattoo on the right arm.    I personally reviewed the following tests performed " today and my interpretation follows:  EKG in clinic today reveals sinus rhythm with a right bundle branch block.    Results for orders placed during the hospital encounter of 04/30/24    Echo    Interpretation Summary  CONGENITAL CARDIAC HISTORY:  Tetralogy of Fallot s/p repair  23-mm pulmonary homograft in pulmonary position - 7/7/2006.  Left ventricular pacing wires.  Recurrent severe right ventricular hypertension: Multiple balloon dilations of pulmonary homograft (last 2010)  Anne valve 7/2015    Basic Anatomical Connections (previously demonstrated):  Abdominal situs is solitus.  Atrial situs is normal.  Atrioventricular concordance.  Grossly normal tricuspid and mitral valve structure.  RV dilation and RV hypertrophy.  Ventriculoarterial concordance.  Aortic valve is normal and pulmonic valve is abnormal.  Ventricular loop: D-loop.  Cardiac position is levocardia.  Left aortic arch branching.  No ventricular septal defect present.      IMPRESSION:  Patient admitted for suspected MI with poor LV function on initial echocardiogram-  Technically difficult acoustic windows.  Qualitative impression of at least moderately dilated right atrium  No obvious signs of previously reported dilation of the tricuspid annulus.  There is trivial to mild tricuspid insufficiency.  Mild right ventricle hypertrophy with at least moderate dilation.  Qualitative impression of moderate to severely reduced systolic function of the inlet and apical segments of the right ventricle with large outflow tract patch.  Right ventricular pressure estimated 16 mmHg above right atrial pressure from reasonably well defined TR doppler profile.  Echodensity consistent with patch repair of ventricular septal defect and malalignment of the ventricular septum with no residual shunt demonstrated.  Images consistent with Anne implant in pulmonary homograft with peak velocity <1.7 m/sec and mean <7 mm Hg.  No significant pulmonary insufficiency.  2D  imaging demonstrates well-developed confluence pulmonary branches.  The left atrium appears normal in size with walls not well defined for measurement.  Trivial mitral valve insufficiency.  Qualitative impression of mild concentric left ventricular hypertrophy.  Flattened septal motion with decreased movement of the LV free wall, SF = 16% and EF qualitatively estimated 25 -30% from off axis apical views.  Trivial aortic valve insufficiency.  Aortic dimensions:  Sinuses of Valsalva = 38 mm.  ST junction              = 33 mm.  Ascending aorta      = 38 mm.  Normal size aortic arch with no evidence of coarctation.  No pericardial effusion.      BNP   Date Value Ref Range Status   05/16/2024 48 0 - 99 pg/mL Final     Comment:     Values of less than 100 pg/ml are consistent with non-CHF populations.          BMP  Lab Results   Component Value Date     05/16/2024     05/16/2024    K 5.0 05/16/2024    K 5.0 05/16/2024     05/16/2024     05/16/2024    CO2 22 (L) 05/16/2024    CO2 22 (L) 05/16/2024    BUN 16 05/16/2024    BUN 16 05/16/2024    CREATININE 1.3 05/16/2024    CREATININE 1.3 05/16/2024    CALCIUM 10.3 05/16/2024    CALCIUM 10.3 05/16/2024    ANIONGAP 14 05/16/2024    ANIONGAP 14 05/16/2024    ESTGFRAFRICA >60.0 07/07/2022    EGFRNONAA >60.0 07/07/2022       Cath 7/23/15:  IMPRESSION:  1. Repaired tetralogy of Fallot with severe homograft stenosis, peak gradient 40 mmHg.  2. Right and left ventricular dysfunction and low cardiac output, normal pulmonary vascular resistance calculations.  3. Right aortic arch.  4. Right coronary artery from left sinus of Valsalva.  5. RV to PA conduit dilation with [place] 16, 18, 20 and 22 mm balloons.  6. RV to pulmonary artery conduit stent with [place] [id number] on 22 x 4 BIB (x3), postdilated with [place] 22 mm balloon (16 atmospheres).  7. Anne valve implantation on 22-[place] [employer], residual gradient 9 mmHg, trivial insufficiency.    Sincerely,        Ino  MEENAKSHI Corona MD  Pediatric Cardiology  Adult Congenital Heart Disease  Pediatric Heart Failure and Transplantation  [facility] [address] [place]  [address]  [phone]

## 2024-08-21 RX ORDER — SPIRONOLACTONE 25 MG/1
25 TABLET ORAL DAILY
Qty: 30 TABLET | Refills: 3 | Status: SHIPPED | OUTPATIENT
Start: 2024-08-21

## 2024-08-21 RX ORDER — SACUBITRIL AND VALSARTAN 24; 26 MG/1; MG/1
1 TABLET, FILM COATED ORAL 2 TIMES DAILY
Qty: 60 TABLET | Refills: 3 | Status: SHIPPED | OUTPATIENT
Start: 2024-08-21

## 2024-10-30 ENCOUNTER — PATIENT MESSAGE (OUTPATIENT)
Dept: CARDIOLOGY | Facility: CLINIC | Age: 34
End: 2024-10-30
Payer: MEDICAID

## 2024-10-30 DIAGNOSIS — I35.1 NONRHEUMATIC AORTIC VALVE INSUFFICIENCY: ICD-10-CM

## 2024-10-30 DIAGNOSIS — Z87.74 S/P TOF (TETRALOGY OF FALLOT) REPAIR: Primary | Chronic | ICD-10-CM

## 2024-10-30 DIAGNOSIS — I50.20 HFREF (HEART FAILURE WITH REDUCED EJECTION FRACTION): ICD-10-CM

## 2024-11-13 DIAGNOSIS — I42.0 DILATED CARDIOMYOPATHY: Primary | ICD-10-CM

## 2024-11-13 DIAGNOSIS — Q24.9 ADULT CONGENITAL HEART DISEASE: ICD-10-CM

## 2024-11-13 DIAGNOSIS — Q25.47 RIGHT AORTIC ARCH: ICD-10-CM

## 2024-11-13 DIAGNOSIS — Z87.74 S/P TOF (TETRALOGY OF FALLOT) REPAIR: Chronic | ICD-10-CM

## 2024-12-05 ENCOUNTER — HOSPITAL ENCOUNTER (OUTPATIENT)
Dept: CARDIOLOGY | Facility: HOSPITAL | Age: 34
Discharge: HOME | End: 2024-12-05
Attending: PEDIATRICS
Payer: MEDICAID

## 2024-12-05 ENCOUNTER — HOSPITAL ENCOUNTER (OUTPATIENT)
Dept: CARDIOLOGY | Facility: CLINIC | Age: 34
Discharge: HOME | End: 2024-12-05
Payer: MEDICAID

## 2024-12-05 ENCOUNTER — PATIENT MESSAGE (OUTPATIENT)
Dept: CARDIOLOGY | Facility: CLINIC | Age: 34
End: 2024-12-05

## 2024-12-05 ENCOUNTER — DOCUMENTATION ONLY (OUTPATIENT)
Dept: CARDIOLOGY | Facility: CLINIC | Age: 34
End: 2024-12-05

## 2024-12-05 ENCOUNTER — OFFICE VISIT (OUTPATIENT)
Dept: CARDIOLOGY | Facility: CLINIC | Age: 34
End: 2024-12-05
Payer: MEDICAID

## 2024-12-05 VITALS
WEIGHT: 201.5 LBS | HEIGHT: 67 IN | HEART RATE: 99 BPM | OXYGEN SATURATION: 98 % | BODY MASS INDEX: 32.49 KG/M2 | SYSTOLIC BLOOD PRESSURE: 142 MMHG | HEIGHT: 67 IN | BODY MASS INDEX: 31.63 KG/M2 | DIASTOLIC BLOOD PRESSURE: 99 MMHG | WEIGHT: 207 LBS

## 2024-12-05 DIAGNOSIS — Z87.74 S/P TOF (TETRALOGY OF FALLOT) REPAIR: Primary | Chronic | ICD-10-CM

## 2024-12-05 DIAGNOSIS — I42.0 DILATED CARDIOMYOPATHY: ICD-10-CM

## 2024-12-05 DIAGNOSIS — I50.20 HFREF (HEART FAILURE WITH REDUCED EJECTION FRACTION): ICD-10-CM

## 2024-12-05 DIAGNOSIS — Q21.3 TOF (TETRALOGY OF FALLOT): ICD-10-CM

## 2024-12-05 DIAGNOSIS — Q25.47 RIGHT AORTIC ARCH: ICD-10-CM

## 2024-12-05 DIAGNOSIS — Z95.4 S/P TRANSCATHETER REPLACEMENT OF PULMONARY VALVE: ICD-10-CM

## 2024-12-05 DIAGNOSIS — Q24.9 ADULT CONGENITAL HEART DISEASE: ICD-10-CM

## 2024-12-05 DIAGNOSIS — I10 ESSENTIAL HYPERTENSION: ICD-10-CM

## 2024-12-05 DIAGNOSIS — Z87.74 S/P TOF (TETRALOGY OF FALLOT) REPAIR: Chronic | ICD-10-CM

## 2024-12-05 DIAGNOSIS — I35.1 NONRHEUMATIC AORTIC VALVE INSUFFICIENCY: ICD-10-CM

## 2024-12-05 LAB
ASCENDING AORTA: 3.59 CM
AV AREA BY CONTINUOUS VTI: 5.6 CM2
AV INDEX (PROSTH): 0.91
AV LVOT MEAN GRADIENT: 0 MMHG
AV LVOT PEAK GRADIENT: 1 MMHG
AV MEAN GRADIENT: 1.1 MMHG
AV PEAK GRADIENT: 2 MMHG
AV VALVE AREA BY VELOCITY RATIO: 3.5 CM²
AV VALVE AREA: 5.6 CM2
AV VELOCITY RATIO: 0.57
BSA FOR ECHO PROCEDURE: 2.11 M2
CV ECHO LV RWT: 0.46 CM
DOP CALC AO PEAK VEL: 0.7 M/S
DOP CALC AO VTI: 9.4 CM
DOP CALC LVOT AREA: 6.2 CM2
DOP CALC LVOT DIAMETER: 2.8 CM
DOP CALC LVOT PEAK VEL: 0.4 M/S
DOP CALC LVOT STROKE VOLUME: 52.9 CM3
DOP CALC RVOT VTI: 6.31 CM
DOP CALCLVOT PEAK VEL VTI: 8.6 CM
E/E' RATIO: 13.78 M/S
ECHO EF ESTIMATED: 44 %
ECHO LV POSTERIOR WALL: 1.3 CM (ref 0.6–1.1)
FRACTIONAL SHORTENING: 22.8 % (ref 28–44)
INTERVENTRICULAR SEPTUM: 1.1 CM (ref 0.6–1.1)
LA MAJOR: 5.49 CM
LA MINOR: 5.45 CM
LA WIDTH: 3.61 CM
LEFT ATRIUM SIZE: 3.21 CM
LEFT ATRIUM VOLUME INDEX MOD: 21.4 ML/M2
LEFT ATRIUM VOLUME INDEX: 26.3 ML/M2
LEFT ATRIUM VOLUME MOD: 43.96 ML
LEFT ATRIUM VOLUME: 53.88 CM3
LEFT INTERNAL DIMENSION IN SYSTOLE: 4.4 CM (ref 2.1–4)
LEFT VENTRICLE DIASTOLIC VOLUME INDEX: 77.33 ML/M2
LEFT VENTRICLE DIASTOLIC VOLUME: 158.53 ML
LEFT VENTRICLE MASS INDEX: 140.8 G/M2
LEFT VENTRICLE SYSTOLIC VOLUME INDEX: 43.7 ML/M2
LEFT VENTRICLE SYSTOLIC VOLUME: 89.53 ML
LEFT VENTRICULAR INTERNAL DIMENSION IN DIASTOLE: 5.7 CM (ref 3.5–6)
LEFT VENTRICULAR MASS: 288.7 G
LV LATERAL E/E' RATIO: 12.4 M/S
LV SEPTAL E/E' RATIO: 15.5 M/S
MV PEAK E VEL: 0.62 M/S
OHS CV RV/LV RATIO: 0.89 CM
OHS QRS DURATION: 166 MS
OHS QTC CALCULATION: 487 MS
PISA TR MAX VEL: 2.55 M/S
PV MEAN GRADIENT: 10 MMHG
PV MV: 1.44 M/S
PV PEAK GRADIENT: 16 MMHG
PV PEAK VELOCITY: 1.99 M/S
RA MAJOR: 6.18 CM
RA WIDTH: 4.16 CM
RIGHT ATRIAL AREA: 23.7 CM2
RIGHT VENTRICLE DIASTOLIC BASEL DIMENSION: 5.1 CM
RV TISSUE DOPPLER FREE WALL SYSTOLIC VELOCITY 1 (APICAL 4 CHAMBER VIEW): 7.43 CM/S
SINUS: 3.87 CM
STJ: 3.61 CM
TDI LATERAL: 0.05 M/S
TDI SEPTAL: 0.04 M/S
TDI: 0.05 M/S
TR MAX PG: 26 MMHG
TRICUSPID ANNULAR PLANE SYSTOLIC EXCURSION: 1.51 CM
TV PEAK GRADIENT: 26 MMHG
Z-SCORE OF LEFT VENTRICULAR DIMENSION IN END DIASTOLE: -0.76
Z-SCORE OF LEFT VENTRICULAR DIMENSION IN END SYSTOLE: 1.23

## 2024-12-05 PROCEDURE — 99999 PR PBB SHADOW E&M-EST. PATIENT-LVL III: CPT | Mod: PBBFAC,,,

## 2024-12-05 PROCEDURE — 93005 ELECTROCARDIOGRAM TRACING: CPT | Mod: PBBFAC | Performed by: INTERNAL MEDICINE

## 2024-12-05 PROCEDURE — 93303 ECHO TRANSTHORACIC: CPT | Mod: 26,,, | Performed by: PEDIATRICS

## 2024-12-05 PROCEDURE — 93303 ECHO TRANSTHORACIC: CPT

## 2024-12-05 PROCEDURE — 93325 DOPPLER ECHO COLOR FLOW MAPG: CPT | Mod: 26,,, | Performed by: PEDIATRICS

## 2024-12-05 PROCEDURE — 99213 OFFICE O/P EST LOW 20 MIN: CPT | Mod: PBBFAC,25

## 2024-12-05 PROCEDURE — 93320 DOPPLER ECHO COMPLETE: CPT | Mod: 26,,, | Performed by: PEDIATRICS

## 2024-12-05 RX ORDER — SACUBITRIL AND VALSARTAN 49; 51 MG/1; MG/1
1 TABLET, FILM COATED ORAL 2 TIMES DAILY
Qty: 180 TABLET | Refills: 3 | Status: SHIPPED | OUTPATIENT
Start: 2024-12-05

## 2024-12-05 NOTE — PROGRESS NOTES
2024    Re:Thomas Horvath  :1990     Sissy Lacy MD  [address] [facility] [place] [address]    [facility] Note    Thomas Horvath is a 34 y.o. male with the following diagnoses:  1. 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   - epicardial left ventricular pacing wires in place.   3. 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 Mild pulmonary artery hypertension noted in the past.   5. Trace aortic insufficiency and mild aortic root enlargement  6. Long history of decreased LV function - possibly exacerbated by high afterload.   7. Hypertension - poor compliance in the past with medications, improving  Strong family history of hypertension.   - no significant renal artery stenosis on US 24  8. Recent admission May 2024 with chest pain, worsened EF, elevated troponin complicated by ARF due to hypotension  - coronary angiography 24 normal       Recommendations:  1.  Continue current medications.  Increase Entresto today to 49/51 mg.  2.  check labs today  3.  Follow-up 3 months with echo, EKG and NT pro BNP and BMP  4.  SBE prophylaxis is definitely indicated.  Regular dental care strongly recommended.  5.  Any worsening shortness of breath, significant palpitations, syncope, chest pain, neurologic changes, or worsening headache or other concerning symptoms should prompt emergency room visit.  6.  Low salt diet.    Discussion:    1.  From a congenital heart standpoint he looks great.  The pulmonary valve is working well and looks great on today's echo.  2.  That said, he had significant worsening of his left ventricular function noted in the hospital when he was last admitted in May.  He was fluid overloaded. We were hoping to see some  improvement in the function at this point as it's 6 months after discharge. However, his function appear unchanged on today's echo and hasn't improved significantly. EF was also 25-30% today.   3. In the past, there have been concerns about how compliant he is with his medications at home given his profound hypotension when he took what was supposed to be his home dose of medication while in the hospital. Today he reports that he has taken his medication regularly, only occasionally missing a day or two. His blood pressure is still high at 142/99 (although improved) so we are increasing his Entresto dose today, which could also hopefully help his function.  4. Given his repaired tetralogy and significantly decreased left ventricular function, you could make a very good argument to place a defibrillator. We definitely think he is at increased risk of sudden death and we need to reassess this if his ventricular function does not improve.  We had a long talk about this today and strongly recommended that he consider having an ICD placed. He is giving it serious consideration and said that he would let us know when he follow up in 3 months. He says he feels like he doesn't need it and that he can just exercise more, so provided patient education about the need for an ICD even if asymptomatic. He had some concerns about wearing a sling for a few days post-procedure on his left arm since he is left-handed. Spoke with Dr. Diallo who said that the sling is used to prevent patients from overusing the left arm but that he would be able to use it for basic daily function for these few days.  5. He was able to get on medicaid since his last visit and had an echo today. The plan moving forward will be to follow up in 3 months with echo and EKG and to see if his BP improves further with the higher dose of entresto. At that time if his function still looks the same, will rediscuss with him our recommendation of an ICD.  6. His labs  "today show his BNP as being increased significantly, but this is due to his taking Entresto. When he returns in 3 months, will get an NT pro BNP and a BMP.      Interval history:  He reports that he has been taking all of his medications.  He did have what he states to be "bronchitis" for the last two weeks and went to the urgent care and is slowly improving. He feels like he is no longer fluid overloaded and denied having shortness of breath.  No syncope or near-syncope.  No peripheral edema. Usually he notes his swelling in his face and stomach and not his LE, and recently he has not noted any swelling.    Past medical history:      He was recently admitted.  He presented to a local emergency room with chest pain, and his troponin was elevated.  He was transferred to [facility] April 30, 2024.  Pain was right-sided and started a few days prior to admission, was waxing and waning but had been consistently present, and did appear to improve with nitroglycerin in the outside hospital.  Patient also complained of cough for a week but denied any lower extremity edema or other heart failure symptoms.  On admission, echocardiogram was unchanged except his ejection fraction had worsened to about 30%.  He underwent a left heart catheterization that showed normal coronary arteries.  Upon initiation of his prescribed home goal-directed medical therapy, he became hypotensive which was felt to be indicative of poor home medicine compliance.  He had an acute bump in his creatinine associated with this.  Carvedilol was gently restarted with up titration to 6.25 mg twice a day, and Aldactone 25 mg daily was started.  His hydralazine was then added.  He diuresed well in the hospital.    Prior to this, Dr. Corona saw him in clinic on April 8, 2021 after not seeing him for 4 years.  He had been in custodial.  In my clinic, he was significantly hypertensive.  While he was in custodial, it looks like he had significant hypertension.  In " addition to his normal lisinopril and carvedilol, he was placed on hydralazine and amlodipine.  He was also on a statin.  When he got out of jail 2 months prior to that visit with, he went back to only taking his original medications including the carvedilol and the lisinopril.  Had not been taking the amlodipine or the hydralazine.  Despite restarting his hydralazine and his amlodipine, he continued to be quite hypertensive.  Discussed his case with Dr. Arrington, who recommend transitioning to Entresto given the decreased left ventricular function.  Started the Entresto, Purposely did not restart his spironolactone due to concerns about affecting potassium levels.  He was supposed to be on that medication, but he had been noncompliant with it.  No noted side effects from spironolactone.    The review of systems is as noted above. It is otherwise negative for other symptoms related to the general, neurological, psychiatric, endocrine, gastrointestinal, genitourinary, respiratory, dermatologic, musculoskeletal, hematologic, and immunologic systems.    Past Medical History:   Diagnosis Date    Aortic insufficiency     trace    Homograft cardiac valve stenosis     mild    Hypertension     Left ventricular dysfunction     Pulmonary artery hypertension     RV hypertension    Right ventricular dilation     S/P TOF (tetralogy of Fallot) repair 12/18/2014    TOF (tetralogy of Fallot)      Past Surgical History:   Procedure Laterality Date    ANGIOGRAM, CORONARY, WITH LEFT HEART CATHETERIZATION  5/6/2024    Procedure: Angiogram, Coronary, with Left Heart Cath;  Surgeon: nAand Keen MD;  Location: [facility] CATH LAB;  Service: Cardiology;;    balloon dilation of pulmonary atrery homograft  2008    [place] [facility] [place]ONARY ANGIOGRAPHY Right 5/6/2024    Procedure: ANGIOGRAM, CORONARY ARTERY;  Surgeon: Anand Keen MD;  Location: [facility] CATH LAB;  Service: Cardiology;  Laterality: Right;     epicardial pacing wires      LV    pulmonary valve replacement  2006    TETRALOGY OF FALLOT REPAIR       Family History   Problem Relation Name Age of Onset    Hypertension Mother      No Known Problems Father      No Known Problems Sister 2     No Known Problems Brother 1     No Known Problems Maternal Grandmother      No Known Problems Maternal Grandfather      No Known Problems Paternal Grandmother      No Known Problems Paternal Grandfather      No Known Problems Maternal Aunt      No Known Problems Maternal Uncle      No Known Problems Paternal Aunt      No Known Problems Paternal Uncle      Anemia Neg Hx      Arrhythmia Neg Hx      Asthma Neg Hx      Clotting disorder Neg Hx      Fainting Neg Hx      Heart attack Neg Hx      Heart disease Neg Hx      Heart failure Neg Hx      Hyperlipidemia Neg Hx      Stroke Neg Hx      Atrial Septal Defect Neg Hx       Social History     Socioeconomic History    Marital status: Single    Years of education: 10   Tobacco Use    Smoking status: Former     Current packs/day: 0.00     Types: Cigarettes     Start date: 2006     Quit date: 2015     Years since quittin.4    Smokeless tobacco: Never    Tobacco comments:     marijuana   Substance and Sexual Activity    Alcohol use: No     Alcohol/week: 0.0 standard drinks of alcohol    Drug use: Yes     Frequency: 7.0 times per week     Types: Marijuana    Sexual activity: Yes     Partners: Female   Social History Narrative    Lives at home with mother at this time.  He is uninsured and not working.     Social Drivers of Health     Financial Resource Strain: Low Risk  (2024)    Overall Financial Resource Strain (CARDIA)     Difficulty of Paying Living Expenses: Not hard at all   Food Insecurity: No Food Insecurity (2024)    Hunger Vital Sign     Worried About Running Out of Food in the Last Year: Never true     Ran Out of Food in the Last Year: Never true   Transportation Needs: No Transportation  "Needs (5/1/2024)    PRAPARE - Transportation     Lack of Transportation (Medical): No     Lack of Transportation (Non-Medical): No   Physical Activity: Unknown (5/1/2024)    Exercise Vital Sign     Days of Exercise per Week: 7 days     Minutes of Exercise per Session: Patient declined   Stress: No Stress Concern Present (5/1/2024)    [employer] - Occupational Stress Questionnaire     Feeling of Stress : Not at all   Housing Stability: Low Risk  (5/1/2024)    Housing Stability Vital Sign     Unable to Pay for Housing in the Last Year: No     Homeless in the Last Year: No     Current Outpatient Medications on File Prior to Visit   Medication Sig Dispense Refill    aspirin 81 MG Chew Chew and swallow 1 tablet (81 mg total) by mouth once daily. 60 tablet 3    atorvastatin (LIPITOR) 80 MG tablet Take 1 tablet (80 mg total) by mouth every evening. 30 tablet 3    carvediloL (COREG) 6.25 MG tablet Take 1 tablet (6.25 mg total) by mouth 2 (two) times daily. 180 tablet 3    furosemide (LASIX) 80 MG tablet Take 1 tablet (80 mg total) by mouth 2 (two) times daily. 60 tablet 11    hydrALAZINE (APRESOLINE) 25 MG tablet Take 1 tablet (25 mg total) by mouth every 12 (twelve) hours. 60 tablet 11    spironolactone (ALDACTONE) 25 MG tablet Take 1 tablet (25 mg total) by mouth once daily. 30 tablet 3    [DISCONTINUED] sacubitriL-valsartan (ENTRESTO) 24-26 mg per tablet Take 1 tablet by mouth 2 (two) times daily. 60 tablet 3     No current facility-administered medications on file prior to visit.     Review of patient's allergies indicates:  No Known Allergies     Vitals:    12/05/24 0957   BP: (!) 142/99   BP Location: Left arm   Patient Position: Sitting   Pulse: 99   SpO2: 98%   Weight: 91.4 kg (201 lb 8 oz)   Height: 5' 7.13" (1.705 m)     Wt Readings from Last 3 Encounters:   12/05/24 0957 91.4 kg (201 lb 8 oz)   12/05/24 0940 93.9 kg (207 lb)   08/14/24 1312 94.3 kg (207 lb 14.3 oz)     In general, he " is a healthy appearing, nondysmorphic-appearing, black male in no apparent distress. The eyes, nares, and oropharynx are clear.  The neck is supple without thyroid enlargement.  There is very mild jugular venous distention.  The lungs are clear to auscultation bilaterally. There is no wheezing.  There are no rales.  A well-healed median sternotomy incision is noted.  The first heart sound is normal.  A grade 1/6 systolic ejection murmur is best auscultated at the upper left sternal border.  There are no diastolic murmurs or gallops.  The second heart sound is fixed and split.  The abdominal exam is benign without hepatosplenomegaly, tenderness, or distention.  Pulses are normal in all extremities except the right hand where it is mildly decreased.  There is no clubbing, cyanosis, or edema.  No rashes are noted.     I personally reviewed the following tests performed today and my interpretation follows:  EKG in clinic today reveals sinus rhythm with a right bundle branch block.    Results for orders placed during the hospital encounter of 12/05/24    Echo    Interpretation Summary  CONGENITAL CARDIAC HISTORY:  Tetralogy of Fallot s/p repair  23-mm pulmonary homograft in pulmonary position - 7/7/2006.  Left ventricular pacing wires.  Recurrent severe right ventricular hypertension: Multiple balloon dilations of pulmonary homograft (last 2010)  Anne valve 7/2015  S/P Angiography with normal coronary arteries - [facility] (5/6/2024).    Basic Anatomical Connections (previously demonstrated):  Abdominal situs is solitus.  Atrial situs is normal.  Atrioventricular concordance.  Grossly normal tricuspid and mitral valve structure.  RV dilation and RV hypertrophy.  Ventriculoarterial concordance.  Aortic valve is normal and pulmonic valve is abnormal.  Ventricular loop: D-loop.  Cardiac position is levocardia.  Left aortic arch branching.  No ventricular septal defect present.      IMPRESSION:  Technically difficult acoustic  windows.  Qualitative impression of at least moderately dilated right atrium  No obvious signs of previously reported dilation of the tricuspid annulus.  There is trivial to mild tricuspid insufficiency.  Mild right ventricle hypertrophy with at least moderate dilation.  Qualitative impression of moderate to severely reduced systolic function of the inlet and apical segments of the right ventricle with large outflow tract patch.  Inadequate Doppler profile of tricuspid insufficiency to estimate right ventricular systolic pressure.  There is no secondary evidence of elevated RV pressure.  Echodensity consistent with patch repair of ventricular septal defect and malalignment of the ventricular septum with no residual shunt demonstrated.  Images consistent with Anne implant in pulmonary homograft with peak velocity <2.3 m/sec and mean 12 mm Hg.  Trivial to mild pulmonary insufficiency.  2D imaging demonstrates well-developed confluent pulmonary branches.  The left atrial volume index is normal measuring 26 ml/m2.  Trivial mitral valve insufficiency.  Qualitative impression of mild concentric left ventricular hypertrophy.  Dyskinetic septal motion with decreased movement of the LV free wall, SF = 12% and EF qualitatively estimated 25 -30% from off axis apical views (very similar compared to previous study).  Trivial aortic valve insufficiency.  Aortic dimensions:  Sinuses of Valsalva = 39 mm.  ST junction              = 36 mm.  Ascending aorta      = 39 mm.  Normal size aortic arch with no evidence of coarctation.  No pericardial effusion.     Labs today. Mag, CBC, BNP, CMP, BMP. Results are in progress.    Cath coronary angiography 5/6/24:    The coronary arteries were normal..  The pre-procedure left ventricular end diastolic pressure was 20.    Cath 7/23/15:  IMPRESSION:  1. Repaired tetralogy of Fallot with severe homograft stenosis, peak gradient 40 mmHg.  2. Right and left ventricular dysfunction and low cardiac  output, normal pulmonary vascular resistance calculations.  3. Right aortic arch.  4. Right coronary artery from left sinus of Valsalva.  5. RV to PA conduit dilation with Reina 16, 18, 20 and 22 mm balloons.  6. RV to pulmonary artery conduit stent with Palmaz [id number] on 22 x 4 BIB (x3), postdilated with Bruner 22 mm balloon (16 atmospheres).  7. Anne valve implantation on 22-[employer] Ensemble, residual gradient 9 mmHg, trivial insufficiency.    Sincerely,      Maura Harvey PA-C  Pediatric Cardiology  Adult Congenital Heart Disease  [facility]  [address] [place]  [address]  [phone]

## 2024-12-11 RX ORDER — FUROSEMIDE 80 MG/1
80 TABLET ORAL 2 TIMES DAILY
Qty: 60 TABLET | Refills: 11 | Status: CANCELLED | OUTPATIENT
Start: 2024-12-11 | End: 2025-12-11

## 2024-12-12 RX ORDER — SACUBITRIL AND VALSARTAN 49; 51 MG/1; MG/1
1 TABLET, FILM COATED ORAL 2 TIMES DAILY
Qty: 180 TABLET | Refills: 3 | Status: SHIPPED | OUTPATIENT
Start: 2024-12-12

## 2024-12-12 NOTE — TELEPHONE ENCOUNTER
Please see the attached refill request.   Notification Instructions: Patient will be notified of biopsy results. However, patient instructed to call the office if not contacted within 2 weeks.

## 2024-12-17 RX ORDER — SPIRONOLACTONE 25 MG/1
25 TABLET ORAL DAILY
Qty: 30 TABLET | Refills: 6 | Status: SHIPPED | OUTPATIENT
Start: 2024-12-17

## 2024-12-18 ENCOUNTER — PATIENT MESSAGE (OUTPATIENT)
Dept: CARDIOLOGY | Facility: CLINIC | Age: 34
End: 2024-12-18
Payer: MEDICAID

## 2024-12-18 DIAGNOSIS — Z87.74 S/P TOF (TETRALOGY OF FALLOT) REPAIR: Primary | Chronic | ICD-10-CM

## 2024-12-18 DIAGNOSIS — I42.0 DILATED CARDIOMYOPATHY: ICD-10-CM

## 2024-12-18 DIAGNOSIS — I50.20 HFREF (HEART FAILURE WITH REDUCED EJECTION FRACTION): ICD-10-CM

## 2025-03-06 ENCOUNTER — PATIENT MESSAGE (OUTPATIENT)
Dept: CARDIOLOGY | Facility: CLINIC | Age: 35
End: 2025-03-06

## 2025-03-06 ENCOUNTER — HOSPITAL ENCOUNTER (OUTPATIENT)
Dept: CARDIOLOGY | Facility: CLINIC | Age: 35
Discharge: HOME | End: 2025-03-06
Payer: MEDICAID

## 2025-03-06 ENCOUNTER — OFFICE VISIT (OUTPATIENT)
Dept: CARDIOLOGY | Facility: CLINIC | Age: 35
End: 2025-03-06
Payer: MEDICAID

## 2025-03-06 ENCOUNTER — HOSPITAL ENCOUNTER (OUTPATIENT)
Dept: CARDIOLOGY | Facility: HOSPITAL | Age: 35
Discharge: HOME | End: 2025-03-06
Payer: MEDICAID

## 2025-03-06 VITALS
WEIGHT: 201 LBS | OXYGEN SATURATION: 98 % | SYSTOLIC BLOOD PRESSURE: 128 MMHG | WEIGHT: 212.31 LBS | BODY MASS INDEX: 33.32 KG/M2 | DIASTOLIC BLOOD PRESSURE: 86 MMHG | BODY MASS INDEX: 31.55 KG/M2 | HEIGHT: 67 IN | HEIGHT: 67 IN | HEART RATE: 74 BPM

## 2025-03-06 DIAGNOSIS — Z87.74 S/P TOF (TETRALOGY OF FALLOT) REPAIR: Chronic | ICD-10-CM

## 2025-03-06 DIAGNOSIS — I50.20 HFREF (HEART FAILURE WITH REDUCED EJECTION FRACTION): ICD-10-CM

## 2025-03-06 DIAGNOSIS — Q24.9 ADULT CONGENITAL HEART DISEASE: Primary | ICD-10-CM

## 2025-03-06 DIAGNOSIS — Z95.4 S/P TRANSCATHETER REPLACEMENT OF PULMONARY VALVE: ICD-10-CM

## 2025-03-06 DIAGNOSIS — Z87.74 S/P TOF (TETRALOGY OF FALLOT) REPAIR: ICD-10-CM

## 2025-03-06 DIAGNOSIS — I42.0 DILATED CARDIOMYOPATHY: ICD-10-CM

## 2025-03-06 DIAGNOSIS — I10 ESSENTIAL HYPERTENSION: ICD-10-CM

## 2025-03-06 LAB
ASCENDING AORTA: 3.19 CM
AV AREA BY CONTINUOUS VTI: 4 CM2
AV INDEX (PROSTH): 0.69
AV LVOT MEAN GRADIENT: 0 MMHG
AV LVOT PEAK GRADIENT: 1 MMHG
AV MEAN GRADIENT: 1 MMHG
AV PEAK GRADIENT: 2 MMHG
AV VALVE AREA BY VELOCITY RATIO: 3.3 CM²
AV VALVE AREA: 3.9 CM2
AV VELOCITY RATIO: 0.57
BSA FOR ECHO PROCEDURE: 2.08 M2
CV ECHO LV RWT: 0.45 CM
DOP CALC AO PEAK VEL: 0.7 M/S
DOP CALC AO VTI: 13.7 CM
DOP CALC LVOT AREA: 5.7 CM2
DOP CALC LVOT DIAMETER: 2.7 CM
DOP CALC LVOT PEAK VEL: 0.4 M/S
DOP CALC LVOT STROKE VOLUME: 53.8 CM3
DOP CALC RVOT VTI: 9.71 CM
DOP CALCLVOT PEAK VEL VTI: 9.4 CM
E WAVE DECELERATION TIME: 91 MS
E/A RATIO: 2.35
E/E' RATIO: 14 M/S
ECHO EF ESTIMATED: 45 %
ECHO LV POSTERIOR WALL: 1.3 CM (ref 0.6–1.1)
FRACTIONAL SHORTENING: 24.1 % (ref 28–44)
GLOBAL LONGITUIDAL STRAIN: 5 %
INTERVENTRICULAR SEPTUM: 1.1 CM (ref 0.6–1.1)
LA MAJOR: 5.3 CM
LA WIDTH: 3.7 CM
LEFT ATRIUM SIZE: 3.4 CM
LEFT INTERNAL DIMENSION IN SYSTOLE: 4.4 CM (ref 2.1–4)
LEFT VENTRICLE DIASTOLIC VOLUME INDEX: 78.37 ML/M2
LEFT VENTRICLE DIASTOLIC VOLUME: 163 ML
LEFT VENTRICLE MASS INDEX: 142.8 G/M2
LEFT VENTRICLE SYSTOLIC VOLUME INDEX: 42.8 ML/M2
LEFT VENTRICLE SYSTOLIC VOLUME: 89 ML
LEFT VENTRICULAR INTERNAL DIMENSION IN DIASTOLE: 5.8 CM (ref 3.5–6)
LEFT VENTRICULAR MASS: 297 G
LV LATERAL E/E' RATIO: 13.5
LV SEPTAL E/E' RATIO: 13.5
MV PEAK A VEL: 0.23 M/S
MV PEAK E VEL: 0.54 M/S
OHS CV RV/LV RATIO: 0.78 CM
PISA TR MAX VEL: 2.6 M/S
PV MEAN GRADIENT: 6 MMHG
PV MV: 1.18 M/S
PV PEAK GRADIENT: 10 MMHG
PV PEAK VELOCITY: 1.55 M/S
RA MAJOR: 6.45 CM
RA WIDTH: 3.68 CM
RIGHT ATRIAL AREA: 20 CM2
RIGHT VENTRICLE DIASTOLIC BASEL DIMENSION: 4.5 CM
RV TISSUE DOPPLER FREE WALL SYSTOLIC VELOCITY 1 (APICAL 4 CHAMBER VIEW): 7.06 CM/S
SINUS: 3.79 CM
STJ: 3.08 CM
TDI LATERAL: 0.04 M/S
TDI SEPTAL: 0.04 M/S
TDI: 0.04 M/S
TRICUSPID ANNULAR PLANE SYSTOLIC EXCURSION: 1.35 CM
TV PEAK GRADIENT: 27 MMHG
Z-SCORE OF LEFT VENTRICULAR DIMENSION IN END DIASTOLE: -0.94
Z-SCORE OF LEFT VENTRICULAR DIMENSION IN END SYSTOLE: 0.96

## 2025-03-06 PROCEDURE — 93356 MYOCRD STRAIN IMG SPCKL TRCK: CPT | Mod: ,,, | Performed by: PEDIATRICS

## 2025-03-06 PROCEDURE — 93320 DOPPLER ECHO COMPLETE: CPT

## 2025-03-06 PROCEDURE — 93320 DOPPLER ECHO COMPLETE: CPT | Mod: 26,,, | Performed by: PEDIATRICS

## 2025-03-06 PROCEDURE — 93325 DOPPLER ECHO COLOR FLOW MAPG: CPT | Mod: 26,,, | Performed by: PEDIATRICS

## 2025-03-06 PROCEDURE — 99212 OFFICE O/P EST SF 10 MIN: CPT | Mod: PBBFAC,25

## 2025-03-06 PROCEDURE — 99999 PR PBB SHADOW E&M-EST. PATIENT-LVL II: CPT | Mod: PBBFAC,,,

## 2025-03-06 PROCEDURE — 93303 ECHO TRANSTHORACIC: CPT | Mod: 26,,, | Performed by: PEDIATRICS

## 2025-03-06 PROCEDURE — 93005 ELECTROCARDIOGRAM TRACING: CPT | Mod: PBBFAC | Performed by: INTERNAL MEDICINE

## 2025-03-06 RX ORDER — SACUBITRIL AND VALSARTAN 97; 103 MG/1; MG/1
1 TABLET, FILM COATED ORAL 2 TIMES DAILY
Qty: 180 TABLET | Refills: 3 | Status: SHIPPED | OUTPATIENT
Start: 2025-03-06

## 2025-03-06 NOTE — PROGRESS NOTES
2025    Re:Thomas Horvath  :1990     Sissy Lacy MD  [address]  [facility] [place] [address]    [facility] Note    Thomas Horvath is a 34 y.o. male with the following diagnoses:  1. 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   - epicardial left ventricular pacing wires in place.   3. 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 Mild pulmonary artery hypertension noted in the past.   5. Trace aortic insufficiency and mild aortic root enlargement  6. Long history of decreased LV function - possibly exacerbated by high afterload.   7. Hypertension - poor compliance in the past with medications, improving  Strong family history of hypertension.   - no significant renal artery stenosis on US 24  8. Recent admission May 2024 with chest pain, worsened EF, elevated troponin complicated by ARF due to hypotension  - coronary angiography 24 normal       Recommendations:  1.  Continue current medications.  Increasing Entresto today from 49/51 to 97/103 mg.  2.  checked labs today. His potassium and creatinine look great (3.8 and 1.4). Waiting on results from NT Pro labs done today.  3.  Follow-up 3 months with echo, EKG, NT pro BNP and BMP  4. Also follow up with Dr. Diallo in 3 months to discuss getting an ICD  5. SBE prophylaxis is definitely indicated.  Regular dental care strongly recommended. (Only recalls having his teeth cleaned 1 or 2 times in his lifetime.)  6. Any worsening shortness of breath, significant palpitations, syncope, chest pain, neurologic changes, or worsening headache or other concerning symptoms should prompt emergency room visit.  7. Low salt diet. Start exercising more (he enjoys walking)    Discussion:    1.  From a congenital  heart standpoint he looks great.  The pulmonary valve is working well and looks great on today's echo.  2.  That said, he had significant worsening of his left ventricular function noted in the hospital when he was last admitted in May.  He was fluid overloaded. We were hoping to see some improvement in the function at this point as it's been a year since discharge. However, his function appear unchanged on today's echo and hasn't improved significantly. EF was also 30-35% today.   3. In the past, there have been concerns about how compliant he is with his medications at home given his profound hypotension when he took what was supposed to be his home dose of medication while in the hospital. Today he reports that he has taken his medication regularly, only occasionally missing a day or two. His blood pressure looked great today and is much improved at 128/86, which is the lowest we recall seeing it. He said he has a much stronger understanding now of the importance of being compliant with his medication. increasing his Entresto dose again today to the maximum dose, which could also hopefully help his function.  4. Dr. Corona strongly recommends an ICD given TOF, RBBB, poor LV function. We definitely think he is at increased risk of sudden death and we need to reassess as his ventricular function does not seem to be improving.  We've had long talks about this today and in the past and strongly recommended that he consider having an ICD placed. He is giving it serious consideration and said he feels ready, but going to give him 3 more months to see if he has any other questions to ask of Dr. Diallo when he meets him next.   5. He had another echo today with his left ventricular function looking essentially the same. The plan moving forward will be to follow up in 3 months with echo and EKG and to see if his BP improves further with the higher dose of entresto (his BP improved today and is now at 128/86, which is much  better - but would like to see it lower. Entresto being increased to the highest dose or 97/103). He also seems to be ready at this point to have an ICD - so we will ask Dr. Diallo to meet with him also in 3 months to answer any questions.  6. His labs in the past showed his BNP as being increased significantly, but that was likely due to his taking Entresto. Waiting today to see the results of his NT pro BNP. When he returns in 3 months, will again get an NT pro BNP and a BMP.  7. Spent a good amount of time on patient education about the importance of regular dental cleaning. He only recalls doing this once or twice in his life but says that he will schedule soon. Also explained about the need for antibiotics. He has an ingrown toenail that looks like it has the potential to get infected so discussed this as well.      Interval history:  He looks great today and has done a good job at taking his medication. He also is really taking on the onus for his healthcare. He does want to exercise more. He denies all cardiac symptoms, no chest pain, palpitations, SOB, syncope, near syncope, swelling.)    Past medical history:      He was admitted last year after presenting to a local emergency room with chest pain, and his troponin was elevated.  He was transferred to [facility] April 30, 2024.  Pain was right-sided and started a few days prior to admission, was waxing and waning but had been consistently present, and did appear to improve with nitroglycerin in the outside hospital.  Patient also complained of cough for a week but denied any lower extremity edema or other heart failure symptoms.  On admission, echocardiogram was unchanged except his ejection fraction had worsened to about 30%.  He underwent a left heart catheterization that showed normal coronary arteries.  Upon initiation of his prescribed home goal-directed medical therapy, he became hypotensive which was felt to be indicative of poor home medicine compliance.   He had an acute bump in his creatinine associated with this.  Carvedilol was gently restarted with up titration to 6.25 mg twice a day, and Aldactone 25 mg daily was started.  His hydralazine was then added.  He diuresed well in the hospital.    Prior to this, Dr. Corona saw him in clinic on April 8, 2021 after not seeing him for 4 years.  He had been in alf.  In my clinic, he was significantly hypertensive.  While he was in alf, it looks like he had significant hypertension.  In addition to his normal lisinopril and carvedilol, he was placed on hydralazine and amlodipine.  He was also on a statin.  When he got out of alf 2 months prior to that visit with, he went back to only taking his original medications including the carvedilol and the lisinopril.  Had not been taking the amlodipine or the hydralazine.  Despite restarting his hydralazine and his amlodipine, he continued to be quite hypertensive.  Discussed his case with Dr. Arrington, who recommend transitioning to Entresto given the decreased left ventricular function.  Started the Entresto, Purposely did not restart his spironolactone due to concerns about affecting potassium levels.  He was supposed to be on that medication, but he had been noncompliant with it.  No noted side effects from spironolactone.    The review of systems is as noted above. It is otherwise negative for other symptoms related to the general, neurological, psychiatric, endocrine, gastrointestinal, genitourinary, respiratory, dermatologic, musculoskeletal, hematologic, and immunologic systems.    Past Medical History:   Diagnosis Date    Aortic insufficiency     trace    Homograft cardiac valve stenosis     mild    Hypertension     Left ventricular dysfunction     Pulmonary artery hypertension     RV hypertension    Right ventricular dilation     S/P TOF (tetralogy of Fallot) repair 12/18/2014    TOF (tetralogy of Fallot)      Past Surgical History:   Procedure Laterality Date     ANGIOGRAM, CORONARY, WITH LEFT HEART CATHETERIZATION  2024    Procedure: Angiogram, Coronary, with Left Heart Cath;  Surgeon: Anand Keen MD;  Location: [facility] CATH LAB;  Service: Cardiology;;    balloon dilation of pulmonary atrery homograft      CARDIAC VALVE SURGERY      7/23/15 @ [facility] [place]    CORONARY ANGIOGRAPHY Right 2024    Procedure: ANGIOGRAM, CORONARY ARTERY;  Surgeon: Anand Keen MD;  Location: [facility] CATH LAB;  Service: Cardiology;  Laterality: Right;    epicardial pacing wires      LV    pulmonary valve replacement  2006    TETRALOGY OF FALLOT REPAIR       Family History   Problem Relation Name Age of Onset    Hypertension Mother      No Known Problems Father      No Known Problems Sister 2     No Known Problems Brother 1     No Known Problems Maternal Grandmother      No Known Problems Maternal Grandfather      No Known Problems Paternal Grandmother      No Known Problems Paternal Grandfather      No Known Problems Maternal Aunt      No Known Problems Maternal Uncle      No Known Problems Paternal Aunt      No Known Problems Paternal Uncle      Anemia Neg Hx      Arrhythmia Neg Hx      Asthma Neg Hx      Clotting disorder Neg Hx      Fainting Neg Hx      Heart attack Neg Hx      Heart disease Neg Hx      Heart failure Neg Hx      Hyperlipidemia Neg Hx      Stroke Neg Hx      Atrial Septal Defect Neg Hx       Social History     Socioeconomic History    Marital status: Single    Years of education: 10   Tobacco Use    Smoking status: Former     Current packs/day: 0.00     Types: Cigarettes     Start date: 2006     Quit date: 2015     Years since quittin.7    Smokeless tobacco: Never    Tobacco comments:     marijuana   Substance and Sexual Activity    Alcohol use: No     Alcohol/week: 0.0 standard drinks of alcohol    Drug use: Yes     Frequency: 7.0 times per week     Types: Marijuana    Sexual activity: Yes     Partners: Female   Social History Narrative     Lives at home with mother at this time.  He is uninsured and not working.     Social Drivers of Health     Financial Resource Strain: Low Risk  (5/1/2024)    Overall Financial Resource Strain (CARDIA)     Difficulty of Paying Living Expenses: Not hard at all   Food Insecurity: No Food Insecurity (5/1/2024)    Hunger Vital Sign     Worried About Running Out of Food in the Last Year: Never true     Ran Out of Food in the Last Year: Never true   Transportation Needs: No Transportation Needs (5/1/2024)    PRAPARE - Transportation     Lack of Transportation (Medical): No     Lack of Transportation (Non-Medical): No   Physical Activity: Unknown (5/1/2024)    Exercise Vital Sign     Days of Exercise per Week: 7 days     Minutes of Exercise per Session: Patient declined   Stress: No Stress Concern Present (5/1/2024)    [employer] - Occupational Stress Questionnaire     Feeling of Stress : Not at all   Housing Stability: Unknown (5/1/2024)    Housing Stability Vital Sign     Unable to Pay for Housing in the Last Year: No     Homeless in the Last Year: No     Current Outpatient Medications on File Prior to Visit   Medication Sig Dispense Refill    aspirin 81 MG Chew Chew and swallow 1 tablet (81 mg total) by mouth once daily. 60 tablet 3    atorvastatin (LIPITOR) 80 MG tablet Take 1 tablet (80 mg total) by mouth every evening. 30 tablet 3    carvediloL (COREG) 6.25 MG tablet Take 1 tablet (6.25 mg total) by mouth 2 (two) times daily. 180 tablet 3    furosemide (LASIX) 80 MG tablet Take 1 tablet (80 mg total) by mouth 2 (two) times daily. 60 tablet 11    hydrALAZINE (APRESOLINE) 25 MG tablet Take 1 tablet (25 mg total) by mouth every 12 (twelve) hours. 60 tablet 11    sacubitriL-valsartan (ENTRESTO) 49-51 mg per tablet Take 1 tablet by mouth 2 (two) times daily. 180 tablet 3    spironolactone (ALDACTONE) 25 MG tablet Take 1 tablet (25 mg total) by mouth once daily. 30 tablet 6     No current  "facility-administered medications on file prior to visit.     Review of patient's allergies indicates:  No Known Allergies     Vitals:    03/06/25 1258   BP: 128/86   BP Location: Left arm   Patient Position: Sitting   Pulse: 74   SpO2: 98%   Weight: 96.3 kg (212 lb 4.9 oz)   Height: 5' 7.13" (1.705 m)     Wt Readings from Last 3 Encounters:   03/06/25 1258 96.3 kg (212 lb 4.9 oz)   03/06/25 1222 91.2 kg (201 lb)   12/05/24 0940 93.9 kg (207 lb)     In general, he is a healthy appearing, nondysmorphic-appearing, black male in no apparent distress. The eyes, nares, and oropharynx are clear.  The neck is supple without thyroid enlargement.  There is very mild jugular venous distention.  The lungs are clear to auscultation bilaterally. There is no wheezing.  There are no rales.  A well-healed median sternotomy incision is noted.  The first heart sound is normal.  A grade 1/6 systolic ejection murmur is best auscultated at the upper left sternal border.  There are no diastolic murmurs or gallops.  The second heart sound is fixed and split.  The abdominal exam is benign without hepatosplenomegaly, tenderness, or distention.  Pulses are normal in all extremities except the right hand where it is mildly decreased.  There is no clubbing, cyanosis, or edema.  No rashes are noted.     I personally reviewed the following tests performed today and my interpretation follows:  EKG in clinic today reveals sinus rhythm with a right bundle branch block.    Results for orders placed during the hospital encounter of 03/06/25    Echo    Interpretation Summary  CONGENITAL CARDIAC HISTORY:  Tetralogy of Fallot s/p repair  23-mm pulmonary homograft in pulmonary position - 7/7/2006.  Left ventricular pacing wires.  Recurrent severe right ventricular hypertension: Multiple balloon dilations of pulmonary homograft (last 2010)  Anne valve 7/2015  S/P Angiography with normal coronary arteries - [facility] (5/6/2024).    SEGMENTAL CARDIAC " CONNECTIONS: (previously demonstrated):  Abdominal situs is solitus.  Atrial situs is normal.  Atrioventricular concordance.  Grossly normal tricuspid and mitral valve structure.  RV dilation and RV hypertrophy.  Ventriculoarterial concordance.  Aortic valve is normal and pulmonic valve is abnormal.  Ventricular loop: D-loop.  Cardiac position is levocardia.  Left aortic arch branching.  No ventricular septal defect present.    IMPRESSION:  Technically difficult acoustic windows with overall impression of no significant change compared to previous..  Qualitative impression of at least moderately dilated right atrium  There is trivial to mild tricuspid insufficiency.  Mild right ventricle hypertrophy with at least moderate dilation.  Qualitative impression of moderate to severely reduced systolic function of the inlet and apical segments of the right ventricle with large outflow tract patch.  Right ventricular pressure estimated >28 mmHg above right atrial pressure from reasonably well defined TR doppler profile.  Echodensity consistent with patch repair of ventricular septal defect and malalignment of the ventricular septum with no residual shunt demonstrated.  Images consistent with Anne implant in pulmonary homograft with peak velocity <1.9 m/sec and mean <10 mm Hg.  Trivial pulmonary insufficiency.  2D imaging demonstrates well-developed confluent pulmonary branches.  The left atrial volume index not calculated (qualitatively normal)  Trivial mitral valve insufficiency.  Qualitative impression of mild concentric left ventricular hypertrophy.  Dyskinetic septal motion with decreased movement of the LV free wall, SF = 13% and EF qualitatively estimated 30 -35% from off axis apical views (very similar compared to previous study).  Global left ventricular longitudinal strain abnormal - peak average measured -3 to -5%.  Structure of the aortic valve is not well demonstrated.  Maximum velocity across the aortic valve  measured 0.8 m/sec with VTI <14 cm (normal 18 -25).  Trivial aortic valve insufficiency.  Aortic dimensions:  Sinuses of Valsalva = 38 mm.  ST junction              = 31 mm.  Ascending aorta      = 32 mm.  Normal size aortic arch with no evidence of coarctation.  No pericardial effusion.     BMP  Lab Results   Component Value Date     03/06/2025    K 3.8 03/06/2025    CL 99 03/06/2025    CO2 31 (H) 03/06/2025    BUN 15 03/06/2025    CREATININE 1.4 03/06/2025    CALCIUM 9.6 03/06/2025    ANIONGAP 10 03/06/2025    EGFRNORACEVR >60.0 03/06/2025      Cath coronary angiography 5/6/24:    The coronary arteries were normal..  The pre-procedure left ventricular end diastolic pressure was 20.    Cath 7/23/15:  IMPRESSION:  1. Repaired tetralogy of Fallot with severe homograft stenosis, peak gradient 40 mmHg.  2. Right and left ventricular dysfunction and low cardiac output, normal pulmonary vascular resistance calculations.  3. Right aortic arch.  4. Right coronary artery from left sinus of Valsalva.  5. RV to PA conduit dilation with [place] 16, 18, 20 and 22 mm balloons.  6. RV to pulmonary artery conduit stent with [place]ma[place] [id number] on 22 x 4 BIB (x3), postdilated with [place] 22 mm balloon (16 atmospheres).  7. Anne valve implantation on 22-[place] Ensemble, residual gradient 9 mmHg, trivial insufficiency.    Sincerely,      Maura Harvey PA-C  Pediatric Cardiology  Adult Congenital Heart Disease  [facility]  [address] [place]  [address]  [phone]

## 2025-03-07 LAB
OHS QRS DURATION: 182 MS
OHS QTC CALCULATION: 531 MS

## 2025-03-10 ENCOUNTER — RESULTS FOLLOW-UP (OUTPATIENT)
Dept: CARDIOLOGY | Facility: HOSPITAL | Age: 35
End: 2025-03-10

## 2025-04-08 ENCOUNTER — PATIENT MESSAGE (OUTPATIENT)
Dept: CARDIOLOGY | Facility: CLINIC | Age: 35
End: 2025-04-08
Payer: MEDICAID

## 2025-04-08 RX ORDER — CARVEDILOL 6.25 MG/1
6.25 TABLET ORAL 2 TIMES DAILY
Qty: 180 TABLET | Refills: 3 | Status: SHIPPED | OUTPATIENT
Start: 2025-04-08 | End: 2026-04-08

## 2025-04-09 RX ORDER — ATORVASTATIN CALCIUM 80 MG/1
80 TABLET, FILM COATED ORAL NIGHTLY
Qty: 30 TABLET | Refills: 3 | Status: SHIPPED | OUTPATIENT
Start: 2025-04-09 | End: 2026-04-09

## 2025-04-15 ENCOUNTER — PATIENT MESSAGE (OUTPATIENT)
Dept: CARDIOLOGY | Facility: CLINIC | Age: 35
End: 2025-04-15
Payer: MEDICAID

## 2025-04-15 DIAGNOSIS — I37.0 PULMONARY VALVE STENOSIS, UNSPECIFIED ETIOLOGY: ICD-10-CM

## 2025-04-15 DIAGNOSIS — I51.7 RIGHT VENTRICULAR DILATION: ICD-10-CM

## 2025-04-15 DIAGNOSIS — Z87.74 S/P TOF (TETRALOGY OF FALLOT) REPAIR: Primary | Chronic | ICD-10-CM

## 2025-04-15 DIAGNOSIS — Z98.890 S/P RIGHT VENTRICLE TO PULMONARY ARTERY (RV-PA) CONDUIT: ICD-10-CM

## 2025-04-15 DIAGNOSIS — I42.0 DILATED CARDIOMYOPATHY: ICD-10-CM

## 2025-04-15 DIAGNOSIS — Q24.9 ADULT CONGENITAL HEART DISEASE: ICD-10-CM

## 2025-04-15 DIAGNOSIS — Z95.4 S/P TRANSCATHETER REPLACEMENT OF PULMONARY VALVE: ICD-10-CM

## (undated) DEVICE — CATH DXTERITY JR40 100CM 6FR

## (undated) DEVICE — OMNIPAQUE 350 200ML

## (undated) DEVICE — SPIKE SHORT LG BORE 1-WAY 2IN

## (undated) DEVICE — TRAY CATH LAB OMC

## (undated) DEVICE — GUIDEWIRE SUPRA CORE 035 190CM

## (undated) DEVICE — KIT GLIDESHEATH SLEND 6FR 10CM

## (undated) DEVICE — PAD DEFIB CADENCE ADULT R2

## (undated) DEVICE — CATH IMA INFINITI 4FRX100CM

## (undated) DEVICE — TUBE CONTRAST SQUEEZE

## (undated) DEVICE — CATH DXTERITY JL40 100CM 6FR

## (undated) DEVICE — GUIDEWIRE STF .035X180CM ANG

## (undated) DEVICE — HEMOSTAT VASC BAND REG 24CM

## (undated) DEVICE — DRAPE ANGIO BRACH 38X44IN

## (undated) DEVICE — TRANSDUCER ADULT DISP

## (undated) DEVICE — TOWEL OR DISP STRL BLUE 4/PK

## (undated) DEVICE — KIT CUSTOM MANIFOLD

## (undated) DEVICE — CATH JACKY RADIAL 5FR 100CM